# Patient Record
Sex: MALE | Race: WHITE | Employment: OTHER | URBAN - METROPOLITAN AREA
[De-identification: names, ages, dates, MRNs, and addresses within clinical notes are randomized per-mention and may not be internally consistent; named-entity substitution may affect disease eponyms.]

---

## 2017-01-18 RX ORDER — LATANOPROST 50 UG/ML
SOLUTION/ DROPS OPHTHALMIC
Qty: 2.5 ML | Refills: 8 | Status: SHIPPED | OUTPATIENT
Start: 2017-01-18 | End: 2018-05-17 | Stop reason: ALTCHOICE

## 2017-02-17 ENCOUNTER — APPOINTMENT (OUTPATIENT)
Dept: GENERAL RADIOLOGY | Age: 52
End: 2017-02-17
Attending: EMERGENCY MEDICINE
Payer: MEDICAID

## 2017-02-17 ENCOUNTER — TELEPHONE (OUTPATIENT)
Dept: INTERNAL MEDICINE CLINIC | Facility: CLINIC | Age: 52
End: 2017-02-17

## 2017-02-17 ENCOUNTER — HOSPITAL ENCOUNTER (OUTPATIENT)
Age: 52
Discharge: HOME | End: 2017-02-17
Payer: MEDICAID

## 2017-02-17 VITALS
RESPIRATION RATE: 18 BRPM | OXYGEN SATURATION: 96 % | DIASTOLIC BLOOD PRESSURE: 77 MMHG | HEART RATE: 107 BPM | TEMPERATURE: 99 F | SYSTOLIC BLOOD PRESSURE: 147 MMHG

## 2017-02-17 DIAGNOSIS — R68.89 INFLUENZA-LIKE SYMPTOMS: Primary | ICD-10-CM

## 2017-02-17 PROCEDURE — 71020 XR CHEST PA + LAT CHEST (CPT=71020): CPT

## 2017-02-17 PROCEDURE — 99213 OFFICE O/P EST LOW 20 MIN: CPT

## 2017-02-17 PROCEDURE — 99204 OFFICE O/P NEW MOD 45 MIN: CPT

## 2017-02-17 RX ORDER — OSELTAMIVIR PHOSPHATE 75 MG/1
75 CAPSULE ORAL 2 TIMES DAILY
Qty: 10 CAPSULE | Refills: 0 | Status: SHIPPED | OUTPATIENT
Start: 2017-02-17 | End: 2017-02-22

## 2017-02-17 NOTE — TELEPHONE ENCOUNTER
Pt calling having a really bad cough, fever, head ache, dizziness, and dirrhea. .. please advise for all doctors are booked. .. please advise

## 2017-02-17 NOTE — ED PROVIDER NOTES
Patient Seen in: [facility] Immediate Care In [address] [place] [address]    History   Patient presents with:  Cough/URI    Stated Complaint: cold symptom, fever    HPI    Patient is a 58-year-old male with no past medical history he complains of day #2 of fever cou in HPI. Constitutional and vital signs reviewed. All other systems reviewed and negative except as noted above. PSFH elements reviewed from today and agreed except as otherwise stated in HPI.     Physical Exam       ED Triage Vitals   BP 02/17/17 1 air      Disposition and Plan     Clinical Impression:  Influenza-like symptoms  (primary encounter diagnosis)    Disposition:  Discharge    Follow-up:  Christina Boast, MD  [address] [facility] [address]  [facility] [address] [phone]            Medications

## 2017-02-17 NOTE — ED INITIAL ASSESSMENT (HPI)
General aches and pains with cough and fever could not get out of bed not eating headaches. Took mucinex and otc meds w/o relief.

## 2017-02-17 NOTE — TELEPHONE ENCOUNTER
Reason for Call/Chief Complaint: cough, fever  Onset: 4 days  Nursing Assessment/Associated Symptoms:  dry cough. Fever and body aches yesterday and today. Patient has not taken his temperature. Sore throat, painful to swallow. Chest hurts when coughing.  Leonard Moreau

## 2017-02-18 NOTE — TELEPHONE ENCOUNTER
Unable to get a hold of patient -  states unable to leave message at this time.     Patient was seen [facility] 2/17/17 - was given a chest x-ray: normal examination, Tamiflu, and was told to follow up with [facility]    No response letter also sent to patient

## 2017-04-25 ENCOUNTER — OFFICE VISIT (OUTPATIENT)
Dept: INTERNAL MEDICINE CLINIC | Facility: CLINIC | Age: 52
End: 2017-04-25

## 2017-04-25 ENCOUNTER — LAB ENCOUNTER (OUTPATIENT)
Dept: LAB | Age: 52
End: 2017-04-25
Attending: INTERNAL MEDICINE
Payer: MEDICAID

## 2017-04-25 VITALS
HEART RATE: 71 BPM | HEIGHT: 70 IN | BODY MASS INDEX: 26.77 KG/M2 | TEMPERATURE: 98 F | SYSTOLIC BLOOD PRESSURE: 134 MMHG | DIASTOLIC BLOOD PRESSURE: 90 MMHG | WEIGHT: 187 LBS

## 2017-04-25 DIAGNOSIS — R10.9 ABDOMINAL PAIN, UNSPECIFIED LOCATION: ICD-10-CM

## 2017-04-25 DIAGNOSIS — M94.0 COSTOCHONDRITIS: ICD-10-CM

## 2017-04-25 DIAGNOSIS — M18.12 OSTEOARTHRITIS OF CARPOMETACARPAL (CMC) JOINT OF LEFT THUMB, UNSPECIFIED OSTEOARTHRITIS TYPE: Primary | ICD-10-CM

## 2017-04-25 PROCEDURE — 85025 COMPLETE CBC W/AUTO DIFF WBC: CPT

## 2017-04-25 PROCEDURE — 83690 ASSAY OF LIPASE: CPT

## 2017-04-25 PROCEDURE — 80053 COMPREHEN METABOLIC PANEL: CPT

## 2017-04-25 PROCEDURE — 36415 COLL VENOUS BLD VENIPUNCTURE: CPT

## 2017-04-25 PROCEDURE — 99212 OFFICE O/P EST SF 10 MIN: CPT | Performed by: INTERNAL MEDICINE

## 2017-04-25 PROCEDURE — 99213 OFFICE O/P EST LOW 20 MIN: CPT | Performed by: INTERNAL MEDICINE

## 2017-04-25 RX ORDER — NAPROXEN 500 MG/1
500 TABLET ORAL 2 TIMES DAILY WITH MEALS
Qty: 60 TABLET | Refills: 3 | Status: SHIPPED | OUTPATIENT
Start: 2017-04-25 | End: 2018-04-20

## 2017-04-25 NOTE — PROGRESS NOTES
Has 3 issues  1) trigger finger l thumb  2) a vauge pain left sided when he breathes very deeply  3) occ loose bms  Has never had a colonoscopy    Blood pressure 134/90, pulse 71, temperature 98.1 °F (36.7 °C), temperature source Oral, height 5' 10\" (1.77

## 2017-05-17 ENCOUNTER — TELEPHONE (OUTPATIENT)
Dept: INTERNAL MEDICINE CLINIC | Facility: CLINIC | Age: 52
End: 2017-05-17

## 2017-05-17 NOTE — TELEPHONE ENCOUNTER
Pt would like a refill on his lovastatin medication. Per pt he is out of medication. Pharmacy: [facility] (listed)     Current Outpatient Prescriptions:  Lovastatin 20 MG Oral Tab Take 1 tablet (20 mg total) by mouth nightly.  Disp: 90 tablet Rfl: 3

## 2017-05-17 NOTE — TELEPHONE ENCOUNTER
Cholesterol Medications; PROTOCOL FAILED. PLEASE ADVISE ON REFILL. THANKS.   Protocol Criteria:  · Appointment scheduled in the past 12 months or in the next 3 months  · ALT & LDL on file in the past 12 months  · ALT result < 80  · LDL result <130   Recent

## 2017-05-18 RX ORDER — LOVASTATIN 20 MG/1
TABLET ORAL
Qty: 90 TABLET | Refills: 3 | Status: SHIPPED | OUTPATIENT
Start: 2017-05-18 | End: 2018-05-17 | Stop reason: ALTCHOICE

## 2017-07-31 ENCOUNTER — HOSPITAL ENCOUNTER (OUTPATIENT)
Age: 52
Discharge: HOME | End: 2017-07-31
Attending: PEDIATRICS
Payer: MEDICAID

## 2017-07-31 VITALS
BODY MASS INDEX: 25.77 KG/M2 | HEART RATE: 180 BPM | WEIGHT: 180 LBS | OXYGEN SATURATION: 97 % | RESPIRATION RATE: 16 BRPM | DIASTOLIC BLOOD PRESSURE: 90 MMHG | HEIGHT: 70 IN | TEMPERATURE: 99 F | SYSTOLIC BLOOD PRESSURE: 134 MMHG

## 2017-07-31 DIAGNOSIS — S05.01XA CORNEAL ABRASION, RIGHT, INITIAL ENCOUNTER: ICD-10-CM

## 2017-07-31 DIAGNOSIS — H11.31 SUBCONJUNCTIVAL HEMORRHAGE OF RIGHT EYE: Primary | ICD-10-CM

## 2017-07-31 PROCEDURE — 99213 OFFICE O/P EST LOW 20 MIN: CPT

## 2017-07-31 PROCEDURE — 99214 OFFICE O/P EST MOD 30 MIN: CPT

## 2017-07-31 RX ORDER — PURIFIED WATER 986 MG/ML
2 SOLUTION OPHTHALMIC ONCE
Status: COMPLETED | OUTPATIENT
Start: 2017-07-31 | End: 2017-07-31

## 2017-07-31 RX ORDER — TETRACAINE HYDROCHLORIDE 5 MG/ML
1 SOLUTION OPHTHALMIC ONCE
Status: COMPLETED | OUTPATIENT
Start: 2017-07-31 | End: 2017-07-31
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
## 2018-05-04 ENCOUNTER — HOSPITAL ENCOUNTER (EMERGENCY)
Facility: HOSPITAL | Age: 53
Discharge: HOME | End: 2018-05-04
Payer: MEDICAID

## 2018-05-04 ENCOUNTER — APPOINTMENT (OUTPATIENT)
Dept: CT IMAGING | Facility: HOSPITAL | Age: 53
End: 2018-05-04
Attending: NURSE PRACTITIONER
Payer: MEDICAID

## 2018-05-04 ENCOUNTER — NURSE TRIAGE (OUTPATIENT)
Dept: OTHER | Age: 53
End: 2018-05-04

## 2018-05-04 VITALS
HEIGHT: 70 IN | TEMPERATURE: 98 F | OXYGEN SATURATION: 97 % | SYSTOLIC BLOOD PRESSURE: 154 MMHG | DIASTOLIC BLOOD PRESSURE: 104 MMHG | WEIGHT: 190 LBS | HEART RATE: 88 BPM | BODY MASS INDEX: 27.2 KG/M2 | RESPIRATION RATE: 16 BRPM

## 2018-05-04 DIAGNOSIS — R10.32 ABDOMINAL PAIN, LLQ: Primary | ICD-10-CM

## 2018-05-04 DIAGNOSIS — M54.16 LUMBAR RADICULOPATHY: ICD-10-CM

## 2018-05-04 PROCEDURE — 85025 COMPLETE CBC W/AUTO DIFF WBC: CPT | Performed by: NURSE PRACTITIONER

## 2018-05-04 PROCEDURE — 99284 EMERGENCY DEPT VISIT MOD MDM: CPT

## 2018-05-04 PROCEDURE — 80048 BASIC METABOLIC PNL TOTAL CA: CPT | Performed by: NURSE PRACTITIONER

## 2018-05-04 PROCEDURE — 74177 CT ABD & PELVIS W/CONTRAST: CPT | Performed by: NURSE PRACTITIONER

## 2018-05-04 PROCEDURE — 81003 URINALYSIS AUTO W/O SCOPE: CPT

## 2018-05-04 PROCEDURE — 80076 HEPATIC FUNCTION PANEL: CPT | Performed by: NURSE PRACTITIONER

## 2018-05-04 PROCEDURE — 36415 COLL VENOUS BLD VENIPUNCTURE: CPT

## 2018-05-04 RX ORDER — METHYLPREDNISOLONE 4 MG/1
TABLET ORAL
Qty: 1 PACKAGE | Refills: 0 | Status: SHIPPED | OUTPATIENT
Start: 2018-05-04 | End: 2018-05-09

## 2018-05-04 NOTE — ED PROVIDER NOTES
Patient Seen in: [facility] Emergency Department    History   Patient presents with:  Urinary Symptoms (urologic)    Stated Complaint: pelvic pain with difficulty urinating    HPI    Patient presents into the emergency room for evaluation of abdom Smoking status: Never Smoker                                                              Smokeless tobacco: Never Used                      Alcohol use:  No                Review of Systems   Gastrointestinal: Positive for abdominal pain, constipat Skin is warm and dry. He is not diaphoretic. Nursing note and vitals reviewed.           ED Course     Labs Reviewed   BASIC METABOLIC PANEL (8) - Abnormal; Notable for the following:        Result Value    Glucose 113 (*)     All other components within mg/dL   Microscopic Microscopic not indicated    -BASIC METABOLIC PANEL (8)   Collection Time: 05/04/18  4:12 PM   Result Value Ref Range   Glucose 113 (H) 70 - 99 mg/dL   Sodium 136 136 - 144 mmol/L   Potassium 3.9 3.3 - 5.1 mmol/L   Chloride 102 95 - 110 61 %   Lymphocyte % 24 %   Monocyte % 9 %   Eosinophil % 6 %   Basophil % 1 %   Neutrophil Absolute 5.3 1.8 - 7.7 K/UL   Lymphocyte Absolute 2.0 1.0 - 4.0 K/UL   Monocyte Absolute 0.8 0.0 - 1.0 K/UL   Eosinophil Absolute 0.5 0.0 - 0.7 K/UL   Basophil Absol

## 2018-05-04 NOTE — TELEPHONE ENCOUNTER
Action Requested: Summary for Provider     []  Critical Lab, Recommendations Needed  [] Need Additional Advice  [x]   FYI    []   Need Orders  [] Need Medications Sent to Pharmacy  []  Other     SUMMARY: Pt to go to ER, last seen by Dr. Marta Glynn but Dr. Quijano Mediate

## 2018-05-04 NOTE — ED INITIAL ASSESSMENT (HPI)
Patient presents to ER with c/o difficulty urinating over the last 4-5 days; reports pelvic pain. Denies fever.

## 2018-05-05 NOTE — TELEPHONE ENCOUNTER
Pt states that he was given rx for steroids, and that they felt the pain was referred from his back. They also told him he has a renal cyst that would require follow up, pt states he is feeling better and scheduled follow up for 5/17 with Dr. Jeanette Lujan.   Sent

## 2018-05-17 ENCOUNTER — OFFICE VISIT (OUTPATIENT)
Dept: INTERNAL MEDICINE CLINIC | Facility: CLINIC | Age: 53
End: 2018-05-17

## 2018-05-17 VITALS
WEIGHT: 196 LBS | BODY MASS INDEX: 28.06 KG/M2 | DIASTOLIC BLOOD PRESSURE: 88 MMHG | HEIGHT: 70 IN | HEART RATE: 79 BPM | SYSTOLIC BLOOD PRESSURE: 127 MMHG

## 2018-05-17 DIAGNOSIS — H21.233 PIGMENT DISPERSION SYNDROME OF BOTH EYES: ICD-10-CM

## 2018-05-17 DIAGNOSIS — H40.003 GLAUCOMA SUSPECT OF BOTH EYES: ICD-10-CM

## 2018-05-17 DIAGNOSIS — H52.03 HYPEROPIA WITH PRESBYOPIA OF BOTH EYES: ICD-10-CM

## 2018-05-17 DIAGNOSIS — Z00.00 ANNUAL PHYSICAL EXAM: Primary | ICD-10-CM

## 2018-05-17 DIAGNOSIS — H40.1321 PIGMENTARY GLAUCOMA OF LEFT EYE, MILD STAGE: ICD-10-CM

## 2018-05-17 DIAGNOSIS — E78.00 HYPERCHOLESTEROLEMIA: ICD-10-CM

## 2018-05-17 DIAGNOSIS — H25.13 AGE-RELATED NUCLEAR CATARACT OF BOTH EYES: ICD-10-CM

## 2018-05-17 DIAGNOSIS — Z12.11 COLON CANCER SCREENING: ICD-10-CM

## 2018-05-17 DIAGNOSIS — R10.32 LLQ PAIN: ICD-10-CM

## 2018-05-17 DIAGNOSIS — H52.4 HYPEROPIA WITH PRESBYOPIA OF BOTH EYES: ICD-10-CM

## 2018-05-17 DIAGNOSIS — R73.01 IMPAIRED FASTING GLUCOSE: ICD-10-CM

## 2018-05-17 PROBLEM — M94.0 COSTOCHONDRITIS: Status: RESOLVED | Noted: 2017-04-25 | Resolved: 2018-05-17

## 2018-05-17 PROBLEM — M18.12 OSTEOARTHRITIS OF CARPOMETACARPAL (CMC) JOINT OF LEFT THUMB: Status: RESOLVED | Noted: 2017-04-25 | Resolved: 2018-05-17

## 2018-05-17 PROCEDURE — 99212 OFFICE O/P EST SF 10 MIN: CPT | Performed by: INTERNAL MEDICINE

## 2018-05-17 PROCEDURE — 99396 PREV VISIT EST AGE 40-64: CPT | Performed by: INTERNAL MEDICINE

## 2018-05-17 PROCEDURE — 99213 OFFICE O/P EST LOW 20 MIN: CPT | Performed by: INTERNAL MEDICINE

## 2018-05-17 NOTE — PROGRESS NOTES
Patient ID: Rick Tobar is a 46year old male. Patient presents with:  Physical  Abdominal Pain       HISTORY OF PRESENT ILLNESS:   HPI  Patient presents for above. Here for a complete physical exam as well as ER follow-up.     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  · Diet and exercise discussed. 4. LLQ pain  · GASTRO - INTERNAL  · Has diverticulosis on CT. 5. Glaucoma suspect of both eyes  · OPHTHALMOLOGY - INTERNAL    6. Age-related nuclear cataract of both eyes  · OPHTHALMOLOGY - INTERNAL    7.  Pigmen

## 2018-05-17 NOTE — PATIENT INSTRUCTIONS
Prevention Guidelines, Men Ages 48 to 59  Screening tests and vaccines are an important part of managing your health. Health counseling is essential, too. Below are guidelines for these, for men ages 48 to 59.  Talk with your healthcare provider to make s Prostate cancer Starting at age 39, talk to healthcare provider about risks and benefits of digital rectal exam (GARRICK) and prostate-specific antigen (PSA) screening1 At routine exams   Syphilis Men at increased risk for infection – talk with your healthcare pertussis (Td/Tdap) booster All men in this age group Td every 10 years, or a one-time dose of Tdap instead of a Td booster after age 25, then Td every 8 years   Zoster All men ages 61 and older 1 dose   Counseling Who needs it How often   Diet and exerci

## 2018-05-18 ENCOUNTER — APPOINTMENT (OUTPATIENT)
Dept: LAB | Age: 53
End: 2018-05-18
Attending: INTERNAL MEDICINE
Payer: MEDICAID

## 2018-05-18 DIAGNOSIS — R73.01 IMPAIRED FASTING GLUCOSE: ICD-10-CM

## 2018-05-18 DIAGNOSIS — Z00.00 ANNUAL PHYSICAL EXAM: ICD-10-CM

## 2018-05-18 DIAGNOSIS — E78.00 HYPERCHOLESTEROLEMIA: Primary | ICD-10-CM

## 2018-05-18 PROCEDURE — 84153 ASSAY OF PSA TOTAL: CPT

## 2018-05-18 PROCEDURE — 80061 LIPID PANEL: CPT

## 2018-05-18 PROCEDURE — 83036 HEMOGLOBIN GLYCOSYLATED A1C: CPT

## 2018-05-18 PROCEDURE — 84443 ASSAY THYROID STIM HORMONE: CPT

## 2018-05-18 PROCEDURE — 36415 COLL VENOUS BLD VENIPUNCTURE: CPT

## 2018-05-18 RX ORDER — ATORVASTATIN CALCIUM 20 MG/1
20 TABLET, FILM COATED ORAL NIGHTLY
Qty: 90 TABLET | Refills: 0 | Status: SHIPPED | OUTPATIENT
Start: 2018-05-18 | End: 2018-08-16

## 2018-05-25 ENCOUNTER — TELEPHONE (OUTPATIENT)
Dept: OTHER | Age: 53
End: 2018-05-25

## 2018-05-25 NOTE — TELEPHONE ENCOUNTER
Unable to leave a message.   Both lines received a busy signal.  Please see 5/18/18 lab results  (Dr. Sudhir Eddy sent me this personally)      MD Yuval Vincent RN Cc: P Em Rn Triage             Please let him know that we will try a different sta

## 2018-05-26 NOTE — TELEPHONE ENCOUNTER
Per pt taking medication and spoke with a nurse in regards to test results. No further questions or concerns.

## 2018-08-16 DIAGNOSIS — E78.00 HYPERCHOLESTEROLEMIA: ICD-10-CM

## 2018-08-17 RX ORDER — ATORVASTATIN CALCIUM 20 MG/1
TABLET, FILM COATED ORAL
Qty: 90 TABLET | Refills: 0 | Status: SHIPPED | OUTPATIENT
Start: 2018-08-17 | End: 2019-03-28

## 2018-08-17 NOTE — TELEPHONE ENCOUNTER
Please call patient and schedule appointment to check cholesterol.  Thanks    Bob Acosta MD   Em [facility] 37 minutes ago (2:05 PM)      3 months refill. Jose Martinez was to have made an appointment for August 2018 for checkup on his cholesterol.  Please

## 2019-03-17 ENCOUNTER — HOSPITAL ENCOUNTER (EMERGENCY)
Facility: HOSPITAL | Age: 54
Discharge: HOME | End: 2019-03-17
Attending: EMERGENCY MEDICINE
Payer: MEDICAID

## 2019-03-17 VITALS
SYSTOLIC BLOOD PRESSURE: 150 MMHG | OXYGEN SATURATION: 96 % | HEART RATE: 89 BPM | WEIGHT: 185 LBS | TEMPERATURE: 98 F | HEIGHT: 69 IN | RESPIRATION RATE: 20 BRPM | DIASTOLIC BLOOD PRESSURE: 90 MMHG | BODY MASS INDEX: 27.4 KG/M2

## 2019-03-17 DIAGNOSIS — T78.40XA ALLERGIC REACTION, INITIAL ENCOUNTER: Primary | ICD-10-CM

## 2019-03-17 PROCEDURE — 99283 EMERGENCY DEPT VISIT LOW MDM: CPT

## 2019-03-17 RX ORDER — EPINEPHRINE 0.3 MG/.3ML
0.3 INJECTION SUBCUTANEOUS
Qty: 1 EACH | Refills: 0 | Status: SHIPPED | OUTPATIENT
Start: 2019-03-17 | End: 2019-04-16

## 2019-03-17 RX ORDER — PREDNISONE 20 MG/1
40 TABLET ORAL DAILY
Qty: 8 TABLET | Refills: 0 | Status: SHIPPED | OUTPATIENT
Start: 2019-03-17 | End: 2019-03-21

## 2019-03-17 RX ORDER — PREDNISONE 20 MG/1
40 TABLET ORAL ONCE
Status: COMPLETED | OUTPATIENT
Start: 2019-03-17 | End: 2019-03-17

## 2019-03-17 RX ORDER — CETIRIZINE HYDROCHLORIDE 10 MG/1
10 TABLET ORAL EVERY 12 HOURS PRN
Qty: 14 TABLET | Refills: 0 | Status: SHIPPED | OUTPATIENT
Start: 2019-03-17 | End: 2019-03-24

## 2019-03-17 NOTE — ED NOTES
46year old male here with allergic reaction to calamari, pt reports has had before never had reaction, pt reports generalized redness, itchiness to face and hands , denies Lip, tongue or throat swelling, took benadryl x2 at home and received predisone at

## 2019-03-17 NOTE — ED INITIAL ASSESSMENT (HPI)
Patient complains of pruritis, lip swelling and bilateral hand swelling s/p ingestion of calamari, airway patent, speaking in full sentences, denies nehal, states he took two benadryl pta

## 2019-03-18 NOTE — ED PROVIDER NOTES
Patient Seen in: [facility] Emergency Department    History   No chief complaint on file. HPI    Patient presents to the ED with allergic reaction symptoms.   He states that he ate calamari this afternoon and afterwards developed itching to hi reviewed and are negative. Constitutional and vital signs reviewed. Social History and Family History elements reviewed from today, pertinent positives to the presenting problem noted.     Physical Exam     ED Triage Vitals [03/17/19 1753]   BP (!) Considerations: Allergic reaction to calamari    Medical Record Review: I personally reviewed available prior medical records for any recent pertinent discharge summaries, testing, and procedures and reviewed those reports. Complicating Factors:  The pat Script, Disp-1 each, R-0

## 2019-03-28 ENCOUNTER — OFFICE VISIT (OUTPATIENT)
Dept: INTERNAL MEDICINE CLINIC | Facility: CLINIC | Age: 54
End: 2019-03-28
Payer: MEDICAID

## 2019-03-28 VITALS
DIASTOLIC BLOOD PRESSURE: 83 MMHG | HEIGHT: 70 IN | SYSTOLIC BLOOD PRESSURE: 129 MMHG | HEART RATE: 87 BPM | TEMPERATURE: 99 F | BODY MASS INDEX: 28.77 KG/M2 | WEIGHT: 201 LBS

## 2019-03-28 DIAGNOSIS — T78.40XS ALLERGIC REACTION, SEQUELA: ICD-10-CM

## 2019-03-28 DIAGNOSIS — M79.671 RIGHT FOOT PAIN: Primary | ICD-10-CM

## 2019-03-28 PROBLEM — T78.40XA ALLERGIC REACTION: Status: ACTIVE | Noted: 2019-03-28

## 2019-03-28 PROCEDURE — 99214 OFFICE O/P EST MOD 30 MIN: CPT | Performed by: INTERNAL MEDICINE

## 2019-03-28 PROCEDURE — 99212 OFFICE O/P EST SF 10 MIN: CPT | Performed by: INTERNAL MEDICINE

## 2019-03-28 NOTE — PROGRESS NOTES
Patient ID: Ina Severe is a 46year old male. Patient presents with:  ER F/U: was seen Sunday due to accident on right foot, was seen at [facility]. Per patient numbness on toe.         HISTORY OF PRESENT ILLNESS:   HPI  Patient presents for •  EPINEPHrine 0.3 MG/0.3ML Injection Solution Auto-injector, Inject 0.3 mL (1 each total) into the muscle daily as needed. , Disp: 1 each, Rfl: 0    Allergies:  Grass                   Runny nose  Ragweed                 ITCHING    Social History    Socioe Special Diet: Not Asked        Back Care: Not Asked        Exercise: Not Asked        Bike Helmet: Not Asked        Seat Belt: Not Asked        Self-Exams: Not Asked        Grew up on a farm: Not Asked        History of tanning: Not Asked        Outd

## 2019-07-08 ENCOUNTER — OFFICE VISIT (OUTPATIENT)
Dept: INTERNAL MEDICINE CLINIC | Facility: CLINIC | Age: 54
End: 2019-07-08
Payer: MEDICAID

## 2019-07-08 ENCOUNTER — NURSE TRIAGE (OUTPATIENT)
Dept: OTHER | Age: 54
End: 2019-07-08

## 2019-07-08 VITALS
BODY MASS INDEX: 29.06 KG/M2 | HEART RATE: 85 BPM | SYSTOLIC BLOOD PRESSURE: 141 MMHG | DIASTOLIC BLOOD PRESSURE: 73 MMHG | HEIGHT: 70 IN | WEIGHT: 203 LBS
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
## 2019-07-08 DIAGNOSIS — S10.86XA INSECT BITE OF OTHER PART OF NECK, INITIAL ENCOUNTER: Primary | ICD-10-CM

## 2019-07-08 PROCEDURE — 99213 OFFICE O/P EST LOW 20 MIN: CPT | Performed by: INTERNAL MEDICINE

## 2019-07-08 RX ORDER — DIAPER,BRIEF,INFANT-TODD,DISP
1 EACH MISCELLANEOUS 2 TIMES DAILY
Qty: 28 G | Refills: 0 | Status: SHIPPED | OUTPATIENT
Start: 2019-07-08 | End: 2019-10-29 | Stop reason: ALTCHOICE

## 2019-07-08 NOTE — PATIENT INSTRUCTIONS
Insect, Spider, and Scorpion Bites and Stings     The black  (top) and brown recluse (bottom) are two poisonous spiders found in the [place]. Most insect bites are harmless and cause only minor swelling or itching.  But if you’re allergic t Easing symptoms of an insect bite or sting  · Try to remove a stinger you can see. Use your fingernail, a knife edge, or credit card to scrape against the skin. Do not squeeze or pull.   · Apply ice or a cold compress to reduce pain and swelling (keep a The Misha

## 2019-07-08 NOTE — TELEPHONE ENCOUNTER
Action Requested: Summary for Provider     []  Critical Lab, Recommendations Needed  [] Need Additional Advice  []   FYI    []   Need Orders  [] Need Medications Sent to Pharmacy  []  Other     SUMMARY: appt made today with Dr. Arin Barahona.    Reason for marc

## 2019-07-08 NOTE — PROGRESS NOTES
Carli Cr is a 53year old male.   Patient presents with:  Bite Sting,Insect (integumentary): itchy happened on 4th       HPI:   Comes as an urgent visit  C/C bug bites on neck x 4 days   C/o bug bites on the 4th of July --itching   Noticed rash   Took frontal or maxillary sinus tenderness  NECK: supple,no adenopathy,non  tender  LUNGS: clear to auscultation, no wheeze  CARDIO: RRR without murmur  EXTREMITIES: noedema    ASSESSMENT AND PLAN:   Diagnoses and all orders for this visit:    Insect bite of ot

## 2019-10-29 ENCOUNTER — OFFICE VISIT (OUTPATIENT)
Dept: INTERNAL MEDICINE CLINIC | Facility: CLINIC | Age: 54
End: 2019-10-29
Payer: MEDICAID

## 2019-10-29 VITALS
TEMPERATURE: 98 F | BODY MASS INDEX: 28.92 KG/M2 | HEART RATE: 81 BPM | SYSTOLIC BLOOD PRESSURE: 148 MMHG | WEIGHT: 202 LBS | DIASTOLIC BLOOD PRESSURE: 93 MMHG | HEIGHT: 70 IN

## 2019-10-29 DIAGNOSIS — Z28.21 IMMUNIZATION NOT CARRIED OUT BECAUSE OF PATIENT REFUSAL: ICD-10-CM

## 2019-10-29 DIAGNOSIS — Z12.11 COLON CANCER SCREENING: ICD-10-CM

## 2019-10-29 DIAGNOSIS — Z00.00 ANNUAL PHYSICAL EXAM: Primary | ICD-10-CM

## 2019-10-29 DIAGNOSIS — I10 PRIMARY HYPERTENSION: ICD-10-CM

## 2019-10-29 DIAGNOSIS — R73.01 IMPAIRED FASTING GLUCOSE: ICD-10-CM

## 2019-10-29 DIAGNOSIS — E78.00 HYPERCHOLESTEROLEMIA: ICD-10-CM

## 2019-10-29 PROCEDURE — 99396 PREV VISIT EST AGE 40-64: CPT | Performed by: INTERNAL MEDICINE

## 2019-10-29 PROCEDURE — 99213 OFFICE O/P EST LOW 20 MIN: CPT | Performed by: INTERNAL MEDICINE

## 2019-10-29 RX ORDER — LISINOPRIL 10 MG/1
10 TABLET ORAL DAILY
Qty: 90 TABLET | Refills: 0 | Status: SHIPPED | OUTPATIENT
Start: 2019-10-29 | End: 2019-11-26

## 2019-10-29 NOTE — PATIENT INSTRUCTIONS
Prevention Guidelines, Men Ages 48 to 59  Screening tests and vaccines are an important part of managing your health. A screening test is done to find possible disorders or diseases in people who don't have any symptoms.  The goal is to find a disease early High cholesterol or triglycerides All men in this age group At least every 5 years   HIV Men at increased risk for infection – talk with your healthcare provider At routine exams   Lung cancer Adults age 54 to [de-identified] who have smoked Yearly screening in smokers Pneumococcal conjugate vaccine (PCV13) and pneumococcal polysaccharide vaccine (PPSV23) Men at increased risk for infection – talk with your healthcare provider PCV13: 1 dose ages 23 to 72 (protects against 13 types of pneumococcal bacteria)     PPSV23: 1 Table salt contains the mineral sodium. Your body needs sodium to work normally. But too much sodium can make your health problems worse. Your healthcare provider is recommending a low-salt (also called low-sodium) diet for you.  Your total daily allowance Avoid: Smoked, pickled, brine-cured, or salted meats and fish. This includes song, chipped beef, corned beef, hot dogs, deli meats, ham, kosher meats, salt pork, sausage, canned tuna, salted codfish, smoked salmon, herring, sardines, or anchovies.   Season The cholesterol in your blood comes from 2 sources: cholesterol in food that you eat and cholesterol that your liver makes. You should limit the amount of cholesterol in your diet. But the cholesterol that your body makes has the greatest disease risk.  And · Avoid saturated fats found in animal products such as meat, dairy (whole-milk, cheese and ice cream), poultry skin, and egg yolks. Plants high in saturated oils include coconut oil, palm oil, and palm kernel oil.   · If you eat meat, choose smaller portio Take this medicine by mouth with a glass of water. Follow the directions on your prescription label. You may take this medicine with or without food. If it upsets your stomach, take it with food. Take your medicine at regular intervals.  Do not take it more · angiotensin receptor blockers, like losartan or valsartan  · certain medicines for diabetes  · diuretics  · everolimus  · gold compounds  · lithium  · NSAIDs, medicines for pain and inflammation, like ibuprofen or naproxen  · potassium salts or supplemen Women should inform their doctor if they wish to become pregnant or think they might be pregnant. There is a potential for serious side effects to an unborn child. Talk to your health care professional or pharmacist for more information.   Check with your d

## 2019-10-29 NOTE — PROGRESS NOTES
Patient ID: Cass Cain is a 53year old male. Patient presents with:  Physical  Blood Pressure: elevated BP        HISTORY OF PRESENT ILLNESS:   HPI  Patient presents for above. Here for annual physical and to discuss multiple issues.     Having elev Disp: 90 tablet, Rfl: 0    Allergies:  Calamari Oil [Squid*    ITCHING, SWELLING  Grass                   Runny nose  Ragweed                 ITCHING    Social History    Socioeconomic History      Marital status:       Spouse name: Not on file Bike Helmet: Not Asked        Seat Belt: Not Asked        Self-Exams: Not Asked        Grew up on a farm: Not Asked        History of tanning: Not Asked        Outdoor occupation: Not Asked        Pt has a pacemaker: Not Asked        Pt has a defibrillator glucose  · HEMOGLOBIN A1C; Future  · Has had normal A1cs in the past.    5. Colon cancer screening  · GASTRO - INTERNAL    6.  Immunization not carried out because of patient refusal  · INFLUENZA REFUSED DMG    Return in about 4 weeks (around 11/26/2019) fo

## 2019-11-06 ENCOUNTER — TELEPHONE (OUTPATIENT)
Dept: INTERNAL MEDICINE CLINIC | Facility: CLINIC | Age: 54
End: 2019-11-06

## 2019-11-06 ENCOUNTER — NURSE ONLY (OUTPATIENT)
Dept: GASTROENTEROLOGY | Facility: CLINIC | Age: 54
End: 2019-11-06

## 2019-11-06 NOTE — TELEPHONE ENCOUNTER
Please have him continue for another 1-2 weeks and see how he feels. This symptom typically goes away after a few weeks.

## 2019-11-06 NOTE — TELEPHONE ENCOUNTER
Dr. Ruchi Wall, Patient c/o  feeling very tired while on lisinopril. No energy. Has been taking medication for One week. Denied palpitations, no other symptoms. The following are BP readings while on lisinopril.      126/83, 124/84  118/80  138/95, most

## 2019-11-06 NOTE — TELEPHONE ENCOUNTER
Per patient he is having symptoms to the following medication :    -lethargic etc.     Current Outpatient Medications   Medication Sig Dispense Refill   • lisinopril 10 MG Oral Tab Take 1 tablet (10 mg total) by mouth daily.  90 tablet 0

## 2019-11-06 NOTE — TELEPHONE ENCOUNTER
The patient called us and he was informed of below. The patient stated he took the Lisinopril today and if continues to feel tired will call us back by Friday. He is also concerned about not completing the GI form before his appointment today.   The marc

## 2019-11-06 NOTE — PROGRESS NOTES
Last Procedure:  Patient never had a colonoscopy done before. Symptoms (Y/N): Patient has occasional low abdominal pain,acid reflux and hemorrhoids for several months . Patient takes Prilosec with relief. No pain currently or rectal bleeding.     Vadim

## 2019-11-06 NOTE — TELEPHONE ENCOUNTER
Dr. Jeferson Rowe, Please note,  Patient was given your advice. He again said he feels very tired; unable to function. He mentioned it to the pharmacist, and felt he shouldn't be taking medication.      Patient will continue lisinopril for a few days, but he state

## 2019-11-07 ENCOUNTER — LAB ENCOUNTER (OUTPATIENT)
Dept: LAB | Age: 54
End: 2019-11-07
Attending: INTERNAL MEDICINE
Payer: MEDICAID

## 2019-11-07 DIAGNOSIS — E78.00 HYPERCHOLESTEROLEMIA: ICD-10-CM

## 2019-11-07 DIAGNOSIS — Z00.00 ANNUAL PHYSICAL EXAM: ICD-10-CM

## 2019-11-07 DIAGNOSIS — R73.01 IMPAIRED FASTING GLUCOSE: ICD-10-CM

## 2019-11-07 PROCEDURE — 36415 COLL VENOUS BLD VENIPUNCTURE: CPT

## 2019-11-07 PROCEDURE — 80053 COMPREHEN METABOLIC PANEL: CPT

## 2019-11-07 PROCEDURE — 84153 ASSAY OF PSA TOTAL: CPT

## 2019-11-07 PROCEDURE — 85025 COMPLETE CBC W/AUTO DIFF WBC: CPT

## 2019-11-07 PROCEDURE — 80061 LIPID PANEL: CPT

## 2019-11-07 PROCEDURE — 84443 ASSAY THYROID STIM HORMONE: CPT

## 2019-11-07 PROCEDURE — 83036 HEMOGLOBIN GLYCOSYLATED A1C: CPT

## 2019-11-26 ENCOUNTER — OFFICE VISIT (OUTPATIENT)
Dept: INTERNAL MEDICINE CLINIC | Facility: CLINIC | Age: 54
End: 2019-11-26
Payer: MEDICAID

## 2019-11-26 ENCOUNTER — TELEPHONE (OUTPATIENT)
Dept: DERMATOLOGY CLINIC | Facility: CLINIC | Age: 54
End: 2019-11-26

## 2019-11-26 VITALS
HEART RATE: 98 BPM | WEIGHT: 203 LBS | HEIGHT: 70 IN | SYSTOLIC BLOOD PRESSURE: 134 MMHG | DIASTOLIC BLOOD PRESSURE: 89 MMHG | TEMPERATURE: 98 F | BODY MASS INDEX: 29.06 KG/M2

## 2019-11-26 DIAGNOSIS — I10 PRIMARY HYPERTENSION: Primary | ICD-10-CM

## 2019-11-26 DIAGNOSIS — D22.9 NEVUS: ICD-10-CM

## 2019-11-26 DIAGNOSIS — H53.8 BLURRY VISION, BILATERAL: ICD-10-CM

## 2019-11-26 PROCEDURE — 99214 OFFICE O/P EST MOD 30 MIN: CPT | Performed by: INTERNAL MEDICINE

## 2019-11-26 NOTE — TELEPHONE ENCOUNTER
Pt was referred to see  but he is a new patient per new protocol offer appointment in March pt was outrage he needed to wait that long and wanted to get a call back pt has a spot on his nipple.

## 2019-11-26 NOTE — PATIENT INSTRUCTIONS
Low-Salt Diet  This diet removes foods that are high in salt. It also limits the amount of salt you use when cooking. It is most often used for people with high blood pressure, edema (fluid retention), and kidney, liver, or heart disease.   Table salt conta Avoid: Flavored coffees, electrolyte replacement drinks, sports drinks  Meats  Ok: All fresh meat, fish, poultry, low-salt tuna, eggs, egg substitute  Avoid: Smoked, pickled, brine-cured, or salted meats and fish.  This includes song, chipped beef, corned

## 2019-11-26 NOTE — PROGRESS NOTES
Patient ID: Jake Vasquez is a 53year old male. Patient presents with: Follow - Up: Not taking Lisinopril, due to side effects.    Vision Problem: Eye vision getting worse  Skin: Per patient describes \"pimple\" like on left area of the chest       HIS Ragweed                 ITCHING    Social History    Socioeconomic History      Marital status:       Spouse name: Not on file      Number of children: Not on file      Years of education: Not on file      Highest education level: Not on file    Occ History of tanning: Not Asked        Outdoor occupation: Not Asked        Pt has a pacemaker: Not Asked        Pt has a defibrillator: Not Asked        Reaction to local anesthetic: No    Social History Narrative      Not on file          PHYSICAL EX

## 2019-11-27 NOTE — TELEPHONE ENCOUNTER
Pt never seen by Ban Lemus - last seen 4 and 1/2 years ago by SD, Dr. Marah Rosa note reviewed - note does not mention  a referral to derm, s/w pt, per derm protocol, I advised pt to please reach out to his PCP and if he needs to be seen sooner, we will need a

## 2020-01-30 ENCOUNTER — OFFICE VISIT (OUTPATIENT)
Dept: OPHTHALMOLOGY | Facility: CLINIC | Age: 55
End: 2020-01-30
Payer: MEDICAID

## 2020-01-30 DIAGNOSIS — H43.393 VITREOUS FLOATERS OF BOTH EYES: ICD-10-CM

## 2020-01-30 DIAGNOSIS — H25.13 AGE-RELATED NUCLEAR CATARACT OF BOTH EYES: ICD-10-CM

## 2020-01-30 DIAGNOSIS — H40.1332 PIGMENTARY GLAUCOMA OF BOTH EYES, MODERATE STAGE: Primary | ICD-10-CM

## 2020-01-30 PROCEDURE — 92015 DETERMINE REFRACTIVE STATE: CPT | Performed by: OPHTHALMOLOGY

## 2020-01-30 PROCEDURE — 99243 OFF/OP CNSLTJ NEW/EST LOW 30: CPT | Performed by: OPHTHALMOLOGY

## 2020-01-30 PROCEDURE — 92250 FUNDUS PHOTOGRAPHY W/I&R: CPT | Performed by: OPHTHALMOLOGY

## 2020-01-30 RX ORDER — LATANOPROST 50 UG/ML
SOLUTION/ DROPS OPHTHALMIC
Qty: 3 BOTTLE | Refills: 3 | Status: SHIPPED | OUTPATIENT
Start: 2020-01-30 | End: 2020-02-25

## 2020-01-30 NOTE — PROGRESS NOTES
Janey Millan is a 53year old male.     HPI:     HPI     Consult      Additional comments: Consult per Dr. Gaston Erazo              Comments     Patient is here for a complete exam.  He complains of foggy vision in both eyes at distance and near for about 2 mon ITCHING    ROS:     ROS     Positive for: Eyes    Negative for: Constitutional, Gastrointestinal, Neurological, Skin, Genitourinary, Musculoskeletal, HENT, Endocrine, Cardiovascular, Respiratory, Psychiatric, Allergic/Imm, Heme/Lymph    Last edited by [facility] [employer]here 20/20 +2.50 20/20    Type:  Progressive bifocal                 ASSESSMENT/PLAN:     Diagnoses and Plan:     Pigmentary glaucoma of both eyes, moderate stage  Patient has been lost to follow up since 2016.    Stressed to patient that glaucoma is a pr

## 2020-01-30 NOTE — PATIENT INSTRUCTIONS
Pigmentary glaucoma of both eyes, moderate stage  Patient has been lost to follow up since 2016. Stressed to patient that glaucoma is a progressive eye disease that can lead to vision loss and eventually blindness if it is not treated.     [employer] Too much pressure in the eye can damage the optic nerve. If damaged, this nerve cannot send the messages to the brain that let you see. Open-angle glaucoma  Open-angle is the most common kind of glaucoma. It happens slowly as people age.  The drainage area In some cases of glaucoma, other procedures are used to improve eye drainage:  · Lasers can be used to increase drainage. · If other treatments don't work, surgery may be suggested.   Date Last Reviewed: 10/1/2017  © 9315-8448 [employer] [address]

## 2020-01-30 NOTE — ASSESSMENT & PLAN NOTE
Patient has been lost to follow up since 2016. Stressed to patient that glaucoma is a progressive eye disease that can lead to vision loss and eventually blindness if it is not treated. Start Latanoprost in both eyes at bedtime.    Discussed with felton

## 2020-02-07 ENCOUNTER — OFFICE VISIT (OUTPATIENT)
Dept: DERMATOLOGY CLINIC | Facility: CLINIC | Age: 55
End: 2020-02-07
Payer: MEDICAID

## 2020-02-07 DIAGNOSIS — L91.8 INFLAMED ACROCHORDON: Primary | ICD-10-CM

## 2020-02-07 DIAGNOSIS — D23.5 BENIGN NEOPLASM OF SKIN OF TRUNK, EXCEPT SCROTUM: ICD-10-CM

## 2020-02-07 DIAGNOSIS — L28.0 LSC (LICHEN SIMPLEX CHRONICUS): ICD-10-CM

## 2020-02-07 DIAGNOSIS — D23.30 BENIGN NEOPLASM OF SKIN OF FACE: ICD-10-CM

## 2020-02-07 PROCEDURE — 99202 OFFICE O/P NEW SF 15 MIN: CPT | Performed by: DERMATOLOGY

## 2020-02-07 PROCEDURE — 11200 RMVL SKIN TAGS UP TO&INC 15: CPT | Performed by: DERMATOLOGY

## 2020-02-12 ENCOUNTER — NURSE ONLY (OUTPATIENT)
Dept: OPHTHALMOLOGY | Facility: CLINIC | Age: 55
End: 2020-02-12
Payer: MEDICAID

## 2020-02-12 DIAGNOSIS — H40.1332 PIGMENTARY GLAUCOMA OF BOTH EYES, MODERATE STAGE: ICD-10-CM

## 2020-02-12 PROCEDURE — 92083 EXTENDED VISUAL FIELD XM: CPT | Performed by: OPHTHALMOLOGY

## 2020-02-12 PROCEDURE — 92133 CPTRZD OPH DX IMG PST SGM ON: CPT | Performed by: OPHTHALMOLOGY

## 2020-02-12 NOTE — ASSESSMENT & PLAN NOTE
Normal visual field and OCT of right eye. Abnormal visual field in the left eye. Abnormal OCT in the left eye. Diagnosis of glaucoma in both eyes. Continue Latanoprost at bedtime; use 1 drop in both eyes at bedtime.    Discussed potential side effects

## 2020-02-12 NOTE — PROGRESS NOTES
Latoya Tyson is a 53year old male.     HPI:     HPI     Here for a VF and OCT with no MD.     Last edited by Marixa Romero O.T. on 2/12/2020  3:25 PM. (History)        Patient History:  Past Medical History:   Diagnosis Date   • Age-related nuclear ca hyperpigmentation of the skin around the eyes and eyelash lengthening.   Patient should always gently wipe away any excess drops that get on the outer eyelid to prevent hyperpigmentation (darkening of the skin around the eye)         No orders of the define

## 2020-02-13 ENCOUNTER — TELEPHONE (OUTPATIENT)
Dept: OPHTHALMOLOGY | Facility: CLINIC | Age: 55
End: 2020-02-13

## 2020-02-17 NOTE — PROGRESS NOTES
Ernesto Howard is a 53year old male. HPI:     CC:  Patient presents with:  Lesion: LOV 4/24/2015 with Dr. Kenneth Washington. Pt presenting with lesions to bilateral ankles. \"roughness\" decreased when in [place]. Pt denies personal and family hx of skin cancer. 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      Multiple light to medium brown, well marginated, uniformly pigmented, macules and papules 6 mm and less scattered on exam. pigmente additional diagnoses. Pros cons of various therapies, risks benefits discussed. Pathophysiology discussed with patient. Therapeutic options reviewed. See  Medications in grid. Instructions reviewed at length.     Benign nevi, seborrheic  keratoses, cherr

## 2020-02-19 ENCOUNTER — TELEPHONE (OUTPATIENT)
Dept: INTERNAL MEDICINE CLINIC | Facility: CLINIC | Age: 55
End: 2020-02-19

## 2020-02-19 DIAGNOSIS — D22.9 NEVUS: Primary | ICD-10-CM

## 2020-02-25 ENCOUNTER — OFFICE VISIT (OUTPATIENT)
Dept: OPHTHALMOLOGY | Facility: CLINIC | Age: 55
End: 2020-02-25
Payer: MEDICAID

## 2020-02-25 DIAGNOSIS — H40.1332 PIGMENTARY GLAUCOMA OF BOTH EYES, MODERATE STAGE: Primary | ICD-10-CM

## 2020-02-25 PROCEDURE — 99213 OFFICE O/P EST LOW 20 MIN: CPT | Performed by: OPHTHALMOLOGY

## 2020-02-25 RX ORDER — LATANOPROST 50 UG/ML
SOLUTION/ DROPS OPHTHALMIC
Qty: 3 BOTTLE | Refills: 3 | Status: SHIPPED | OUTPATIENT
Start: 2020-02-25 | End: 2021-01-06

## 2020-02-25 NOTE — PATIENT INSTRUCTIONS
Pigmentary glaucoma of both eyes, moderate stage  IOP is improved!!  It has gone from 27 in each eye down to 16 in each eye. Continue Latanoprost at bedtime in both eyes. Stressed importance of taking the drops every night.    Will have patient back in 4

## 2020-02-25 NOTE — PROGRESS NOTES
Cass Cain is a 53year old male. HPI:     HPI     Here for an IOP check. Pt has been using Latanoprost OU qhs as directed. Drops were started on 1/30/2020. Pt states that his vision is stable and denies any ocular complaints.      [facility] edited by [facility] (Applanation, 11:09 AM)       Right Left    Pressure 16 16          Pachymetry (09/10/2013)       Right Left    Thickness 530/+1 539/+1/2          Pupils       Pupils    Right PERRL    Left PERRL            Slit Lamp and Fundus Exam     Slit Lamp

## 2020-02-25 NOTE — ASSESSMENT & PLAN NOTE
IOP is improved!!  It has gone from 27 in each eye down to 16 in each eye. Continue Latanoprost at bedtime in both eyes. Stressed importance of taking the drops every night. Will have patient back in 4 months for a pressure check.

## 2020-03-14 ENCOUNTER — TELEPHONE (OUTPATIENT)
Dept: INTERNAL MEDICINE CLINIC | Facility: CLINIC | Age: 55
End: 2020-03-14

## 2020-03-14 NOTE — TELEPHONE ENCOUNTER
Pt called --stated he has been taking metformin but ran out 3 days ago–reviewed no and labs and noted that it was last given in 2016--patient states that he has been taking some metformin from your   and his A1c November was 5.6  Patient states that since

## 2020-03-16 ENCOUNTER — OFFICE VISIT (OUTPATIENT)
Dept: DERMATOLOGY CLINIC | Facility: CLINIC | Age: 55
End: 2020-03-16
Payer: MEDICAID

## 2020-03-16 DIAGNOSIS — L57.0 AK (ACTINIC KERATOSIS): Primary | ICD-10-CM

## 2020-03-16 DIAGNOSIS — L82.1 SEBORRHEIC KERATOSES: ICD-10-CM

## 2020-03-16 DIAGNOSIS — D23.9 BENIGN NEOPLASM OF SKIN, UNSPECIFIED LOCATION: ICD-10-CM

## 2020-03-16 DIAGNOSIS — L72.0 EPIDERMAL CYST: ICD-10-CM

## 2020-03-16 PROCEDURE — 17000 DESTRUCT PREMALG LESION: CPT | Performed by: DERMATOLOGY

## 2020-03-16 PROCEDURE — 99213 OFFICE O/P EST LOW 20 MIN: CPT | Performed by: DERMATOLOGY

## 2020-03-28 ENCOUNTER — NURSE TRIAGE (OUTPATIENT)
Dept: OTHER | Age: 55
End: 2020-03-28

## 2020-03-28 NOTE — TELEPHONE ENCOUNTER
Action Requested: Summary for Provider     []  Critical Lab, Recommendations Needed  [x] Need Additional Advice  []   FYI    []   Need Orders  [] Need Medications Sent to Pharmacy  []  Other     SUMMARY:  Patient states for past 1 week he as lower abdomi

## 2020-03-28 NOTE — TELEPHONE ENCOUNTER
Having lower abdominal pain in the middle  When pressing does not hurt  Cramp  No nausea vomiting diarrhea  Moving bowels and urinate more often  No blood in stool  frormed stool  Have not eating something differen  No fever  No chills   occas cough   Juan Springer

## 2020-03-29 NOTE — PROGRESS NOTES
Kapil Barragan is a 53year old male. HPI:     CC:  Patient presents with:  Lesion: LOV 2/7/20. New issue.  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 ROS:  Denies any other systemic complaints. No new or changeing lesions other than noted above. No fevers, chills, night sweats, unusual sun sensitivity. No other skin complaints.         History, medications, allergies r this does grow consider excision in future  As noted previously  Cherry red vascular papule at left chest reassurance regarding benign angioma. Multiple waxy tan papules nevi no suspicious lesions.     Previously noted lesions:   Skin tags lateral canthi

## 2020-03-31 NOTE — TELEPHONE ENCOUNTER
Spoke with patient--reports persistent symptoms below--asking Dr. Julien Garrett to call him directly to discuss symptoms and recommendations. \"Why hasn't he called me back? \"      Per the system protocol please contact patient to determine if they should be tr

## 2020-04-03 ENCOUNTER — TELEPHONE (OUTPATIENT)
Dept: INTERNAL MEDICINE CLINIC | Facility: CLINIC | Age: 55
End: 2020-04-03

## 2020-04-03 NOTE — TELEPHONE ENCOUNTER
Patient states he missed a call, it was around 4:20PM today. CCS is not seeing any notations on who called. Please call patient back.

## 2020-04-03 NOTE — TELEPHONE ENCOUNTER
Spoke with patient--continues to request to speak with Barry Vivas he is my doctor. I keep having this weird pain. It's like a stab in my pelvis. I feel a pressure in my bladder and it feels like I want to move a bowel and I don't go.  I did h

## 2020-04-07 ENCOUNTER — VIRTUAL PHONE E/M (OUTPATIENT)
Dept: INTERNAL MEDICINE CLINIC | Facility: CLINIC | Age: 55
End: 2020-04-07
Payer: MEDICAID

## 2020-04-07 DIAGNOSIS — R10.30 LOWER ABDOMINAL PAIN: Primary | ICD-10-CM

## 2020-04-07 PROCEDURE — 99212 OFFICE O/P EST SF 10 MIN: CPT | Performed by: INTERNAL MEDICINE

## 2020-04-07 NOTE — PROGRESS NOTES
Patient ID: Isabel Morejon is a 53year old male. Patient presents with:  Sick Call       Virtual/Telephone Check-In    Isabel Morejon verbally consents to a Virtual/Telephone Check-In service on 04/07/20.  Patient understands and accepts financial respons nose  Ragweed                 ITCHING    Social History    Socioeconomic History      Marital status:       Spouse name: Not on file      Number of children: Not on file      Years of education: Not on file      Highest education level: Not on file Asked        History of tanning: Not Asked        Outdoor occupation: Not Asked        Pt has a pacemaker: Not Asked        Pt has a defibrillator: Not Asked        Reaction to local anesthetic: No    Social History Narrative      Not on file      PHYSICAL

## 2020-05-10 ENCOUNTER — TELEPHONE (OUTPATIENT)
Dept: INTERNAL MEDICINE CLINIC | Facility: CLINIC | Age: 55
End: 2020-05-10

## 2020-05-10 NOTE — TELEPHONE ENCOUNTER
Paged on call to discuss one episode of blood mixed with his stool. Pt states he had a tinge of blood with his bowel movement today. No pain, some blood when he wiped. Denies any abdominal pain at this time but has had intermittent pain for a month.  No fev

## 2020-05-14 ENCOUNTER — LAB ENCOUNTER (OUTPATIENT)
Dept: LAB | Age: 55
End: 2020-05-14
Attending: INTERNAL MEDICINE
Payer: MEDICAID

## 2020-05-14 ENCOUNTER — OFFICE VISIT (OUTPATIENT)
Dept: INTERNAL MEDICINE CLINIC | Facility: CLINIC | Age: 55
End: 2020-05-14
Payer: MEDICAID

## 2020-05-14 VITALS
HEIGHT: 70 IN | HEART RATE: 93 BPM | DIASTOLIC BLOOD PRESSURE: 95 MMHG | WEIGHT: 202 LBS | SYSTOLIC BLOOD PRESSURE: 148 MMHG | TEMPERATURE: 99 F | BODY MASS INDEX: 28.92 KG/M2

## 2020-05-14 DIAGNOSIS — R10.30 LOWER ABDOMINAL PAIN: Primary | ICD-10-CM

## 2020-05-14 DIAGNOSIS — K92.1 BLOOD IN STOOL: ICD-10-CM

## 2020-05-14 DIAGNOSIS — R10.30 LOWER ABDOMINAL PAIN: ICD-10-CM

## 2020-05-14 PROCEDURE — 99214 OFFICE O/P EST MOD 30 MIN: CPT | Performed by: INTERNAL MEDICINE

## 2020-05-14 PROCEDURE — 85025 COMPLETE CBC W/AUTO DIFF WBC: CPT

## 2020-05-14 PROCEDURE — 36415 COLL VENOUS BLD VENIPUNCTURE: CPT

## 2020-05-14 PROCEDURE — 80053 COMPREHEN METABOLIC PANEL: CPT

## 2020-05-14 NOTE — H&P
[address] [facility] [address] Gastroenterology                                                                                                  Clinic History and Physical     Pa prior.    CBC revealed no anemia or leukocytosis. CMP demonstrated elevated glucose and mildly elevated ALT (66).     Last abdominal imaging (May 2018) includes a CT scan A/P with IV contrast only which shows no acute processes in the abdomen, age advanced Comment: wine    Drug use: No       Medications (Active prior to today's visit):  Current Outpatient Medications   Medication Sig Dispense Refill   • PEG 3350-KCl-NaBcb-NaCl-NaSulf (COLYTE WITH FLAVOR PACKS) 240 g Oral Recon Soln Take prep as directed by x4, and patient is having movements of all 4 extremities   Psych: Pt has a normal mood and affect, behavior is normal    Nursing note and vitals reviewed    Labs/Imaging:     Patient's labs and imaging were reviewed and discussed with patient today.   See H week.  I would recommend waiting another week then testing for H. pylori outpatient. If this is negative, start pantoprazole 40 mg daily x2-3 months with office follow-up at that time.   Consider EGD if patient's symptoms remain refractory despite the abov Visit:  Orders Placed This Encounter      H. Pylori Stool Ag, EIA      Meds This Visit:  Requested Prescriptions     Signed Prescriptions Disp Refills   • PEG 3350-KCl-NaBcb-NaCl-NaSulf (COLYTE WITH FLAVOR PACKS) 240 g Oral Recon Soln 1 Bottle 0     Sig: T

## 2020-05-14 NOTE — PROGRESS NOTES
Patient ID: Belle Cash is a 63year old male. Patient presents with:  Abdominal Pain: x 5 weeks  Blood In Stool       HISTORY OF PRESENT ILLNESS:   HPI  Patient presents for above.   In the middle 5-week history of on and off again abdominal pain main Solution, Instill 1 drop by ophthalmic route every night into both eyes, Disp: 3 Bottle, Rfl: 3    Allergies:  Calamari Oil [Squid*    ITCHING, SWELLING  Grass                   Runny nose  Ragweed                 ITCHING    Social History    Socioeconomic Not Asked        Back Care: Not Asked        Exercise: Not Asked        Bike Helmet: Not Asked        Seat Belt: Not Asked        Self-Exams: Not Asked        Grew up on a farm: Not Asked        History of tanning: Not Asked        Outdoor occupation: Not

## 2020-05-18 ENCOUNTER — TELEPHONE (OUTPATIENT)
Dept: INTERNAL MEDICINE CLINIC | Facility: CLINIC | Age: 55
End: 2020-05-18

## 2020-05-18 ENCOUNTER — OFFICE VISIT (OUTPATIENT)
Dept: GASTROENTEROLOGY | Facility: CLINIC | Age: 55
End: 2020-05-18
Payer: MEDICAID

## 2020-05-18 ENCOUNTER — TELEPHONE (OUTPATIENT)
Dept: GASTROENTEROLOGY | Facility: CLINIC | Age: 55
End: 2020-05-18

## 2020-05-18 VITALS
WEIGHT: 205 LBS | BODY MASS INDEX: 29.35 KG/M2 | DIASTOLIC BLOOD PRESSURE: 90 MMHG | HEART RATE: 81 BPM | HEIGHT: 70 IN | SYSTOLIC BLOOD PRESSURE: 150 MMHG

## 2020-05-18 DIAGNOSIS — R11.2 NAUSEA AND VOMITING, INTRACTABILITY OF VOMITING NOT SPECIFIED, UNSPECIFIED VOMITING TYPE: ICD-10-CM

## 2020-05-18 DIAGNOSIS — Z12.11 SCREENING FOR COLON CANCER: ICD-10-CM

## 2020-05-18 DIAGNOSIS — R10.30 LOWER ABDOMINAL PAIN: Primary | ICD-10-CM

## 2020-05-18 DIAGNOSIS — K21.9 GASTROESOPHAGEAL REFLUX DISEASE, ESOPHAGITIS PRESENCE NOT SPECIFIED: ICD-10-CM

## 2020-05-18 DIAGNOSIS — K62.5 RECTAL BLEEDING: ICD-10-CM

## 2020-05-18 DIAGNOSIS — Z12.11 COLON CANCER SCREENING: ICD-10-CM

## 2020-05-18 PROCEDURE — 99244 OFF/OP CNSLTJ NEW/EST MOD 40: CPT | Performed by: NURSE PRACTITIONER

## 2020-05-18 NOTE — TELEPHONE ENCOUNTER
Scheduling: Please schedule patient per orders below for a semiurgent procedure June 2020      -Schedule colonoscopy w/ Dr. Shantelle Zambrano or Dr. Alvina Crowell with IV [facility] or MAC  Diagnosis: Lower abdominal pain, screening for colon cancer, rectal bleeding  -El

## 2020-05-18 NOTE — TELEPHONE ENCOUNTER
I did not call him. Have him check his BP at his local pharmacy or purchase a machine to check and then send those reading to the office. Advise low salt diet as well.

## 2020-05-18 NOTE — TELEPHONE ENCOUNTER
Patient calling. Came from GI and saw NP. States he had elevated BP in office, 160's/100's. Chart review shows his BP was 150/90 in office.      Per patient he is due to have colonoscopy in June and he was told by the GI NP that his blood pressure needs to

## 2020-05-18 NOTE — PATIENT INSTRUCTIONS
1.  Complete CT scan  2. Complete H. pylori test  3.   Increase water, fiber intake, start probiotics, start MiraLAX 1-2 scoops daily which may be titrated up/down as needed

## 2020-05-18 NOTE — TELEPHONE ENCOUNTER
Per CSS, patient received a missed call from our office, informed CSS that there is no note about the call, she will tell patient. Dr Fiordaliza Gleason below, patient is following up.     Please reply to madhav: BG Reyes

## 2020-05-25 PROCEDURE — 87338 HPYLORI STOOL AG IA: CPT | Performed by: NURSE PRACTITIONER

## 2020-05-26 ENCOUNTER — APPOINTMENT (OUTPATIENT)
Dept: LAB | Age: 55
End: 2020-05-26
Attending: NURSE PRACTITIONER
Payer: MEDICAID

## 2020-06-01 ENCOUNTER — HOSPITAL ENCOUNTER (OUTPATIENT)
Dept: CT IMAGING | Facility: HOSPITAL | Age: 55
Discharge: HOME | End: 2020-06-01
Attending: NURSE PRACTITIONER
Payer: MEDICAID

## 2020-06-01 DIAGNOSIS — R10.30 LOWER ABDOMINAL PAIN: ICD-10-CM

## 2020-06-01 PROCEDURE — 74177 CT ABD & PELVIS W/CONTRAST: CPT | Performed by: NURSE PRACTITIONER

## 2020-06-01 RX ORDER — PANTOPRAZOLE SODIUM 40 MG/1
40 TABLET, DELAYED RELEASE ORAL
Qty: 90 TABLET | Refills: 0 | Status: SHIPPED | OUTPATIENT
Start: 2020-06-01 | End: 2020-10-22

## 2020-06-15 NOTE — TELEPHONE ENCOUNTER
Scheduled for:  Colonoscopy - [address]855  Provider Name:  Dr. Cherie Jj  Date:  9/15/20  Location:  [facility]  Sedation:  MAC  Time:  11:15 am (pt is aware to arrive at 10:15 am)  Prep:  Colyte, Prep instructions were given to pt over the phone, pt verbalized unde

## 2020-06-25 ENCOUNTER — OFFICE VISIT (OUTPATIENT)
Dept: OPHTHALMOLOGY | Facility: CLINIC | Age: 55
End: 2020-06-25
Payer: MEDICAID

## 2020-06-25 DIAGNOSIS — H40.1332 PIGMENTARY GLAUCOMA OF BOTH EYES, MODERATE STAGE: Primary | ICD-10-CM

## 2020-06-25 PROCEDURE — 99213 OFFICE O/P EST LOW 20 MIN: CPT | Performed by: OPHTHALMOLOGY

## 2020-06-25 NOTE — PROGRESS NOTES
Belle Cash is a 63year old male. HPI:     HPI     Pt has been taking Latanoprost OU qhs as directed. Pt states that his vision is stable and has no ocular complaints.      Last edited by Corbin Noble O.T. on 6/25/2020  3:50 PM. (History) Allergies:    Calamari Oil [Squid*    ITCHING, SWELLING  Grass                   Runny nose  Ragweed                 ITCHING    ROS:       PHYSICAL EXAM:     Base Eye Exam     Visual Acuity (Snellen - Linear)       Right Left    Dist sc 20/30 20/30

## 2020-06-25 NOTE — PATIENT INSTRUCTIONS
Pigmentary glaucoma of both eyes, moderate stage  IOP is stable. Continue Latanoprost at bedtime in both eyes. Will have patient back in 4 months for a pressure check.

## 2020-09-10 ENCOUNTER — TELEPHONE (OUTPATIENT)
Dept: GASTROENTEROLOGY | Facility: CLINIC | Age: 55
End: 2020-09-10

## 2020-09-10 NOTE — TELEPHONE ENCOUNTER
Patient requesting PReps rx to be sent to pharm and requesting for instructions. PLease call. Thank you.

## 2020-09-10 NOTE — TELEPHONE ENCOUNTER
We reviewed in length his bowel prep and diet instructions, he does not have access to his mychart. His procedure is too soon to mail his instructions.      Future Appointments   Date Time Provider Debbie Jarvis   9/12/2020 10:30 AM ALEX KATE [facility]

## 2020-09-11 NOTE — TELEPHONE ENCOUNTER
I spoke with Shanel Trejo we discussed options of waiting for PA (and possible denial) and rescheduling his procedure.     We found a good Rx coupon for Gavilyte C at [facility] for $14.90     He will try to use this coupon, while I wait for the fax from [facility]

## 2020-09-11 NOTE — TELEPHONE ENCOUNTER
I spoke with Claire Meyers, he was very upset that his Gavilyte-C cost $23.99. Insurance does not cover this. \"Drug not formulary\"    I offered to switch to Gavilyte-G which would be zero cost to the pt. He refused saying he would not tolerate this.  I explained t

## 2020-09-12 ENCOUNTER — APPOINTMENT (OUTPATIENT)
Dept: LAB | Age: 55
End: 2020-09-12
Attending: INTERNAL MEDICINE
Payer: MEDICAID

## 2020-09-12 DIAGNOSIS — Z01.818 PREOP TESTING: ICD-10-CM

## 2020-09-13 LAB — SARS-COV-2 RNA RESP QL NAA+PROBE: NOT DETECTED

## 2020-09-14 ENCOUNTER — TELEPHONE (OUTPATIENT)
Dept: GASTROENTEROLOGY | Facility: CLINIC | Age: 55
End: 2020-09-14

## 2020-09-14 NOTE — TELEPHONE ENCOUNTER
Pt called again requesting to speak with RN regarding prep instructions. Pt inquiring if he can  Have coffee. Attempt to transfer no answer.  Please call

## 2020-09-14 NOTE — TELEPHONE ENCOUNTER
Patient called in stating he has questions about the procedure tomorrow. He did not go into details.  Please advise thank you

## 2020-09-15 ENCOUNTER — HOSPITAL ENCOUNTER (OUTPATIENT)
Facility: HOSPITAL | Age: 55
Setting detail: HOSPITAL OUTPATIENT SURGERY
Discharge: HOME | End: 2020-09-15
Attending: INTERNAL MEDICINE | Admitting: INTERNAL MEDICINE
Payer: MEDICAID

## 2020-09-15 ENCOUNTER — ANESTHESIA (OUTPATIENT)
Dept: ENDOSCOPY | Facility: HOSPITAL | Age: 55
End: 2020-09-15
Payer: MEDICAID

## 2020-09-15 ENCOUNTER — ANESTHESIA EVENT (OUTPATIENT)
Dept: ENDOSCOPY | Facility: HOSPITAL | Age: 55
End: 2020-09-15
Payer: MEDICAID

## 2020-09-15 VITALS
HEIGHT: 70 IN | OXYGEN SATURATION: 95 % | SYSTOLIC BLOOD PRESSURE: 130 MMHG | RESPIRATION RATE: 18 BRPM | TEMPERATURE: 99 F | WEIGHT: 200 LBS | BODY MASS INDEX: 28.63 KG/M2 | DIASTOLIC BLOOD PRESSURE: 63 MMHG | HEART RATE: 72 BPM

## 2020-09-15 DIAGNOSIS — Z01.818 PREOP TESTING: Primary | ICD-10-CM

## 2020-09-15 DIAGNOSIS — K62.5 RECTAL BLEEDING: ICD-10-CM

## 2020-09-15 DIAGNOSIS — Z12.11 COLON CANCER SCREENING: ICD-10-CM

## 2020-09-15 DIAGNOSIS — R10.30 LOWER ABDOMINAL PAIN: ICD-10-CM

## 2020-09-15 PROCEDURE — 0DBN8ZX EXCISION OF SIGMOID COLON, VIA NATURAL OR ARTIFICIAL OPENING ENDOSCOPIC, DIAGNOSTIC: ICD-10-PCS | Performed by: INTERNAL MEDICINE

## 2020-09-15 PROCEDURE — 0DBK8ZX EXCISION OF ASCENDING COLON, VIA NATURAL OR ARTIFICIAL OPENING ENDOSCOPIC, DIAGNOSTIC: ICD-10-PCS | Performed by: INTERNAL MEDICINE

## 2020-09-15 PROCEDURE — 45385 COLONOSCOPY W/LESION REMOVAL: CPT | Performed by: INTERNAL MEDICINE

## 2020-09-15 PROCEDURE — 0DBL8ZX EXCISION OF TRANSVERSE COLON, VIA NATURAL OR ARTIFICIAL OPENING ENDOSCOPIC, DIAGNOSTIC: ICD-10-PCS | Performed by: INTERNAL MEDICINE

## 2020-09-15 RX ORDER — NALOXONE HYDROCHLORIDE 0.4 MG/ML
80 INJECTION, SOLUTION INTRAMUSCULAR; INTRAVENOUS; SUBCUTANEOUS AS NEEDED
Status: DISCONTINUED | OUTPATIENT
Start: 2020-09-15 | End: 2020-09-15

## 2020-09-15 RX ORDER — LIDOCAINE HYDROCHLORIDE 10 MG/ML
INJECTION, SOLUTION EPIDURAL; INFILTRATION; INTRACAUDAL; PERINEURAL AS NEEDED
Status: DISCONTINUED | OUTPATIENT
Start: 2020-09-15 | End: 2020-09-15 | Stop reason: SURG

## 2020-09-15 RX ORDER — SODIUM CHLORIDE, SODIUM LACTATE, POTASSIUM CHLORIDE, CALCIUM CHLORIDE 600; 310; 30; 20 MG/100ML; MG/100ML; MG/100ML; MG/100ML
INJECTION, SOLUTION INTRAVENOUS CONTINUOUS
Status: DISCONTINUED | OUTPATIENT
Start: 2020-09-15 | End: 2020-09-15

## 2020-09-15 RX ORDER — DEXTROSE MONOHYDRATE 25 G/50ML
50 INJECTION, SOLUTION INTRAVENOUS
Status: DISCONTINUED | OUTPATIENT
Start: 2020-09-15 | End: 2020-09-15

## 2020-09-15 RX ADMIN — SODIUM CHLORIDE, SODIUM LACTATE, POTASSIUM CHLORIDE, CALCIUM CHLORIDE: 600; 310; 30; 20 INJECTION, SOLUTION INTRAVENOUS at 12:39:00

## 2020-09-15 RX ADMIN — LIDOCAINE HYDROCHLORIDE 20 MG: 10 INJECTION, SOLUTION EPIDURAL; INFILTRATION; INTRACAUDAL; PERINEURAL at 12:18:00

## 2020-09-15 NOTE — ANESTHESIA POSTPROCEDURE EVALUATION
Patient: Yinka Rea    Procedure Summary     Date:  09/15/20 Room / Location:  [facility] ENDOSCOPY 04 / [facility] ENDOSCOPY    Anesthesia Start:  5497 Anesthesia Stop:  3120    Procedure:  COLONOSCOPY (N/A ) Diagnosis:       Lower abdominal pain      Colon cancer

## 2020-09-15 NOTE — ANESTHESIA PREPROCEDURE EVALUATION
Anesthesia PreOp Note    HPI:     J Luis Griffin is a 63year old male who presents for preoperative consultation requested by: Manuel Castle MD    Date of Surgery: 9/15/2020    Procedure(s):  COLONOSCOPY  Indication: Lower abdominal pain, Colon ca 1/30/2020- pt had been lost to follow up since 2016 and came in with IOP of 27 OU and abnormal VF and OCT OS- and was restarted on Latanoprost OS and started Latanoprost OD due to [address]       Past Surgical History:   Procedure Laterality Date   • [facility] Alcohol use:  Yes        Alcohol/week: 0.0 standard drinks        Frequency: 4 or more times a week        Drinks per session: 1 or 2        Binge frequency: Never        Comment:  1-2 3-4 xx  a wk      Drug use: No      Sexual activity: Not on file height is 1.778 m (5' 10\") and weight is 90.7 kg (200 lb). His oral temperature is 98.5 °F (36.9 °C). His blood pressure is 155/91 (abnormal) and his pulse is 75. His respiration is 20 and oxygen saturation is 96%.     09/15/20  1045 09/15/20  1056   BP:

## 2020-09-15 NOTE — OPERATIVE REPORT
[facility] Endoscopy Report      Date of Procedure:  09/15/20      Preoperative Diagnosis:  Colorectal cancer screening      Postoperative Diagnosis:  1. Colon polyps  2.   Colonic diverticulosis      Procedure:    Colonoscopy with polypect in the right colon and several diverticula in the sigmoid colon without current signs of complication. There were no other colonic polyps, mass lesions, vascular anomalies or signs of inflammation seen.   Retroflexion in the rectum revealed no abnormalitie

## 2020-09-15 NOTE — H&P
History & Physical Examination    Patient Name: Jerrell Vela  MRN: [id number]  CSN: [id number]  YOB: 1965    Diagnosis: Colorectal cancer screening      latanoprost 0.005 % Ophthalmic Solution, Instill 1 drop by ophthalmic route every night Never Smoker      Smokeless tobacco: Never Used    Alcohol use:  Yes      Alcohol/week: 0.0 standard drinks      Frequency: 4 or more times a week      Drinks per session: 1 or 2      Binge frequency: Never      Comment:  1-2 3-4 xx  a wk      SYSTEM Check

## 2020-09-17 ENCOUNTER — OFFICE VISIT (OUTPATIENT)
Dept: INTERNAL MEDICINE CLINIC | Facility: CLINIC | Age: 55
End: 2020-09-17
Payer: MEDICAID

## 2020-09-17 ENCOUNTER — TELEPHONE (OUTPATIENT)
Dept: INTERNAL MEDICINE CLINIC | Facility: CLINIC | Age: 55
End: 2020-09-17

## 2020-09-17 ENCOUNTER — TELEPHONE (OUTPATIENT)
Dept: GASTROENTEROLOGY | Facility: CLINIC | Age: 55
End: 2020-09-17

## 2020-09-17 VITALS
HEART RATE: 72 BPM | BODY MASS INDEX: 29 KG/M2 | WEIGHT: 200 LBS | OXYGEN SATURATION: 98 % | TEMPERATURE: 98 F | DIASTOLIC BLOOD PRESSURE: 85 MMHG | SYSTOLIC BLOOD PRESSURE: 136 MMHG

## 2020-09-17 DIAGNOSIS — R51.9 HEADACHE DISORDER: ICD-10-CM

## 2020-09-17 DIAGNOSIS — R06.83 SNORING: Primary | ICD-10-CM

## 2020-09-17 DIAGNOSIS — Z12.11 COLON CANCER SCREENING: ICD-10-CM

## 2020-09-17 DIAGNOSIS — R06.81 APNEA: ICD-10-CM

## 2020-09-17 PROCEDURE — 3075F SYST BP GE 130 - 139MM HG: CPT | Performed by: INTERNAL MEDICINE

## 2020-09-17 PROCEDURE — 3079F DIAST BP 80-89 MM HG: CPT | Performed by: INTERNAL MEDICINE

## 2020-09-17 PROCEDURE — 99214 OFFICE O/P EST MOD 30 MIN: CPT | Performed by: INTERNAL MEDICINE

## 2020-09-17 NOTE — PROGRESS NOTES
Patient ID: Femi David is a 63year old male. Patient presents with:  Sleep Problem       HISTORY OF PRESENT ILLNESS:   HPI  Patient presents for above. Here for multiple issues.     States his wife is noticed that he has been snoring and having apne • ELECTROCARDIOGRAM, COMPLETE  02-    Scanned to media tab          Current Outpatient Medications:   •  latanoprost 0.005 % Ophthalmic Solution, Instill 1 drop by ophthalmic route every night into both eyes, Disp: 3 Bottle, Rfl: 3    Allergies: Blood Transfusions: Not Asked        Caffeine Concern: Yes          Daily; coffee, 1 cup         Occupational Exposure: Not Asked        Hobby Hazards: Not Asked        Sleep Concern: Not Asked        Stress Concern: Not Asked        Weight Concern: Not A REFERRAL HOME SLEEP APNEA TEST  · GENERAL SLEEP STUDY TRANSCRIPTION; Future    4. Colon cancer screening  · Repeat colonoscopy in 2023. · Explained the reasoning behind such quick turnaround after having first colonoscopy.     Return if symptoms worsen or

## 2020-09-17 NOTE — PATIENT INSTRUCTIONS
Obstructive Sleep Apnea  Obstructive sleep apnea is a condition that causes your air passages to become narrowed or blocked during sleep. As a result, breathing stops for short periods.  Your body wakes up enough for breathing to start again, though you d · Use positive airway pressure (PAP): Discuss with your healthcare provider the benefits of using PAP at home and the type of PAP that is best for you.   Follow-up care  Follow up with your healthcare provider, or as advised. A diagnosis of sleep apnea is m

## 2020-09-17 NOTE — TELEPHONE ENCOUNTER
Entered into Epic:     Recall for _CLN___per __Stathopoulos___in __3 years____  Last WUWR:7-  Next VQZ:7-  [facility]

## 2020-09-17 NOTE — TELEPHONE ENCOUNTER
Patient calling reports had  colonoscopy and was told to call PCP; post procedure was told has symptoms of  sleep apnea during the procedure    Pt states \" my wife tells me a snore a lot and when I wakes up most fays I feel like I never slept \"     Was t

## 2020-09-17 NOTE — TELEPHONE ENCOUNTER
----- Message from Scott Judge MD sent at 9/16/2020  6:12 PM CDT -----  I spoke to the patient. He is feeling well. He had #3 adenomatous polyps removed including a tubulovillous adenoma. I have discussed the significance.   I have recommended a

## 2020-10-19 ENCOUNTER — OFFICE VISIT (OUTPATIENT)
Dept: SLEEP CENTER | Age: 55
End: 2020-10-19
Attending: INTERNAL MEDICINE
Payer: MEDICAID

## 2020-10-19 DIAGNOSIS — R06.83 SNORING: ICD-10-CM

## 2020-10-19 DIAGNOSIS — R06.81 APNEA: ICD-10-CM

## 2020-10-19 DIAGNOSIS — R51.9 HEADACHE DISORDER: ICD-10-CM

## 2020-10-19 DIAGNOSIS — G47.33 OSA (OBSTRUCTIVE SLEEP APNEA): Primary | ICD-10-CM

## 2020-10-19 PROCEDURE — 95806 SLEEP STUDY UNATT&RESP EFFT: CPT

## 2020-10-22 ENCOUNTER — ORDER TRANSCRIPTION (OUTPATIENT)
Dept: SLEEP CENTER | Age: 55
End: 2020-10-22

## 2020-10-22 ENCOUNTER — TELEPHONE (OUTPATIENT)
Dept: INTERNAL MEDICINE CLINIC | Facility: CLINIC | Age: 55
End: 2020-10-22

## 2020-10-22 DIAGNOSIS — G47.30 INSOMNIA WITH SLEEP APNEA: Primary | ICD-10-CM

## 2020-10-22 DIAGNOSIS — G47.00 INSOMNIA WITH SLEEP APNEA: Primary | ICD-10-CM

## 2020-10-22 RX ORDER — ZOLPIDEM TARTRATE 10 MG/1
10 TABLET ORAL NIGHTLY PRN
Qty: 1 TABLET | Refills: 0 | Status: SHIPPED | OUTPATIENT
Start: 2020-10-22 | End: 2021-01-25 | Stop reason: ALTCHOICE

## 2020-10-22 RX ORDER — PANTOPRAZOLE SODIUM 40 MG/1
40 TABLET, DELAYED RELEASE ORAL
Qty: 90 TABLET | Refills: 0 | Status: SHIPPED | OUTPATIENT
Start: 2020-10-22 | End: 2020-11-21

## 2020-10-22 NOTE — PROCEDURES
[facility]  Accredited by the [place] [employer] (AASM)    PATIENT'S NAME: Giorgi Sharma   ATTENDING PHYSICIAN: Сергей Herrera MD   REFERRING PHYSICIAN: Сергей Herrera MD   PATIENT ACCOUNT #: [de-identified] LOCATION: [facility] Center G47. 33). RECOMMENDATIONS:    1. CPAP titration. 2.   Weight loss. 3.   Avoid alcohol. 4.   Avoid sedating drug. 5.   Patient should not drive if at all sleepy.     Please do not hesitate to contact me if there is any question whatsoever regarding

## 2020-10-22 NOTE — TELEPHONE ENCOUNTER
Dr Sarah Correa,    Pt has scheduled his sleep study for November 30th, 2020. Pt stated will have a hard time sleeping in strange environment with all the equipment surrounding him. Pt requesting one sleeping pill to take prior to his appt. Please advise.       P

## 2020-10-22 NOTE — TELEPHONE ENCOUNTER
Patient calling asking for refill for Protonix, explained to pt was rx per GI      Requesting for Dr. Tracie Fowler to fill the RX as he is having heartburn again     Med pended for your review / approval

## 2020-11-09 ENCOUNTER — TELEPHONE (OUTPATIENT)
Dept: INTERNAL MEDICINE CLINIC | Facility: CLINIC | Age: 55
End: 2020-11-09

## 2020-11-09 DIAGNOSIS — R06.81 APNEA: Primary | ICD-10-CM

## 2020-11-09 NOTE — TELEPHONE ENCOUNTER
Hello,    Patient's health plan has denied the PA request for in lab sleep study titration. Please see detail denial letter below. Thank you, Jose Charles Specialist    Managed Care.

## 2020-11-10 NOTE — TELEPHONE ENCOUNTER
Patient called to follow-up on message below. Spoke to Dr. Sudhir Eddy. Per Dr. Sudhir Eddy he cannot do peer review today. Per Dr. Sudhir Eddy, he will do review soon. Patient states he needs peer to peer review to be done as soon as possible.    Per patient, he h

## 2020-11-10 NOTE — TELEPHONE ENCOUNTER
Home sleep study result were sent to health plan. Please see detail letter below, stating patient will need APAP. Thank you, Kezia Bridges Specialist    Managed Care.

## 2020-11-10 NOTE — TELEPHONE ENCOUNTER
Patient was told by insurance that they were denied and to call us. Patient would like to be called, is agitated about denial and requesting to speak with someone clinical for more information.

## 2020-11-10 NOTE — TELEPHONE ENCOUNTER
Managed Care: Can you please contact patient regarding this message. Patient is very agitated.  He states titration was denied by insurance because insurance company was never provided the results of his original sleep study and insurance is requesting denise

## 2020-11-11 ENCOUNTER — OFFICE VISIT (OUTPATIENT)
Dept: OPHTHALMOLOGY | Facility: CLINIC | Age: 55
End: 2020-11-11
Payer: MEDICAID

## 2020-11-11 DIAGNOSIS — H40.1332 PIGMENTARY GLAUCOMA OF BOTH EYES, MODERATE STAGE: Primary | ICD-10-CM

## 2020-11-11 PROCEDURE — 99213 OFFICE O/P EST LOW 20 MIN: CPT | Performed by: OPHTHALMOLOGY

## 2020-11-11 NOTE — PROGRESS NOTES
Luciana Cunningham is a 63year old male. HPI:     HPI     Consult      Additional comments: Per Dr. Bethel Kendrick               Comments     Here for an IOP check. Pt is taking Latanoprost OU qhs as directed.  Pt states that his vision is stable and has no ocular c (10 mg total) by mouth nightly as needed for Sleep.  1 tablet 0   • Pantoprazole Sodium 40 MG Oral Tab EC Take 1 tablet (40 mg total) by mouth every morning before breakfast. 90 tablet 0   • latanoprost 0.005 % Ophthalmic Solution Instill 1 drop by ophthalm months (on 3/11/2021) for Visual field, OCT, Complete eye exam.    11/11/2020  Scribed by: Jairon Hendrix MD

## 2020-11-11 NOTE — TELEPHONE ENCOUNTER
Advised patient of Dr. Ross Goldsmith note. Patient verbalized understanding  Patient upset stating he is not sure why insurance company refusing  CPAP titration. Advised patient  to call [facility] for more details.      Patient does not want to use Auto Pap at th

## 2020-11-11 NOTE — ASSESSMENT & PLAN NOTE
IOP is stable. Continue Latanoprost at bedtime in both eyes.    Will have patient back in 4 months for a visual field, OCT and dilated eye exam.

## 2020-11-11 NOTE — PATIENT INSTRUCTIONS
Pigmentary glaucoma of both eyes, moderate stage  IOP is stable. Continue Latanoprost at bedtime in both eyes.    Will have patient back in 4 months for a visual field, OCT and dilated eye exam.

## 2020-11-11 NOTE — TELEPHONE ENCOUNTER
Spoke with Dr. Sivan Kuo of [facility]. CPAP titration denied despite [id number] review. Patient needs to do a 6-week trial of AutoPap. If after 6-week he still has symptoms then CPAP titration will be possibly approved.   Please let patient know and pend DME

## 2020-11-17 NOTE — TELEPHONE ENCOUNTER
The patient stated he will take the Auto pap machine at this time. He stated he tried to appeal and lost.  He would like this done ASAP.

## 2020-11-20 NOTE — TELEPHONE ENCOUNTER
Hello,    Please note that patient's health plan does not require referrals. Please fax order to [facility] as they will obtain PA for DME. Thank you, Benjy Wren Specialist    Managed Care.

## 2020-12-17 ENCOUNTER — OFFICE VISIT (OUTPATIENT)
Dept: INTERNAL MEDICINE CLINIC | Facility: CLINIC | Age: 55
End: 2020-12-17
Payer: MEDICAID

## 2020-12-17 VITALS
SYSTOLIC BLOOD PRESSURE: 133 MMHG | DIASTOLIC BLOOD PRESSURE: 87 MMHG | BODY MASS INDEX: 28.2 KG/M2 | HEART RATE: 93 BPM | WEIGHT: 197 LBS | RESPIRATION RATE: 16 BRPM | HEIGHT: 70 IN

## 2020-12-17 DIAGNOSIS — R73.01 IMPAIRED FASTING GLUCOSE: ICD-10-CM

## 2020-12-17 DIAGNOSIS — E78.00 HYPERCHOLESTEROLEMIA: ICD-10-CM

## 2020-12-17 DIAGNOSIS — I10 PRIMARY HYPERTENSION: ICD-10-CM

## 2020-12-17 DIAGNOSIS — Z99.89 OSA ON CPAP: ICD-10-CM

## 2020-12-17 DIAGNOSIS — Z00.00 ANNUAL PHYSICAL EXAM: Primary | ICD-10-CM

## 2020-12-17 DIAGNOSIS — Z12.11 COLON CANCER SCREENING: ICD-10-CM

## 2020-12-17 DIAGNOSIS — G47.33 OSA ON CPAP: ICD-10-CM

## 2020-12-17 PROCEDURE — 99396 PREV VISIT EST AGE 40-64: CPT | Performed by: INTERNAL MEDICINE

## 2020-12-17 PROCEDURE — 3008F BODY MASS INDEX DOCD: CPT | Performed by: INTERNAL MEDICINE

## 2020-12-17 PROCEDURE — 3075F SYST BP GE 130 - 139MM HG: CPT | Performed by: INTERNAL MEDICINE

## 2020-12-17 PROCEDURE — 3079F DIAST BP 80-89 MM HG: CPT | Performed by: INTERNAL MEDICINE

## 2020-12-17 PROCEDURE — 99213 OFFICE O/P EST LOW 20 MIN: CPT | Performed by: INTERNAL MEDICINE

## 2020-12-17 NOTE — PATIENT INSTRUCTIONS
Prevention Guidelines, Men Ages 48 to 59  Screening tests and vaccines are an important part of managing your health. A screening test is done to find possible disorders or diseases in people who don't have any symptoms.  The goal is to find a disease early triglycerides All men in this age group At least every 5 years   HIV Men at increased risk for infection – talk with your healthcare provider At routine exams   Lung cancer Adults age 54 to [de-identified] who have smoked Yearly screening in smokers with 30 pack-year h polysaccharide vaccine (PPSV23) Men at increased risk for infection – talk with your healthcare provider PCV13: 1 dose ages 23 to 72 (protects against 13 types of pneumococcal bacteria)     PPSV23: 1 to 2 doses through age 59, or 1 dose at 72 or older (pro tired after a full night's sleep, waking up with headaches, feeling very sleepy or falling asleep during the day, and having problems with memory or concentration.    Risk factors for sleep apnea include:  · Being overweight  · Being a man, or a woman in me sleep. Seeing your provider is important because sleep apnea can make you more likely to have certain health problems. These include high blood pressure, heart attack, stroke, and sexual dysfunction.  If you have sleep apnea, talk with your healthcare pro

## 2020-12-17 NOTE — PROGRESS NOTES
Patient ID: Rick Tobar is a 63year old male. Patient presents with:  Physical       HISTORY OF PRESENT ILLNESS:   HPI  Patient presents for above. Here for annual physical.    In 2018 had labs done which showed elevated cholesterol.   Was started on 2/9/2016   • Pigmentary glaucoma of both eyes, moderate stage 2/9/2016 1/30/2020- pt had been lost to follow up since 2016 and came in with IOP of 27 OU and abnormal VF and OCT OS- and was restarted on Latanoprost OS and started Latanoprost OD due to [facility] Not on file        Gets together: Not on file        Attends Tenriism service: Not on file        Active member of club or organization: Not on file        Attends meetings of clubs or organizations: Not on file        Relationship status: Not on file Soft. Bowel sounds are normal. He exhibits no distension. There is no abdominal tenderness. There is no rebound. Musculoskeletal: Normal range of motion. Neurological: He is alert. Psychiatric: He has a normal mood and affect.  His behavior is normal.

## 2020-12-21 ENCOUNTER — TELEPHONE (OUTPATIENT)
Dept: INTERNAL MEDICINE CLINIC | Facility: CLINIC | Age: 55
End: 2020-12-21

## 2020-12-21 DIAGNOSIS — G47.33 OSA (OBSTRUCTIVE SLEEP APNEA): Primary | ICD-10-CM

## 2020-12-21 NOTE — TELEPHONE ENCOUNTER
Dr. Liseth Jeffrey, spoke to patient who states in [place] he was given a DPAP machine. Informed patient that Im no aware of what a DPAP machine is, based on what he is requesting I believe is requesting an order for a Bi-PAP machine.  Patient requested I speak to [facility]

## 2020-12-21 NOTE — TELEPHONE ENCOUNTER
Dr Joe Ibarra, please advise. Patient said given his sleep study results, apnea every hour, a DPAP (he used this in [facility]), would help with inhaling/exhaling. He can only use a machine for short times at a time, 2 hours a night.  He was told the DPAP is bett

## 2020-12-21 NOTE — TELEPHONE ENCOUNTER
I am not sure what a DPAP machine is. I can refer him to sleep specialist to help with this if he likes.

## 2020-12-22 ENCOUNTER — MED REC SCAN ONLY (OUTPATIENT)
Dept: INTERNAL MEDICINE CLINIC | Facility: CLINIC | Age: 55
End: 2020-12-22

## 2020-12-22 ENCOUNTER — OFFICE VISIT (OUTPATIENT)
Dept: PULMONOLOGY | Facility: CLINIC | Age: 55
End: 2020-12-22
Payer: MEDICAID

## 2020-12-22 VITALS
RESPIRATION RATE: 18 BRPM | HEART RATE: 83 BPM | SYSTOLIC BLOOD PRESSURE: 140 MMHG | DIASTOLIC BLOOD PRESSURE: 84 MMHG | OXYGEN SATURATION: 96 % | TEMPERATURE: 97 F | BODY MASS INDEX: 29.33 KG/M2 | HEIGHT: 69 IN | WEIGHT: 198 LBS

## 2020-12-22 DIAGNOSIS — Z99.89 OSA ON CPAP: Primary | ICD-10-CM

## 2020-12-22 DIAGNOSIS — G47.33 OSA ON CPAP: Primary | ICD-10-CM

## 2020-12-22 DIAGNOSIS — G47.33 OSA (OBSTRUCTIVE SLEEP APNEA): ICD-10-CM

## 2020-12-22 PROCEDURE — 3079F DIAST BP 80-89 MM HG: CPT | Performed by: INTERNAL MEDICINE

## 2020-12-22 PROCEDURE — 99243 OFF/OP CNSLTJ NEW/EST LOW 30: CPT | Performed by: INTERNAL MEDICINE

## 2020-12-22 PROCEDURE — 3077F SYST BP >= 140 MM HG: CPT | Performed by: INTERNAL MEDICINE

## 2020-12-22 PROCEDURE — 3008F BODY MASS INDEX DOCD: CPT | Performed by: INTERNAL MEDICINE

## 2020-12-22 NOTE — PROGRESS NOTES
Dear  Lauro Salmon  :           As you know, Quincy Carreon is a 69-year-old male who I am now evaluating for severe sleep disordered breathing. HISTORY OF PRESENT ILLNESS: The patient went to visit his mother in [place] recently.   She is a physician and she recognized father alive and well    ALLERGIES TO MEDICATIONS: None    MEDICATIONS: Zolpidem eyedrops    REVIEW OF SYSTEMS: Review of Systems:  Vision normal except for the glaucoma. Ear nose and throat normal. Bowel normal. Bladder function normal. No depression.  No

## 2020-12-30 ENCOUNTER — TELEPHONE (OUTPATIENT)
Dept: PULMONOLOGY | Facility: CLINIC | Age: 55
End: 2020-12-30

## 2020-12-30 NOTE — TELEPHONE ENCOUNTER
Good Morning    Called patient no answer-     Please contact Frank for peer to peer see below notes regarding referral [id number]  cpap sleep study      Gelacio Dear [phone] spoke with Marco Masters.  Tracking [id number]    Case [id number]     Stated glitch in system when uploaded clinicals received possibility prior to denial and need to peer to peer with clinical or provider    Peer to peer [phone] choose option 1  Takes 10 minutes or less    Informed him have sleep study that was uploaded, Chucho stated must of been a glitch    Thanks    [facility]

## 2020-12-30 NOTE — TELEPHONE ENCOUNTER
Proceed with CPAP titration. I have scheduled the [id number] for today 12/30 at 3:30 pm, which I will complete.

## 2020-12-30 NOTE — TELEPHONE ENCOUNTER
I completed [id number] and received approval for titration study. Approval #[id number], valid today through 3/30/2021. Nursing: Please call the patient to provide update that titration has been approved and he should now call sleep center to schedule this. Thank you!

## 2020-12-30 NOTE — TELEPHONE ENCOUNTER
Called patient and was able to get a  hold of him    Patient stated cannot sleep at night - patient stated has severe case of apnea. Dr. Jessica Rivas stated would benefit Bipap. Patient cannot sleep with Cpap machine. Originally his pcp ordered the bipap. I informed the bipap was not ordered only Sleep study. Informed him Physician assistant and clinical staff is working on his denial.    I will sent information to the staff.  He would appreciate a call back as soon as possible    Thanks    [facility]

## 2020-12-30 NOTE — TELEPHONE ENCOUNTER
I spoke with the patient and informed him of CPAP titration study approval. He will call to schedule. Dr. Laura Carpio. [id number] completed and CPAP titration approved in patient intolerant of auto CPAP. Spoke with sleep center and and they will trial CPAP then if intolerant switch to BiPAP.

## 2021-01-04 ENCOUNTER — TELEPHONE (OUTPATIENT)
Dept: INTERNAL MEDICINE CLINIC | Facility: CLINIC | Age: 56
End: 2021-01-04

## 2021-01-04 ENCOUNTER — ORDER TRANSCRIPTION (OUTPATIENT)
Dept: SLEEP CENTER | Age: 56
End: 2021-01-04

## 2021-01-04 ENCOUNTER — TELEPHONE (OUTPATIENT)
Dept: PULMONOLOGY | Facility: CLINIC | Age: 56
End: 2021-01-04

## 2021-01-04 DIAGNOSIS — G47.33 OSA (OBSTRUCTIVE SLEEP APNEA): Primary | ICD-10-CM

## 2021-01-04 NOTE — TELEPHONE ENCOUNTER
Karlene Felipe requesting new orders for BiPap titration. For additional questions please call. Thank you.

## 2021-01-04 NOTE — TELEPHONE ENCOUNTER
Pt states Dr. Erum Nuñez ordered another sleep study for him and he hasn't been able to schedule test. Pt states he has left messages for sleep center to call him back and hasn't received a return call. Advised pt to contact Dr. Neyda Carranza office.

## 2021-01-04 NOTE — TELEPHONE ENCOUNTER
Spoke with Dorian Olmedo at the [address] and states she will reach out to patient to schedule sleep study.

## 2021-01-05 ENCOUNTER — LAB ENCOUNTER (OUTPATIENT)
Dept: LAB | Age: 56
End: 2021-01-05
Attending: INTERNAL MEDICINE
Payer: MEDICAID

## 2021-01-05 ENCOUNTER — TELEPHONE (OUTPATIENT)
Dept: INTERNAL MEDICINE CLINIC | Facility: CLINIC | Age: 56
End: 2021-01-05

## 2021-01-05 DIAGNOSIS — E78.00 HYPERCHOLESTEROLEMIA: ICD-10-CM

## 2021-01-05 DIAGNOSIS — G47.33 OSA (OBSTRUCTIVE SLEEP APNEA): ICD-10-CM

## 2021-01-05 DIAGNOSIS — Z00.00 ANNUAL PHYSICAL EXAM: ICD-10-CM

## 2021-01-05 DIAGNOSIS — R73.01 IMPAIRED FASTING GLUCOSE: ICD-10-CM

## 2021-01-05 LAB
ALBUMIN SERPL-MCNC: 4 G/DL (ref 3.4–5)
ALBUMIN/GLOB SERPL: 1.1 {RATIO} (ref 1–2)
ALP LIVER SERPL-CCNC: 84 U/L
ALT SERPL-CCNC: 52 U/L
ANION GAP SERPL CALC-SCNC: 6 MMOL/L (ref 0–18)
AST SERPL-CCNC: 25 U/L (ref 15–37)
BASOPHILS # BLD AUTO: 0.05 X10(3) UL (ref 0–0.2)
BASOPHILS NFR BLD AUTO: 0.6 %
BILIRUB SERPL-MCNC: 0.4 MG/DL (ref 0.1–2)
BUN BLD-MCNC: 14 MG/DL (ref 7–18)
BUN/CREAT SERPL: 12.8 (ref 10–20)
CALCIUM BLD-MCNC: 9.3 MG/DL (ref 8.5–10.1)
CHLORIDE SERPL-SCNC: 104 MMOL/L (ref 98–112)
CHOLEST SMN-MCNC: 268 MG/DL (ref ?–200)
CO2 SERPL-SCNC: 28 MMOL/L (ref 21–32)
CREAT BLD-MCNC: 1.09 MG/DL
DEPRECATED RDW RBC AUTO: 40.2 FL (ref 35.1–46.3)
EOSINOPHIL # BLD AUTO: 0.79 X10(3) UL (ref 0–0.7)
EOSINOPHIL NFR BLD AUTO: 9.2 %
ERYTHROCYTE [DISTWIDTH] IN BLOOD BY AUTOMATED COUNT: 12.4 % (ref 11–15)
EST. AVERAGE GLUCOSE BLD GHB EST-MCNC: 117 MG/DL (ref 68–126)
GLOBULIN PLAS-MCNC: 3.6 G/DL (ref 2.8–4.4)
GLUCOSE BLD-MCNC: 146 MG/DL (ref 70–99)
HBA1C MFR BLD HPLC: 5.7 % (ref ?–5.7)
HCT VFR BLD AUTO: 47 %
HDLC SERPL-MCNC: 28 MG/DL (ref 40–59)
HGB BLD-MCNC: 15.8 G/DL
IMM GRANULOCYTES # BLD AUTO: 0.02 X10(3) UL (ref 0–1)
IMM GRANULOCYTES NFR BLD: 0.2 %
LDLC SERPL DIRECT ASSAY-MCNC: 171 MG/DL (ref ?–100)
LYMPHOCYTES # BLD AUTO: 2.72 X10(3) UL (ref 1–4)
LYMPHOCYTES NFR BLD AUTO: 31.7 %
M PROTEIN MFR SERPL ELPH: 7.6 G/DL (ref 6.4–8.2)
MCH RBC QN AUTO: 29.9 PG (ref 26–34)
MCHC RBC AUTO-ENTMCNC: 33.6 G/DL (ref 31–37)
MCV RBC AUTO: 89 FL
MONOCYTES # BLD AUTO: 0.8 X10(3) UL (ref 0.1–1)
MONOCYTES NFR BLD AUTO: 9.3 %
NEUTROPHILS # BLD AUTO: 4.21 X10 (3) UL (ref 1.5–7.7)
NEUTROPHILS # BLD AUTO: 4.21 X10(3) UL (ref 1.5–7.7)
NEUTROPHILS NFR BLD AUTO: 49 %
NONHDLC SERPL-MCNC: 240 MG/DL (ref ?–130)
OSMOLALITY SERPL CALC.SUM OF ELEC: 289 MOSM/KG (ref 275–295)
PATIENT FASTING Y/N/NP: NO
PATIENT FASTING Y/N/NP: NO
PLATELET # BLD AUTO: 262 10(3)UL (ref 150–450)
POTASSIUM SERPL-SCNC: 4.1 MMOL/L (ref 3.5–5.1)
PSA SERPL-MCNC: 1.9 NG/ML (ref ?–4)
RBC # BLD AUTO: 5.28 X10(6)UL
SODIUM SERPL-SCNC: 138 MMOL/L (ref 136–145)
TRIGL SERPL-MCNC: 417 MG/DL (ref 30–149)
TSI SER-ACNC: 1.48 MIU/ML (ref 0.36–3.74)
WBC # BLD AUTO: 8.6 X10(3) UL (ref 4–11)

## 2021-01-05 PROCEDURE — 36415 COLL VENOUS BLD VENIPUNCTURE: CPT

## 2021-01-05 PROCEDURE — 85025 COMPLETE CBC W/AUTO DIFF WBC: CPT

## 2021-01-05 PROCEDURE — 83036 HEMOGLOBIN GLYCOSYLATED A1C: CPT

## 2021-01-05 PROCEDURE — 80053 COMPREHEN METABOLIC PANEL: CPT

## 2021-01-05 PROCEDURE — 84443 ASSAY THYROID STIM HORMONE: CPT

## 2021-01-05 PROCEDURE — 84153 ASSAY OF PSA TOTAL: CPT

## 2021-01-05 PROCEDURE — 80061 LIPID PANEL: CPT

## 2021-01-05 PROCEDURE — 83721 ASSAY OF BLOOD LIPOPROTEIN: CPT

## 2021-01-05 NOTE — TELEPHONE ENCOUNTER
I will send a message to triage once all the labs have resulted. These let him know this.   At first glance there is nothing to be concerned about with this single abnormality as there is no other corroborating test to suggest something worrisome is going

## 2021-01-05 NOTE — TELEPHONE ENCOUNTER
Spoke with Nery Smith at [facility] requesting order for BiPAP titration study instead of CPAP titration study. Dr. Mary Ann - If agreeable, please review/sign BiPAP titration study order.

## 2021-01-05 NOTE — TELEPHONE ENCOUNTER
Patient states he saw his lab results for CBC done yesterday and has questions. Patient asking for call from Wallace Read. Advised patient that all of his labs that were drawn yesterday have not resulted yet and Wallace Read has yet to review them.     [facility]         Specimen Collected: 01/05/21 11:29 AM

## 2021-01-05 NOTE — TELEPHONE ENCOUNTER
Pt was called and informed of Dr. Stoddard Late message below and he verbalized understanding.  Thanks

## 2021-01-06 ENCOUNTER — TELEPHONE (OUTPATIENT)
Dept: INTERNAL MEDICINE CLINIC | Facility: CLINIC | Age: 56
End: 2021-01-06

## 2021-01-06 LAB — SARS-COV-2 RNA RESP QL NAA+PROBE: NOT DETECTED

## 2021-01-06 RX ORDER — ZOLPIDEM TARTRATE 10 MG/1
10 TABLET ORAL NIGHTLY PRN
Qty: 1 TABLET | Refills: 0 | Status: SHIPPED | OUTPATIENT
Start: 2021-01-06 | End: 2021-01-25 | Stop reason: ALTCHOICE

## 2021-01-06 RX ORDER — LATANOPROST 50 UG/ML
SOLUTION/ DROPS OPHTHALMIC
Qty: 3 BOTTLE | Refills: 3 | Status: SHIPPED | OUTPATIENT
Start: 2021-01-06 | End: 2021-10-28

## 2021-01-06 NOTE — TELEPHONE ENCOUNTER
The patient calling to state he is having a sleep study titration done tomorrow and is asking for a medication to help him sleep. Yamila Bahena     He has to be at the center at 8:00 pm and needs to be asleep by 9;00 pm.    He stated he usually goes to sleep at 2 to 3:

## 2021-01-06 NOTE — TELEPHONE ENCOUNTER
The patient is calling back to state the [facility] in [address] has the Zolpidem brand [facility].     I called the medication into the [facility] in [address] on [address]  ,

## 2021-01-06 NOTE — TELEPHONE ENCOUNTER
The patient is calling and stating the [facility] in [place] on [address] does not have the Brand of Zolpidem he had before. I stated he needs to find out what brand and call different pharmacies to check where he can locate it.   We can than call the Zolp

## 2021-01-06 NOTE — TELEPHONE ENCOUNTER
Pt states that he just picked up his zolpidem from [facility]. The pharm stated that this zolpidem is manufactured by a different . Pharm wanted pt to check with PCP to let him know this in case he wants to make any changes.

## 2021-01-06 NOTE — TELEPHONE ENCOUNTER
Left message that the medication was sent to the pharmacy and to call back if needed. Mychart message sent to the patient.

## 2021-01-06 NOTE — TELEPHONE ENCOUNTER
Per patient he insist to talk to nurses and patient is so rude about the rx med that was sent to his pharmacy, no more infos given.

## 2021-01-06 NOTE — TELEPHONE ENCOUNTER
Continue Latanoprost at bedtime in both eyes. Pt was seen on 11/1/20 and his next apt is scheduled on 3/2/21. Rx pended to [facility] to sign off on drops.

## 2021-01-06 NOTE — TELEPHONE ENCOUNTER
Spoke with Adrian Plunkett at [facility] informed her that BiPAP study orders were signed.

## 2021-01-06 NOTE — TELEPHONE ENCOUNTER
I spoke with the pharm. She states that the pt requested to have the zolpidem from the  [facility], but [place] gets their zolpidem from [facility]. If the pt wants to get this from another pharm, he will need a new prescription.   It cannot be tra

## 2021-01-08 ENCOUNTER — OFFICE VISIT (OUTPATIENT)
Dept: SLEEP CENTER | Age: 56
End: 2021-01-08
Attending: INTERNAL MEDICINE
Payer: MEDICAID

## 2021-01-08 DIAGNOSIS — G47.33 OSA (OBSTRUCTIVE SLEEP APNEA): Primary | ICD-10-CM

## 2021-01-08 PROCEDURE — 95811 POLYSOM 6/>YRS CPAP 4/> PARM: CPT

## 2021-01-11 ENCOUNTER — TELEPHONE (OUTPATIENT)
Dept: INTERNAL MEDICINE CLINIC | Facility: CLINIC | Age: 56
End: 2021-01-11

## 2021-01-11 ENCOUNTER — TELEPHONE (OUTPATIENT)
Dept: PULMONOLOGY | Facility: CLINIC | Age: 56
End: 2021-01-11

## 2021-01-11 DIAGNOSIS — R79.89 LOW VITAMIN D LEVEL: Primary | ICD-10-CM

## 2021-01-11 DIAGNOSIS — G47.33 OSA (OBSTRUCTIVE SLEEP APNEA): Primary | ICD-10-CM

## 2021-01-11 NOTE — TELEPHONE ENCOUNTER
Pt calling for test results and to talk to Rn about changing CPAP to Bipap - he states his O2 is high in the night

## 2021-01-11 NOTE — TELEPHONE ENCOUNTER
RN, please call the patient to let them know that the results still are not yet available. Within the next 2-3 days, he should be called.

## 2021-01-11 NOTE — TELEPHONE ENCOUNTER
The patient  is asking for a  vitamin D test to be ordered. I stated not all insurance will pay for  Vitamin D tests. He stated his insurance will pay for it.   He also stated is very concerned about his Vitamin D    He  also would like his sleep study

## 2021-01-11 NOTE — TELEPHONE ENCOUNTER
Dr Marina Rosario:  Patient called back again. He is adamant in getting Vit D level ordered. He hasn't had it done since 2014. I explained Dr. Marina Rosario is out of office today. We will contact patient as soon as provider responds. Odilon Hinton

## 2021-01-11 NOTE — TELEPHONE ENCOUNTER
Spoke with patient states he had BiPAP titration test on Friday 1/8/21 inquiring about results. States that his CPAP does not help him and he has not had a good night's sleep in 4 weeks, he is requesting a BiPAP machine. Dr. Lisette Helm - Please advise.

## 2021-01-12 NOTE — TELEPHONE ENCOUNTER
Patient calling and wants to follow up his request for Vitamin D, informed that there is no response from his provider yet, contact number verified. States that he also did his sleep study test on 1/8/21 and requesting test result. Per our record-no resu

## 2021-01-13 ENCOUNTER — TELEPHONE (OUTPATIENT)
Dept: INTERNAL MEDICINE CLINIC | Facility: CLINIC | Age: 56
End: 2021-01-13

## 2021-01-13 ENCOUNTER — LAB ENCOUNTER (OUTPATIENT)
Dept: LAB | Age: 56
End: 2021-01-13
Attending: INTERNAL MEDICINE
Payer: MEDICAID

## 2021-01-13 DIAGNOSIS — R79.89 LOW VITAMIN D LEVEL: ICD-10-CM

## 2021-01-13 DIAGNOSIS — E78.00 HYPERCHOLESTEROLEMIA: Primary | ICD-10-CM

## 2021-01-13 PROCEDURE — 36415 COLL VENOUS BLD VENIPUNCTURE: CPT

## 2021-01-13 PROCEDURE — 82306 VITAMIN D 25 HYDROXY: CPT

## 2021-01-13 RX ORDER — ROSUVASTATIN CALCIUM 10 MG/1
10 TABLET, COATED ORAL NIGHTLY
Qty: 90 TABLET | Refills: 0 | Status: SHIPPED | OUTPATIENT
Start: 2021-01-13 | End: 2021-04-12

## 2021-01-13 NOTE — TELEPHONE ENCOUNTER
Verified name and . Patient was advised that Vitamin D test has been ordered. Patient was transferred to central scheduling to schedule lab appointment.

## 2021-01-13 NOTE — TELEPHONE ENCOUNTER
Verified name and .  Patient calling to respond to the following message sent by Dr. Jayme Ferreira:  Patient Result Comments for LIPID PANEL    Viewed by Jace Lanier on 2021  8:37 AM  Written by Antony Navas MD on 2021  8:32 AM  Dwight Cotton,     Your labs

## 2021-01-13 NOTE — TELEPHONE ENCOUNTER
Verified name and . Patient was informed of Dr. Korey Dominique message as seen in previous charting note. Patient verbalizes understanding and agrees with plan.  Follow up appointment scheduled:    Future Appointments   Date Time Provider Debbie Jarvis 2

## 2021-01-13 NOTE — TELEPHONE ENCOUNTER
Crestor for 90 days prescribed to his pharmacy on file. Have him follow-up in the office 1 to 2 days before prescription runs out so we can recheck his cholesterol and see how he is doing.

## 2021-01-13 NOTE — PROCEDURES
[address]  Accredited by the [facility] ([facility])    PATIENT'S NAME: Clementina Records   ATTENDING PHYSICIAN: Yovanny Moreno MD   REFERRING PHYSICIAN: Yovanny Moreno MD   PATIENT ACCOUNT #: [de-identified] LOCATION: [facility] minutes in the supine position, 116 minutes in the left side position, and 42 minutes in the right side position.   The respiratory event related arousal index is 8.1 events per hour, and the spontaneous arousal index is 2.8 events per hour for a combined a

## 2021-01-13 NOTE — TELEPHONE ENCOUNTER
Vitamin D level ordered. He will need to get another blood test.  Pulmonology will need to discuss his sleep study results with him. Other labs were reviewed and my chart message sent to the patient.

## 2021-01-14 NOTE — TELEPHONE ENCOUNTER
Davey De Jesus MD   1/13/2021  3:35 PM      RN, I spoke with the patient. Rekha Bhatti set up with BiPAP 14/7 CWP with appropriate follow-up.

## 2021-01-15 ENCOUNTER — TELEPHONE (OUTPATIENT)
Dept: INTERNAL MEDICINE CLINIC | Facility: CLINIC | Age: 56
End: 2021-01-15

## 2021-01-15 LAB — 25(OH)D3 SERPL-MCNC: 42.6 NG/ML (ref 30–100)

## 2021-01-15 NOTE — TELEPHONE ENCOUNTER
Pt inquiring about his Vit D level. Lab states that Vitamin D levels are done Mon, wed, Friday. Results will be in later today.

## 2021-01-18 ENCOUNTER — TELEPHONE (OUTPATIENT)
Dept: PULMONOLOGY | Facility: CLINIC | Age: 56
End: 2021-01-18

## 2021-01-18 NOTE — TELEPHONE ENCOUNTER
Patient called in to follow up on this. He states that [facility] is requesting for patient's recent sleep titration study.  Please follow up

## 2021-01-18 NOTE — TELEPHONE ENCOUNTER
Spoke with patient states [facility] did not receive sleep titration study. Patient would like pulmo office to follow up. Spoke with Steffen Schwartz at [facility] states the only thing HME needs is new order. Patient is requesting ResMed machine.  HME needs new

## 2021-01-18 NOTE — TELEPHONE ENCOUNTER
Evan Dumont       1/18/21 2:15 PM  Note     Juliette/[facility] calling for mutual patient regarding patient wanting new order for Resmed Bipap. Please call to update at:[phone],thanks.   *REYNOLD [phone] / Sylvie Los: Sonu Marquez

## 2021-01-18 NOTE — TELEPHONE ENCOUNTER
Juliette/[facility] calling for mutual patient regarding patient wanting new order for Resmed Bipap. Please call to update at:[phone],thanks.   *CARISSA [phone] / Tangela Harrington: Nelly Horvath

## 2021-01-18 NOTE — TELEPHONE ENCOUNTER
Updated DME order faxed to [address] [place] [address] at [facility] [phone]. Fax confirmation received.

## 2021-01-21 ENCOUNTER — TELEPHONE (OUTPATIENT)
Dept: PULMONOLOGY | Facility: CLINIC | Age: 56
End: 2021-01-21

## 2021-01-21 DIAGNOSIS — G47.33 OSA TREATED WITH BIPAP: Primary | ICD-10-CM

## 2021-01-21 NOTE — TELEPHONE ENCOUNTER
Patient started using the Bipap machine and he is wondering if the pressure can be higher.  Please follow up

## 2021-01-21 NOTE — TELEPHONE ENCOUNTER
Pt is currently using BIPAP 14/7 CWP, states he does not feel he is get enough air and that pressure is small enough, states he wakes up sometimes short of breath, states last night his O2 went down to 80.

## 2021-01-22 NOTE — TELEPHONE ENCOUNTER
Spoke to New rick from [facility] states requesting new order to be faxed. New order with updated pressure setting faxed to [facility].

## 2021-01-25 ENCOUNTER — OFFICE VISIT (OUTPATIENT)
Dept: INTERNAL MEDICINE CLINIC | Facility: CLINIC | Age: 56
End: 2021-01-25
Payer: MEDICAID

## 2021-01-25 VITALS
HEART RATE: 77 BPM | OXYGEN SATURATION: 98 % | WEIGHT: 202 LBS | DIASTOLIC BLOOD PRESSURE: 80 MMHG | TEMPERATURE: 99 F | BODY MASS INDEX: 29.92 KG/M2 | SYSTOLIC BLOOD PRESSURE: 138 MMHG | HEIGHT: 69 IN

## 2021-01-25 DIAGNOSIS — G47.33 OSA (OBSTRUCTIVE SLEEP APNEA): Primary | ICD-10-CM

## 2021-01-25 DIAGNOSIS — E78.00 HYPERCHOLESTEROLEMIA: ICD-10-CM

## 2021-01-25 PROCEDURE — 3079F DIAST BP 80-89 MM HG: CPT | Performed by: INTERNAL MEDICINE

## 2021-01-25 PROCEDURE — 3008F BODY MASS INDEX DOCD: CPT | Performed by: INTERNAL MEDICINE

## 2021-01-25 PROCEDURE — 99214 OFFICE O/P EST MOD 30 MIN: CPT | Performed by: INTERNAL MEDICINE

## 2021-01-25 PROCEDURE — 3075F SYST BP GE 130 - 139MM HG: CPT | Performed by: INTERNAL MEDICINE

## 2021-01-25 NOTE — PROGRESS NOTES
Patient ID: Isabel Morejon is a 63year old male. Patient presents with: Follow - Up: pt reports here for follow up on cpap       HISTORY OF PRESENT ILLNESS:   HPI  Patient presents for above. Here for follow-up of multiple issues.     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 There is no abdominal tenderness. There is no rebound. Musculoskeletal: Normal range of motion. Neurological: He is alert. Psychiatric: He has a normal mood and affect.  His behavior is normal.         ASSESSMENT/PLAN:   1. MANAN (obstructiv

## 2021-01-25 NOTE — TELEPHONE ENCOUNTER
Spoke with Yasmine Yarbrough at [facility] states pressure was changed to 17/9 CWP remotely on 1/22/21 and patient was notified by E.

## 2021-01-29 ENCOUNTER — TELEPHONE (OUTPATIENT)
Dept: PULMONOLOGY | Facility: CLINIC | Age: 56
End: 2021-01-29

## 2021-01-29 DIAGNOSIS — G47.33 OSA (OBSTRUCTIVE SLEEP APNEA): Primary | ICD-10-CM

## 2021-01-29 NOTE — TELEPHONE ENCOUNTER
Patient states his PT states his occurrence is up to 26 now. Patient is wondering if his Bi-pap machine can be adjusted. He states with some adjustments his occurrences will go down.  Please follow up

## 2021-02-02 NOTE — TELEPHONE ENCOUNTER
Spoke with patient states he does not feel rested, he is still waking up in the middle of the night. Dr. Jb Bro - Data compliance report is in your folder for review. Please advise.

## 2021-02-16 ENCOUNTER — TELEPHONE (OUTPATIENT)
Dept: PULMONOLOGY | Facility: CLINIC | Age: 56
End: 2021-02-16

## 2021-02-16 DIAGNOSIS — G47.33 OSA (OBSTRUCTIVE SLEEP APNEA): Primary | ICD-10-CM

## 2021-02-18 NOTE — TELEPHONE ENCOUNTER
Spoke with patient states respiratory therapist is advising he get an Auto BiPAP machine, AHI is continually increasing. States that during deep sleep, he is having more apneic events.     Spoke with Ziggy Gould at [facility] patient called [facility]

## 2021-02-18 NOTE — TELEPHONE ENCOUNTER
RN, please have the durable medical equipment company increase his BiPAP setting to 17/12 and please make sure that they have his ramp function on with the longest ramp possible.

## 2021-02-26 ENCOUNTER — HOSPITAL ENCOUNTER (OUTPATIENT)
Age: 56
Discharge: HOME | End: 2021-02-26
Payer: MEDICAID

## 2021-02-26 VITALS
BODY MASS INDEX: 28.06 KG/M2 | DIASTOLIC BLOOD PRESSURE: 85 MMHG | HEIGHT: 70 IN | RESPIRATION RATE: 18 BRPM | WEIGHT: 196 LBS | SYSTOLIC BLOOD PRESSURE: 136 MMHG | TEMPERATURE: 97 F | OXYGEN SATURATION: 97 % | HEART RATE: 81 BPM

## 2021-02-26 DIAGNOSIS — Z20.822 EXPOSURE TO COVID-19 VIRUS: Primary | ICD-10-CM

## 2021-02-26 LAB — SARS-COV-2 RNA RESP QL NAA+PROBE: NOT DETECTED

## 2021-02-26 PROCEDURE — U0002 COVID-19 LAB TEST NON-CDC: HCPCS | Performed by: NURSE PRACTITIONER

## 2021-02-26 PROCEDURE — 99212 OFFICE O/P EST SF 10 MIN: CPT | Performed by: NURSE PRACTITIONER

## 2021-02-27 NOTE — ED PROVIDER NOTES
GERD,  Patient Seen in: [facility]      History   Patient presents with:  Cough/URI    Stated Complaint: stomach pain/ha/cough/covid test    HPI/Subjective:   HPI    This is a well-appearing 58-year-old with a history of hyperlipidemia, hyperte use: No             Review of Systems   Constitutional: Positive for chills. HENT: Positive for rhinorrhea. Respiratory: Positive for cough. All other systems reviewed and are negative.       Positive for stated complaint: stomach pain/ha/cough/covi Breath sounds: Normal breath sounds. Skin:     General: Skin is warm and dry. Capillary Refill: Capillary refill takes less than 2 seconds. Neurological:      General: No focal deficit present.       Mental Status: He is alert and oriented to [facility]

## 2021-03-02 ENCOUNTER — OFFICE VISIT (OUTPATIENT)
Dept: OPHTHALMOLOGY | Facility: CLINIC | Age: 56
End: 2021-03-02
Payer: MEDICAID

## 2021-03-02 DIAGNOSIS — H43.393 VITREOUS FLOATERS OF BOTH EYES: ICD-10-CM

## 2021-03-02 DIAGNOSIS — H40.1332 PIGMENTARY GLAUCOMA OF BOTH EYES, MODERATE STAGE: Primary | ICD-10-CM

## 2021-03-02 DIAGNOSIS — H25.13 AGE-RELATED NUCLEAR CATARACT OF BOTH EYES: ICD-10-CM

## 2021-03-02 PROCEDURE — 92015 DETERMINE REFRACTIVE STATE: CPT | Performed by: OPHTHALMOLOGY

## 2021-03-02 PROCEDURE — 92083 EXTENDED VISUAL FIELD XM: CPT | Performed by: OPHTHALMOLOGY

## 2021-03-02 PROCEDURE — 92133 CPTRZD OPH DX IMG PST SGM ON: CPT | Performed by: OPHTHALMOLOGY

## 2021-03-02 PROCEDURE — 92014 COMPRE OPH EXAM EST PT 1/>: CPT | Performed by: OPHTHALMOLOGY

## 2021-03-02 RX ORDER — LATANOPROST 50 UG/ML
SOLUTION/ DROPS OPHTHALMIC
Qty: 3 BOTTLE | Refills: 3 | Status: CANCELLED | OUTPATIENT
Start: 2021-03-02

## 2021-03-02 NOTE — ASSESSMENT & PLAN NOTE
Discussed moderate cataracts in both eyes that are not affecting vision and are not surgical at this time. Recommend +1.25 over the counter glasses for distance and +3.25 or +3.50 glasses for reading.

## 2021-03-02 NOTE — PROGRESS NOTES
Jerrell Vela is a 63year old male. HPI:     HPI     Here for a VF, OCT and dilated exam. Pt never got the glasses updated from last year. Pt is taking Latanoprost; one drop in both eyes at bedtime as directed.  Pt denies any blurred vision but would l (CRESTOR) 10 MG Oral Tab Take 1 tablet (10 mg total) by mouth nightly.  90 tablet 0   • latanoprost 0.005 % Ophthalmic Solution INSTILL 1 DROP IN BOTH EYES EVERY NIGHT 3 Bottle 3       Allergies:    Calamari Oil [Squid*    ITCHING, SWELLING  Grass Sphere +1.75    Type: does not wear Progressive bifocal          Manifest Refraction       Sphere Cylinder Dist VA Add Near [place]    Right +1.00 Sphere 20/20 +2.25 20/20    Left +1.50 Sphere 20/25 +2.25 20/20          Final Rx       Sphere Cylinder Dist VA Ad

## 2021-03-02 NOTE — ASSESSMENT & PLAN NOTE
Visual field and OCT completed in office today with stable results that were discussed with patient in office today. IOP is stable. Continue Latanoprost at bedtime in both eyes. Will have patient back in 4 months a pressure check.

## 2021-03-02 NOTE — PATIENT INSTRUCTIONS
Pigmentary glaucoma of both eyes, moderate stage  Visual field and OCT completed in office today with stable results that were discussed with patient in office today. IOP is stable. Continue Latanoprost at bedtime in both eyes.    Will have patient medardo

## 2021-03-08 ENCOUNTER — NURSE TRIAGE (OUTPATIENT)
Dept: INTERNAL MEDICINE CLINIC | Facility: CLINIC | Age: 56
End: 2021-03-08

## 2021-03-08 NOTE — TELEPHONE ENCOUNTER
Action Requested: Summary for Provider     []  Critical Lab, Recommendations Needed  [] Need Additional Advice  []   FYI    []   Need Orders  [] Need Medications Sent to Pharmacy  []  Other     SUMMARY:   Patient states he feels like he has food poisoning.

## 2021-03-09 ENCOUNTER — HOSPITAL ENCOUNTER (EMERGENCY)
Facility: HOSPITAL | Age: 56
Discharge: HOME | End: 2021-03-09
Attending: EMERGENCY MEDICINE
Payer: MEDICAID

## 2021-03-09 VITALS
SYSTOLIC BLOOD PRESSURE: 169 MMHG | RESPIRATION RATE: 18 BRPM | HEART RATE: 68 BPM | OXYGEN SATURATION: 96 % | WEIGHT: 196 LBS | DIASTOLIC BLOOD PRESSURE: 96 MMHG | BODY MASS INDEX: 28 KG/M2 | TEMPERATURE: 98 F

## 2021-03-09 DIAGNOSIS — K52.9 GASTROENTERITIS: Primary | ICD-10-CM

## 2021-03-09 LAB
ANION GAP SERPL CALC-SCNC: 4 MMOL/L (ref 0–18)
BUN BLD-MCNC: 9 MG/DL (ref 7–18)
BUN/CREAT SERPL: 10.1 (ref 10–20)
CALCIUM BLD-MCNC: 9.1 MG/DL (ref 8.5–10.1)
CHLORIDE SERPL-SCNC: 106 MMOL/L (ref 98–112)
CO2 SERPL-SCNC: 31 MMOL/L (ref 21–32)
CREAT BLD-MCNC: 0.89 MG/DL
GLUCOSE BLD-MCNC: 95 MG/DL (ref 70–99)
OSMOLALITY SERPL CALC.SUM OF ELEC: 290 MOSM/KG (ref 275–295)
POTASSIUM SERPL-SCNC: 3.6 MMOL/L (ref 3.5–5.1)
SODIUM SERPL-SCNC: 141 MMOL/L (ref 136–145)

## 2021-03-09 PROCEDURE — 85025 COMPLETE CBC W/AUTO DIFF WBC: CPT | Performed by: EMERGENCY MEDICINE

## 2021-03-09 PROCEDURE — 80048 BASIC METABOLIC PNL TOTAL CA: CPT | Performed by: EMERGENCY MEDICINE

## 2021-03-09 PROCEDURE — 96374 THER/PROPH/DIAG INJ IV PUSH: CPT

## 2021-03-09 PROCEDURE — 96361 HYDRATE IV INFUSION ADD-ON: CPT

## 2021-03-09 PROCEDURE — 99284 EMERGENCY DEPT VISIT MOD MDM: CPT

## 2021-03-09 PROCEDURE — 85060 BLOOD SMEAR INTERPRETATION: CPT | Performed by: EMERGENCY MEDICINE

## 2021-03-09 RX ORDER — ONDANSETRON 2 MG/ML
4 INJECTION INTRAMUSCULAR; INTRAVENOUS ONCE
Status: COMPLETED | OUTPATIENT
Start: 2021-03-09 | End: 2021-03-09

## 2021-03-09 RX ORDER — ONDANSETRON 2 MG/ML
INJECTION INTRAMUSCULAR; INTRAVENOUS
Status: COMPLETED
Start: 2021-03-09 | End: 2021-03-09

## 2021-03-09 NOTE — ED PROVIDER NOTES
Patient Seen in: [facility] Emergency Department      History   Patient presents with:  Nausea/Vomiting/Diarrhea  Dehydration    Stated Complaint: Food poisoning    HPI/Subjective:   HPI    The patient is a 28-year-old male who presents with 3 day Systems    Positive for stated complaint: Food poisoning  Other systems are as noted in HPI. Constitutional and vital signs reviewed. All other systems reviewed and negative except as noted above.     Physical Exam     ED Triage Vitals [03/09/21 1609] diverticulitis        ED Course     Labs Reviewed   BASIC METABOLIC PANEL (8) - Normal   CBC W/ DIFFERENTIAL - Normal   SCAN SLIDE   CBC WITH DIFFERENTIAL WITH PLATELET    Narrative:      The following orders were created for panel order CBC WITH DIFFERENTI

## 2021-03-09 NOTE — ED NOTES
Pt to ED referred by PCP for c/o lwoer abd pain, nausea and diarrhea s/p eating [employer] Saturday and again on Sunday. Pt denies vomiting. States he feels better today, dizziness has improved, but was told to be seen in ER by PCP.

## 2021-03-09 NOTE — ED NOTES
Lab diff results over the phone received:    Neutrophil: 56.4%  Lymph: 24.2%  Mono: 7.5%  Eosinophils: 11.1%  Basal: 0.6%  Immature grams: 0.2%

## 2021-03-09 NOTE — TELEPHONE ENCOUNTER
F/u call to pt. Spoke with pt,  verified. Pt stated he didn't go to ER yesterday due to he can't drive. Pt stated he still has the same sx, he is not trying to walk, just laying down and relaxing. Pt been drinking water and gatorade.    Pt stated

## 2021-03-09 NOTE — ED INITIAL ASSESSMENT (HPI)
Notes food poisoning since Saturday from [employer]. Notes diarrhea since Saturday. Last yesterday. Notes feeling dehydrated and headache.   Notes lower abdominal pain

## 2021-03-10 LAB
BASOPHILS # BLD AUTO: 0.06 X10(3) UL (ref 0–0.2)
BASOPHILS NFR BLD AUTO: 0.6 %
DEPRECATED RDW RBC AUTO: 39.6 FL (ref 35.1–46.3)
EOSINOPHIL # BLD AUTO: 1.12 X10(3) UL (ref 0–0.7)
EOSINOPHIL NFR BLD AUTO: 11.1 %
ERYTHROCYTE [DISTWIDTH] IN BLOOD BY AUTOMATED COUNT: 12.3 % (ref 11–15)
HCT VFR BLD AUTO: 46.1 %
HGB BLD-MCNC: 16.1 G/DL
IMM GRANULOCYTES # BLD AUTO: 0.02 X10(3) UL (ref 0–1)
IMM GRANULOCYTES NFR BLD: 0.2 %
LYMPHOCYTES # BLD AUTO: 2.44 X10(3) UL (ref 1–4)
LYMPHOCYTES NFR BLD AUTO: 24.2 %
MCH RBC QN AUTO: 30.4 PG (ref 26–34)
MCHC RBC AUTO-ENTMCNC: 34.9 G/DL (ref 31–37)
MCV RBC AUTO: 87.1 FL
MONOCYTES # BLD AUTO: 0.76 X10(3) UL (ref 0.1–1)
MONOCYTES NFR BLD AUTO: 7.5 %
NEUTROPHILS # BLD AUTO: 5.69 X10 (3) UL (ref 1.5–7.7)
NEUTROPHILS # BLD AUTO: 5.69 X10(3) UL (ref 1.5–7.7)
NEUTROPHILS NFR BLD AUTO: 56.4 %
PLATELET # BLD AUTO: 261 10(3)UL (ref 150–450)
RBC # BLD AUTO: 5.29 X10(6)UL
WBC # BLD AUTO: 10.1 X10(3) UL (ref 4–11)

## 2021-03-11 ENCOUNTER — HOSPITAL ENCOUNTER (EMERGENCY)
Facility: HOSPITAL | Age: 56
Discharge: HOME | End: 2021-03-11
Attending: EMERGENCY MEDICINE
Payer: COMMERCIAL

## 2021-03-11 VITALS
SYSTOLIC BLOOD PRESSURE: 154 MMHG | HEART RATE: 88 BPM | TEMPERATURE: 98 F | WEIGHT: 197 LBS | RESPIRATION RATE: 18 BRPM | DIASTOLIC BLOOD PRESSURE: 93 MMHG | OXYGEN SATURATION: 96 % | HEIGHT: 70 IN | BODY MASS INDEX: 28.2 KG/M2

## 2021-03-11 DIAGNOSIS — S01.85XA DOG BITE OF FACE, INITIAL ENCOUNTER: Primary | ICD-10-CM
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
## 2021-03-11 PROCEDURE — 12011 RPR F/E/E/N/L/M 2.5 CM/<: CPT

## 2021-03-11 PROCEDURE — 90471 IMMUNIZATION ADMIN: CPT

## 2021-03-11 PROCEDURE — 99283 EMERGENCY DEPT VISIT LOW MDM: CPT

## 2021-03-11 RX ORDER — AMOXICILLIN AND CLAVULANATE POTASSIUM 875; 125 MG/1; MG/1
1 TABLET, FILM COATED ORAL 2 TIMES DAILY
Qty: 20 TABLET | Refills: 0 | Status: SHIPPED | OUTPATIENT
Start: 2021-03-11 | End: 2021-03-21

## 2021-03-11 NOTE — ED PROVIDER NOTES
Patient Seen in: [facility] Emergency Department    History   Patient presents with:  Bite      HPI    Patient presents to the ED after being attacked by his friend's dog an hour ago.   He states that the dog ran to the room and jumped up and bit h Smokeless tobacco: Never Used    Vaping Use      Vaping Use: Never used    Substance and Sexual Activity      Alcohol use:  Yes        Alcohol/week: 0.0 standard drinks        Comment:  1-2 3-4 xx  a wk      Drug use: No    Other Topics      Concerns: throughout the duration of the exam.  Handwashing was performed prior to and after the exam.  Stethoscope and any equipment used during my examination was cleaned with super sani-cloth germicidal wipes following the exam.     Physical Exam  Vitals and nurs pertinent problems on file. to contribute to the complexity of this ED evaluation. ED Course: Patient presents to the ED after being attacked by dog sustaining a laceration to his right cheek.   Small amount of tissue loss, and wound gaping significantly

## 2021-03-11 NOTE — ED INITIAL ASSESSMENT (HPI)
Patient here with dog bite to R side of face. Patient states that the dog is UTD on vaccinations. Unknown last tetanus.

## 2021-03-11 NOTE — ED NOTES
Aspesis performed. MD Allie Hyde at bedside. Sutures placed, edges approximated. Bleeding remains controlled. Gauze dressing applied for discharge.

## 2021-03-11 NOTE — ED NOTES
Patient safe to DC home per MD. Derrell Thompson to dress self. DC teaching done, instructions reviewed with patient including when and how to follow up with healthcare providers and when to seek emergency care. The patient verbalizes understanding. Patient ambulatory

## 2021-03-16 ENCOUNTER — OFFICE VISIT (OUTPATIENT)
Dept: INTERNAL MEDICINE CLINIC | Facility: CLINIC | Age: 56
End: 2021-03-16
Payer: MEDICAID

## 2021-03-16 ENCOUNTER — HOSPITAL ENCOUNTER (OUTPATIENT)
Age: 56
Discharge: HOME | End: 2021-03-16
Payer: MEDICAID

## 2021-03-16 VITALS
WEIGHT: 207 LBS | HEIGHT: 70 IN | HEART RATE: 73 BPM | SYSTOLIC BLOOD PRESSURE: 135 MMHG | DIASTOLIC BLOOD PRESSURE: 85 MMHG | BODY MASS INDEX: 29.63 KG/M2

## 2021-03-16 VITALS
DIASTOLIC BLOOD PRESSURE: 82 MMHG | BODY MASS INDEX: 28.35 KG/M2 | TEMPERATURE: 98 F | WEIGHT: 198 LBS | HEART RATE: 84 BPM | OXYGEN SATURATION: 97 % | HEIGHT: 70 IN | RESPIRATION RATE: 18 BRPM | SYSTOLIC BLOOD PRESSURE: 162 MMHG
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
## 2021-03-16 DIAGNOSIS — Z48.02 ENCOUNTER FOR REMOVAL OF SUTURES: Primary | ICD-10-CM

## 2021-03-16 DIAGNOSIS — S01.85XS DOG BITE OF FACE, SEQUELA: ICD-10-CM

## 2021-03-16 DIAGNOSIS — S01.451S: Primary | ICD-10-CM
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
## 2021-03-16 PROCEDURE — 3075F SYST BP GE 130 - 139MM HG: CPT | Performed by: INTERNAL MEDICINE

## 2021-03-16 PROCEDURE — 3079F DIAST BP 80-89 MM HG: CPT | Performed by: INTERNAL MEDICINE

## 2021-03-16 PROCEDURE — 3008F BODY MASS INDEX DOCD: CPT | Performed by: INTERNAL MEDICINE

## 2021-03-16 PROCEDURE — 99024 POSTOP FOLLOW-UP VISIT: CPT | Performed by: EMERGENCY MEDICINE

## 2021-03-16 PROCEDURE — 99213 OFFICE O/P EST LOW 20 MIN: CPT | Performed by: INTERNAL MEDICINE

## 2021-03-16 RX ORDER — TRAMADOL HYDROCHLORIDE 50 MG/1
50 TABLET ORAL EVERY 8 HOURS PRN
Qty: 30 TABLET | Refills: 1 | Status: SHIPPED | OUTPATIENT
Start: 2021-03-16 | End: 2021-04-09 | Stop reason: ALTCHOICE

## 2021-03-16 NOTE — ED PROVIDER NOTES
Patient Seen in: [facility]      History   Patient presents with:  Suture Removal    Stated Complaint: suture removal    HPI/Subjective:   Ernesto Howard is a 63year old  male here for suture removal. No HA, fever, chills, body aches, chest or go to the emergency department for further evaluation of facial pain. No signs of infection; no swelling, erythema, oozing/leaking/crusting from area. Nontender to palpation. He is in no acute distress time, and cleared for discharge home.     Medical

## 2021-03-16 NOTE — ED INITIAL ASSESSMENT (HPI)
Here for a suture removal to right cheek. Wound is clean and dry, some slight redness seen. Pt on antibiotics and has some pain to the area.

## 2021-03-16 NOTE — PROGRESS NOTES
Patient ID: Imani Negron is a 63year old male. Patient presents with:  Urgent Care F/u: Pr report UC follow-up from a dog bite, pt complains of sensitivity on area of dog bite. Pt complains of pain at night, Took Tylenol 600mg with no relief.         H Current Outpatient Medications:   •  bacitracin 500 UNIT/GM External Ointment, Apply 1 Application topically 2 (two) times daily for 10 days. , Disp: 15 g, Rfl: 0  •  traMADol HCl 50 MG Oral Tab, Take 1 tablet (50 mg total) by mouth every 8 (eight) fabiola Outdoor occupation: Not Asked        Pt has a pacemaker: Not Asked        Pt has a defibrillator: Not Asked        Reaction to local anesthetic: No    Social History Narrative      Not on file    Social Determinants of Health  Financial Resource Strain:

## 2021-04-09 ENCOUNTER — OFFICE VISIT (OUTPATIENT)
Dept: INTERNAL MEDICINE CLINIC | Facility: CLINIC | Age: 56
End: 2021-04-09
Payer: MEDICAID

## 2021-04-09 VITALS
DIASTOLIC BLOOD PRESSURE: 79 MMHG | WEIGHT: 200 LBS | HEIGHT: 70 IN | SYSTOLIC BLOOD PRESSURE: 129 MMHG | BODY MASS INDEX: 28.63 KG/M2 | HEART RATE: 98 BPM
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
## 2021-04-09 DIAGNOSIS — E78.00 HYPERCHOLESTEROLEMIA: Primary | ICD-10-CM

## 2021-04-09 DIAGNOSIS — R21 RASH: ICD-10-CM

## 2021-04-09 DIAGNOSIS — S01.451S: ICD-10-CM

## 2021-04-09 PROCEDURE — 3078F DIAST BP <80 MM HG: CPT | Performed by: INTERNAL MEDICINE

## 2021-04-09 PROCEDURE — 3008F BODY MASS INDEX DOCD: CPT | Performed by: INTERNAL MEDICINE

## 2021-04-09 PROCEDURE — 3074F SYST BP LT 130 MM HG: CPT | Performed by: INTERNAL MEDICINE

## 2021-04-09 PROCEDURE — 99214 OFFICE O/P EST MOD 30 MIN: CPT | Performed by: INTERNAL MEDICINE

## 2021-04-09 RX ORDER — ROSUVASTATIN CALCIUM 10 MG/1
10 TABLET, COATED ORAL NIGHTLY
Qty: 90 TABLET | Refills: 0 | Status: CANCELLED | OUTPATIENT
Start: 2021-04-09

## 2021-04-09 NOTE — PROGRESS NOTES
Patient ID: Hair Rivera is a 63year old male. Patient presents with: Follow - Up: Pt reports to office for cholesterol. Derm Problem: Pt concerned about a spot on skin.   Animal Bite: Pt reports dog bite on face, happened 3/11/2021       HISTORY OF Past Surgical History:   Procedure Laterality Date   • COLONOSCOPY N/A 9/15/2020    Performed by Manuel Castle MD at [address] [place] [address] ENDOSCOPY   • ELECTROCARDIOGRAM, COMPLETE  02-    Scanned to media tab          Current Outpatient Medications:   •  Ros occupation: Not Asked        Pt has a pacemaker: Not Asked        Pt has a defibrillator: Not Asked        Reaction to local anesthetic: No    Social History Narrative      Not on file    Social Determinants of Health  Financial Resource Strain:       Diff new prescription statin. This may need to be titrated upwards. 2. Dog bite of right cheek, sequela  · Cosmetically healed with a scar. Explained that this will take time to resolve.   · If no improvement then will need to start pain medications such as

## 2021-04-12 ENCOUNTER — TELEPHONE (OUTPATIENT)
Dept: INTERNAL MEDICINE CLINIC | Facility: CLINIC | Age: 56
End: 2021-04-12

## 2021-04-12 ENCOUNTER — LAB ENCOUNTER (OUTPATIENT)
Dept: LAB | Age: 56
End: 2021-04-12
Attending: INTERNAL MEDICINE
Payer: MEDICAID

## 2021-04-12 DIAGNOSIS — E78.00 HYPERCHOLESTEROLEMIA: ICD-10-CM

## 2021-04-12 PROCEDURE — 80061 LIPID PANEL: CPT

## 2021-04-12 PROCEDURE — 36415 COLL VENOUS BLD VENIPUNCTURE: CPT

## 2021-04-12 RX ORDER — ROSUVASTATIN CALCIUM 10 MG/1
10 TABLET, COATED ORAL NIGHTLY
Qty: 90 TABLET | Refills: 0 | Status: CANCELLED | OUTPATIENT
Start: 2021-04-12

## 2021-04-12 NOTE — TELEPHONE ENCOUNTER
Patient requesting a referral for dermatology. Advised there is a referral on file; telephone number provided; patient transferred to derm.

## 2021-04-12 NOTE — TELEPHONE ENCOUNTER
Pt states he received his lipid panel results. Pt is asking if he will need to continue Crestor, pt states if yes then he needs a refill.

## 2021-04-13 ENCOUNTER — TELEPHONE (OUTPATIENT)
Dept: INTERNAL MEDICINE CLINIC | Facility: CLINIC | Age: 56
End: 2021-04-13

## 2021-04-13 NOTE — TELEPHONE ENCOUNTER
Patient would like to speak with Dr. Bethel Kendrick regarding his test results. Please call [phone] to review test results.

## 2021-04-13 NOTE — TELEPHONE ENCOUNTER
LIPID PANEL: Comments to Patient     Jordan Olivares  Your cholesterol test is attached. Kristina  looks great.  You have responded very well to the medicine.  I will prescribe the same dose for you.     Regards,  Catalina Rao MD  [phone]   Written by Scooter Tucker,

## 2021-04-15 ENCOUNTER — TELEPHONE (OUTPATIENT)
Dept: INTERNAL MEDICINE CLINIC | Facility: CLINIC | Age: 56
End: 2021-04-15

## 2021-04-15 RX ORDER — PANTOPRAZOLE SODIUM 40 MG/1
40 TABLET, DELAYED RELEASE ORAL
Qty: 90 TABLET | Refills: 1 | Status: SHIPPED | OUTPATIENT
Start: 2021-04-15 | End: 2022-01-27

## 2021-04-16 ENCOUNTER — TELEPHONE (OUTPATIENT)
Dept: INTERNAL MEDICINE CLINIC | Facility: CLINIC | Age: 56
End: 2021-04-16

## 2021-04-16 NOTE — TELEPHONE ENCOUNTER
Called pt name date of birth verified informed pt of prescription refill per pt verbalized understanding  Per pt says medication is on hold per insurance not approving no further questions

## 2021-04-22 ENCOUNTER — OFFICE VISIT (OUTPATIENT)
Dept: PULMONOLOGY | Facility: CLINIC | Age: 56
End: 2021-04-22
Payer: MEDICAID

## 2021-04-22 VITALS
RESPIRATION RATE: 18 BRPM | HEIGHT: 70 IN | OXYGEN SATURATION: 97 % | HEART RATE: 72 BPM | WEIGHT: 200.38 LBS | BODY MASS INDEX: 28.69 KG/M2 | TEMPERATURE: 99 F | DIASTOLIC BLOOD PRESSURE: 97 MMHG | SYSTOLIC BLOOD PRESSURE: 140 MMHG

## 2021-04-22 DIAGNOSIS — Z99.89 OSA ON CPAP: Primary | ICD-10-CM

## 2021-04-22 DIAGNOSIS — G47.33 OSA ON CPAP: Primary | ICD-10-CM

## 2021-04-22 PROCEDURE — 3008F BODY MASS INDEX DOCD: CPT | Performed by: INTERNAL MEDICINE

## 2021-04-22 PROCEDURE — 3077F SYST BP >= 140 MM HG: CPT | Performed by: INTERNAL MEDICINE

## 2021-04-22 PROCEDURE — 99213 OFFICE O/P EST LOW 20 MIN: CPT | Performed by: INTERNAL MEDICINE

## 2021-04-22 PROCEDURE — 3080F DIAST BP >= 90 MM HG: CPT | Performed by: INTERNAL MEDICINE

## 2021-04-22 NOTE — PROGRESS NOTES
The patient is a 54-year-old male who Lida from prior evaluation comes in now for follow-up. In general, he is doing better.   Stays baseline respiratory events were 60/h and his auto titrating CPAP from 5-20 shows an average 69 respiratory events per

## 2021-04-28 ENCOUNTER — OFFICE VISIT (OUTPATIENT)
Dept: DERMATOLOGY CLINIC | Facility: CLINIC | Age: 56
End: 2021-04-28
Payer: MEDICAID

## 2021-04-28 DIAGNOSIS — L53.9 ERYTHEMA: ICD-10-CM

## 2021-04-28 DIAGNOSIS — D23.9 BENIGN NEOPLASM OF SKIN, UNSPECIFIED LOCATION: ICD-10-CM

## 2021-04-28 DIAGNOSIS — D22.9 MULTIPLE NEVI: ICD-10-CM

## 2021-04-28 DIAGNOSIS — L82.1 SEBORRHEIC KERATOSES: Primary | ICD-10-CM

## 2021-04-28 PROCEDURE — 99213 OFFICE O/P EST LOW 20 MIN: CPT | Performed by: DERMATOLOGY

## 2021-05-03 NOTE — PROGRESS NOTES
Janey Millan is a 63year old male. HPI:     CC:  Patient presents with:  Lesion: LOV 3/16/2020 Patient present with red flat lesion on L upper chest .Patient c/o having lesion for 2 months . Patient denies lesions changing color shape or size.  Patient h IN BOTH EYES EVERY NIGHT 3 Bottle 3   • Pantoprazole Sodium 40 MG Oral Tab EC Take 1 tablet (40 mg total) by mouth every morning before breakfast. (Patient not taking: Reported on 4/28/2021 ) 90 tablet 1     Allergies:     Calamari Oil [Squid*    ITCHING, Transfusions: Not Asked        Caffeine Concern: Yes          Daily; coffee, 1 cup         Occupational Exposure: Not Asked        Hobby Hazards: Not Asked        Sleep Concern: Not Asked        Stress Concern: Not Asked        Weight Concern: Not Asked denies lesions changing color shape or size. Patient has AK     Patient with erythematous lesion has been coming and going at upper chest.  Was present at the time when he had been seen by his PCP. New area previously treated with cryo AK by EVELYN.   Has rec Visit:  Requested Prescriptions      No prescriptions requested or ordered in this encounter         Seborrheic keratoses  (primary encounter diagnosis)  Benign neoplasm of skin, unspecified location  Multiple nevi  Erythema    See details on map.       Rem various therapies, risks benefits discussed. Pathophysiology discussed with patient. Therapeutic options reviewed. See  Medications in grid. Instructions reviewed at length.     Benign nevi, seborrheic  keratoses, cherry angiomas:  Reassurance regarding o

## 2021-07-07 ENCOUNTER — OFFICE VISIT (OUTPATIENT)
Dept: OPHTHALMOLOGY | Facility: CLINIC | Age: 56
End: 2021-07-07
Payer: MEDICAID

## 2021-07-07 DIAGNOSIS — H40.1332 PIGMENTARY GLAUCOMA OF BOTH EYES, MODERATE STAGE: Primary | ICD-10-CM

## 2021-07-07 PROCEDURE — 99213 OFFICE O/P EST LOW 20 MIN: CPT | Performed by: OPHTHALMOLOGY

## 2021-07-07 NOTE — ASSESSMENT & PLAN NOTE
IOP is stable. Continue Latanoprost at bedtime in both eyes. Will have patient back in 4 months a pressure check.

## 2021-07-07 NOTE — PROGRESS NOTES
Hair Rivera is a 61year old male. HPI:     HPI     Pt is here for an IOP check. Pt is taking Latanoprost both eyes at bedtime as directed. Pt states vision is stable, he did not have new glasses made with Rx from last visit.      Last edited by Dina Garcia Rosuvastatin Calcium (CRESTOR) 10 MG Oral Tab Take 1 tablet (10 mg total) by mouth nightly.  90 tablet 3   • latanoprost 0.005 % Ophthalmic Solution INSTILL 1 DROP IN BOTH EYES EVERY NIGHT 3 Bottle 3       Allergies:    Calamari Oil [Squid*    ITCHING, SWEL

## 2021-07-13 ENCOUNTER — OFFICE VISIT (OUTPATIENT)
Dept: INTERNAL MEDICINE CLINIC | Facility: CLINIC | Age: 56
End: 2021-07-13
Payer: MEDICAID

## 2021-07-13 VITALS
HEIGHT: 70 IN | WEIGHT: 189.38 LBS | DIASTOLIC BLOOD PRESSURE: 84 MMHG | SYSTOLIC BLOOD PRESSURE: 143 MMHG | BODY MASS INDEX: 27.11 KG/M2 | HEART RATE: 79 BPM

## 2021-07-13 DIAGNOSIS — Z99.89 OSA ON CPAP: ICD-10-CM

## 2021-07-13 DIAGNOSIS — G47.33 OSA ON CPAP: ICD-10-CM

## 2021-07-13 DIAGNOSIS — I10 PRIMARY HYPERTENSION: ICD-10-CM

## 2021-07-13 DIAGNOSIS — M19.041 PRIMARY OSTEOARTHRITIS OF BOTH HANDS: Primary | ICD-10-CM

## 2021-07-13 DIAGNOSIS — M19.042 PRIMARY OSTEOARTHRITIS OF BOTH HANDS: Primary | ICD-10-CM

## 2021-07-13 PROCEDURE — 99214 OFFICE O/P EST MOD 30 MIN: CPT | Performed by: INTERNAL MEDICINE

## 2021-07-13 PROCEDURE — 3077F SYST BP >= 140 MM HG: CPT | Performed by: INTERNAL MEDICINE

## 2021-07-13 PROCEDURE — 3008F BODY MASS INDEX DOCD: CPT | Performed by: INTERNAL MEDICINE

## 2021-07-13 PROCEDURE — 3079F DIAST BP 80-89 MM HG: CPT | Performed by: INTERNAL MEDICINE

## 2021-07-13 NOTE — PROGRESS NOTES
Patient ID: Fabi Wells is a 61year old male. Patient presents with: Follow - Up: 3 month f/u       HISTORY OF PRESENT ILLNESS:   HPI  Patient presents for above. Here for multiple issues. Having pain in his hands in the morning.   Needs to do st • COLONOSCOPY N/A 9/15/2020    Procedure: COLONOSCOPY;  Surgeon: Amelie Mason MD;  Location: [address] [place] [address] ENDOSCOPY   • ELECTROCARDIOGRAM, COMPLETE  02-    Scanned to media tab          Current Outpatient Medications:   •  Pantoprazole Sodium 40 M Pt has a defibrillator: Not Asked        Reaction to local anesthetic: No    Social History Narrative      Not on file    Social Determinants of Health  Financial Resource Strain:       Difficulty of Paying Living Expenses:   Food Insecurity:       Worried medications to help with pain. · Explained that this is not reversible but can be slowed down with decrease in repetitive motions and heavy lifting. · Take glucosamine/chondroitin over-the-counter.     2. MANAN on CPAP  · Continue CPAP with present settings

## 2021-07-18 ENCOUNTER — HOSPITAL ENCOUNTER (EMERGENCY)
Facility: HOSPITAL | Age: 56
Discharge: HOME | End: 2021-07-19
Payer: MEDICAID

## 2021-07-18 ENCOUNTER — APPOINTMENT (OUTPATIENT)
Dept: GENERAL RADIOLOGY | Facility: HOSPITAL | Age: 56
End: 2021-07-18
Payer: MEDICAID

## 2021-07-18 DIAGNOSIS — S69.92XA INJURY OF LEFT THUMB, INITIAL ENCOUNTER: Primary | ICD-10-CM

## 2021-07-18 PROCEDURE — 99283 EMERGENCY DEPT VISIT LOW MDM: CPT

## 2021-07-18 PROCEDURE — 73130 X-RAY EXAM OF HAND: CPT

## 2021-07-18 RX ORDER — TRAMADOL HYDROCHLORIDE 50 MG/1
50 TABLET ORAL ONCE
Status: COMPLETED | OUTPATIENT
Start: 2021-07-18 | End: 2021-07-18

## 2021-07-19 VITALS
DIASTOLIC BLOOD PRESSURE: 70 MMHG | BODY MASS INDEX: 27.51 KG/M2 | TEMPERATURE: 98 F | WEIGHT: 190 LBS | RESPIRATION RATE: 18 BRPM | HEART RATE: 82 BPM | SYSTOLIC BLOOD PRESSURE: 140 MMHG | HEIGHT: 69.5 IN | OXYGEN SATURATION: 96 %

## 2021-07-19 RX ORDER — TRAMADOL HYDROCHLORIDE 50 MG/1
TABLET ORAL EVERY 6 HOURS PRN
Qty: 10 TABLET | Refills: 0 | Status: SHIPPED | OUTPATIENT
Start: 2021-07-19 | End: 2021-07-26
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
## 2021-07-19 NOTE — ED PROVIDER NOTES
Patient Seen in: [facility] Emergency Department      History   Patient presents with:  Trauma    Stated Complaint: Fall/ hand injury    HPI/Subjective:   55yo/m with hx of GERD, DM, MANAN, Lumbar radiculopathy reports with left thumb pain.  dakota Spencer Fall/ hand injury  Other systems are as noted in HPI. Constitutional and vital signs reviewed. All other systems reviewed and negative except as noted above.     Physical Exam     ED Triage Vitals [07/18/21 2135]   /83   Pulse 80   Resp 18   Tem stated complaints of left thumb injury    Sharp pain  No snuff box tenderness  No deformity  Xray did not demonstrate acute pathology  Soft compartments  Brisk cap refil      Applied thumb spica immobilizer    Plan; close fu with hand  Pain control    Coun

## 2021-10-28 ENCOUNTER — OFFICE VISIT (OUTPATIENT)
Dept: PULMONOLOGY | Facility: CLINIC | Age: 56
End: 2021-10-28
Payer: MEDICAID

## 2021-10-28 ENCOUNTER — TELEPHONE (OUTPATIENT)
Dept: OPHTHALMOLOGY | Facility: CLINIC | Age: 56
End: 2021-10-28

## 2021-10-28 VITALS
BODY MASS INDEX: 28.92 KG/M2 | HEIGHT: 70 IN | DIASTOLIC BLOOD PRESSURE: 84 MMHG | HEART RATE: 69 BPM | WEIGHT: 202 LBS | SYSTOLIC BLOOD PRESSURE: 133 MMHG | OXYGEN SATURATION: 96 %

## 2021-10-28 DIAGNOSIS — G47.33 OSA ON CPAP: Primary | ICD-10-CM

## 2021-10-28 DIAGNOSIS — Z99.89 OSA ON CPAP: Primary | ICD-10-CM

## 2021-10-28 PROCEDURE — 3075F SYST BP GE 130 - 139MM HG: CPT | Performed by: INTERNAL MEDICINE

## 2021-10-28 PROCEDURE — 3008F BODY MASS INDEX DOCD: CPT | Performed by: INTERNAL MEDICINE

## 2021-10-28 PROCEDURE — 99213 OFFICE O/P EST LOW 20 MIN: CPT | Performed by: INTERNAL MEDICINE

## 2021-10-28 PROCEDURE — 3079F DIAST BP 80-89 MM HG: CPT | Performed by: INTERNAL MEDICINE

## 2021-10-28 RX ORDER — LATANOPROST 50 UG/ML
SOLUTION/ DROPS OPHTHALMIC
Qty: 7.5 ML | Refills: 3 | Status: SHIPPED | OUTPATIENT
Start: 2021-10-28

## 2021-10-28 NOTE — TELEPHONE ENCOUNTER
Pt is scheduled for his IOP check on 11/9/21. Rx pended to [facility] for Latanoprost- pt states that he is running out of his drops prior to his next appointment. He will be here on 11/9/21.

## 2021-10-28 NOTE — TELEPHONE ENCOUNTER
Pt called stating pt has an appointment on 11-9-21. Pt wants to speak to the nurse right now to discuss pt will be running out of medication. Advised pt to call the pharmacy to request a refill and pharmacy send refill request to the office.   Pt stated h

## 2021-10-28 NOTE — PROGRESS NOTES
The patient is a 48-year-old male who is a great success story for sleep apnea.   He has substantial sleep apnea and does much better on BiPAP auto titrating 7 to 20 cm water pressure and his download is excellent with 9 hours and 7 minutes average daily us

## 2021-11-09 ENCOUNTER — OFFICE VISIT (OUTPATIENT)
Dept: OPHTHALMOLOGY | Facility: CLINIC | Age: 56
End: 2021-11-09
Payer: MEDICAID

## 2021-11-09 DIAGNOSIS — H40.1332 PIGMENTARY GLAUCOMA OF BOTH EYES, MODERATE STAGE: Primary | ICD-10-CM

## 2021-11-09 PROCEDURE — 99213 OFFICE O/P EST LOW 20 MIN: CPT | Performed by: OPHTHALMOLOGY

## 2021-11-09 NOTE — PATIENT INSTRUCTIONS
Pigmentary glaucoma of both eyes, moderate stage  IOP is stable. Continue Latanoprost at bedtime in both eyes. Will have patient back in 4 months a visual field, OCT, dilated eye exam and photos.

## 2021-11-09 NOTE — PROGRESS NOTES
J Luis Griffin is a 61year old male. HPI:     HPI     Patient is here for an IOP check. Pt is taking Latanoprost both eyes at bedtime as directed. Patient is wearing OTC reading glasses for distance and near.   He is happy with his vision and rarely we DROP IN BOTH EYES EVERY NIGHT 7.5 mL 3   • Pantoprazole Sodium 40 MG Oral Tab EC Take 1 tablet (40 mg total) by mouth every morning before breakfast. 90 tablet 1   • Rosuvastatin Calcium (CRESTOR) 10 MG Oral Tab Take 1 tablet (10 mg total) by mouth nightly Eyes      Meds This Visit:  Requested Prescriptions      No prescriptions requested or ordered in this encounter        Follow up instructions:  Return in about 4 months (around 3/9/2022) for Visual field, OCT, Complete eye exam, Photos.     11/9/2021  Scri

## 2021-11-09 NOTE — ASSESSMENT & PLAN NOTE
IOP is stable. Continue Latanoprost at bedtime in both eyes. Will have patient back in 4 months a visual field, OCT, dilated eye exam and photos.

## 2022-01-27 RX ORDER — PANTOPRAZOLE SODIUM 40 MG/1
40 TABLET, DELAYED RELEASE ORAL
Qty: 90 TABLET | Refills: 1 | Status: SHIPPED | OUTPATIENT
Start: 2022-01-27 | End: 2022-07-20 | Stop reason: ALTCHOICE

## 2022-01-27 NOTE — TELEPHONE ENCOUNTER
Refill passed per [address] [facility] protocol.      Requested Prescriptions   Pending Prescriptions Disp Refills    PANTOPRAZOLE 40 MG Oral Tab EC [Pharmacy Med Name: PANTOPRAZOLE 40MG TABLETS] 90 tablet 1     Sig: TAKE 1 TABLET(40 MG) BY MOUTH EVERY MORNING BEFORE BREAKFAST        Gastrointestional Medication Protocol Passed - 1/27/2022  3:28 AM        Passed - Appointment in past 12 or next 3 months                Recent Outpatient Visits              2 months ago Pigmentary glaucoma of both eyes, moderate stage    [facility] Herb Rai MD    Office Visit    3 months ago MANAN on CPAP    [facility]athia Prajapati, [address] [place], [place] [address]joyanhjose g VALLES, Rosa Maria Correa MD    Office Visit    6 months ago Primary osteoarthritis of both hands    [facility], [facility] Earlene Chong, Kaylynn Gan MD    Office Visit    6 months ago Pigmentary glaucoma of both eyes, moderate stage    [facility] Herb Rai MD    Office Visit    9 months ago [address] Dermatology Nicole Cutler MD    Office Visit             Future Appointments         Provider Department Appt Notes    In 1 month Herb Rai MD [facility] EP/VF, OCT EE and photos    In 9 months Rosa Maria Low MD Araceli Schmid, [address] [place] [address]

## 2022-02-21 RX ORDER — LATANOPROST 50 UG/ML
SOLUTION/ DROPS OPHTHALMIC
Qty: 7.5 ML | Refills: 3 | Status: SHIPPED | OUTPATIENT
Start: 2022-02-21

## 2022-02-21 NOTE — TELEPHONE ENCOUNTER
Rx sent to [facility]- Latanoprost; one drop in each eye at bedtime    Pt is scheduled on 3/22/22 for VF, OCT , EE and Photos.

## 2022-03-22 ENCOUNTER — OFFICE VISIT (OUTPATIENT)
Dept: OPHTHALMOLOGY | Facility: CLINIC | Age: 57
End: 2022-03-22
Payer: MEDICAID

## 2022-03-22 DIAGNOSIS — H02.886 MEIBOMIAN GLAND DYSFUNCTION (MGD) OF BOTH EYES: ICD-10-CM

## 2022-03-22 DIAGNOSIS — H43.393 VITREOUS FLOATERS OF BOTH EYES: ICD-10-CM

## 2022-03-22 DIAGNOSIS — H40.1332 PIGMENTARY GLAUCOMA OF BOTH EYES, MODERATE STAGE: Primary | ICD-10-CM

## 2022-03-22 DIAGNOSIS — H25.13 AGE-RELATED NUCLEAR CATARACT OF BOTH EYES: ICD-10-CM

## 2022-03-22 DIAGNOSIS — H02.883 MEIBOMIAN GLAND DYSFUNCTION (MGD) OF BOTH EYES: ICD-10-CM

## 2022-03-22 PROCEDURE — 92250 FUNDUS PHOTOGRAPHY W/I&R: CPT | Performed by: OPHTHALMOLOGY

## 2022-03-22 PROCEDURE — 92083 EXTENDED VISUAL FIELD XM: CPT | Performed by: OPHTHALMOLOGY

## 2022-03-22 PROCEDURE — 92133 CPTRZD OPH DX IMG PST SGM ON: CPT | Performed by: OPHTHALMOLOGY

## 2022-03-22 PROCEDURE — 92014 COMPRE OPH EXAM EST PT 1/>: CPT | Performed by: OPHTHALMOLOGY

## 2022-03-22 PROCEDURE — 92015 DETERMINE REFRACTIVE STATE: CPT | Performed by: OPHTHALMOLOGY

## 2022-03-22 NOTE — ASSESSMENT & PLAN NOTE
Visual field and OCT completed in office today with stable results that were discussed with patient in office today. Photos were taken today to document optic nerves. IOP is stable. Continue Latanoprost at bedtime in both eyes. Will have patient back in 4 months a pressure check.

## 2022-03-22 NOTE — ASSESSMENT & PLAN NOTE
Patient was instructed to use warm compresses to the eyelids twice a day everyday. Instructions for warm compress use:   Patient should place wash compresses on both eyelids for 5 minutes every morning and every night. After 5 minutes of holding the warm compresses on the eyelids, patient should gently rub the eyelashes and then rinse thoroughly with warm water. Diagnosis discussed with patient. Use artificial tears (any over the counter brand is okay) up to 4 times per day as needed for dry eye symptoms.

## 2022-03-22 NOTE — ASSESSMENT & PLAN NOTE
Discussed moderate cataracts in both eyes that are not affecting vision and are not surgical at this time. Suggest +1.50 for distance and +2.75 for reading.

## 2022-03-22 NOTE — PATIENT INSTRUCTIONS
Pigmentary glaucoma of both eyes, moderate stage  Visual field and OCT completed in office today with stable results that were discussed with patient in office today. Photos were taken today to document optic nerves. IOP is stable. Continue Latanoprost at bedtime in both eyes. Will have patient back in 4 months a pressure check. Age-related nuclear cataract of both eyes  Discussed moderate cataracts in both eyes that are not affecting vision and are not surgical at this time. Suggest +1.50 for distance and +2.75 for reading. Vitreous floaters of both eyes  No treatment. Meibomian gland dysfunction (MGD) of both eyes  Patient was instructed to use warm compresses to the eyelids twice a day everyday. Instructions for warm compress use:   Patient should place wash compresses on both eyelids for 5 minutes every morning and every night. After 5 minutes of holding the warm compresses on the eyelids, patient should gently rub the eyelashes and then rinse thoroughly with warm water. Diagnosis discussed with patient. Use artificial tears (any over the counter brand is okay) up to 4 times per day as needed for dry eye symptoms.

## 2022-04-06 RX ORDER — ROSUVASTATIN CALCIUM 10 MG/1
10 TABLET, COATED ORAL NIGHTLY
Qty: 90 TABLET | Refills: 1 | Status: SHIPPED | OUTPATIENT
Start: 2022-04-06

## 2022-04-06 NOTE — TELEPHONE ENCOUNTER
Please review. Protocol failed/ No protocol      Requested Prescriptions   Pending Prescriptions Disp Refills    ROSUVASTATIN 10 MG Oral Tab [Pharmacy Med Name: ROSUVASTATIN 10MG TABLETS] 90 tablet 3     Sig: TAKE 1 TABLET(10 MG) BY MOUTH EVERY NIGHT        Cholesterol Medication Protocol Failed - 4/6/2022  3:28 AM        Failed - ALT in past 12 months        Failed - Last ALT < 80       Lab Results   Component Value Date    ALT 52 01/05/2021             Passed - LDL in past 12 months        Passed - Last LDL < 130     Lab Results   Component Value Date    LDL 92 04/12/2021               Passed - Appointment in past 12 or next 3 months              Future Appointments         Provider Department Appt Notes    In 3 months Rita Puga MD [facility] EP/ 4 mos IOP     In 7 months Marcella Mishra MD [place], [address] [place] [address] yearly             Recent Outpatient Visits              2 weeks ago Pigmentary glaucoma of both eyes, moderate stage    [facility] Rita Puga MD    Office Visit    4 months ago Pigmentary glaucoma of both eyes, moderate stage    [facility] Rita Puga MD    Office Visit    5 months ago MANAN on CPAP    Terrell Daniels MD    Office Visit    8 months ago Primary osteoarthritis of both hands    [facility] Bella, Gopal Gama MD    Office Visit    9 months ago Pigmentary glaucoma of both eyes, moderate stage    [facility] Rita Puga MD    Office Visit

## 2022-04-25 ENCOUNTER — HOSPITAL ENCOUNTER (EMERGENCY)
Facility: HOSPITAL | Age: 57
Discharge: STILL ADMITTED OR ADMINISTRATIVE | End: 2022-04-25
Payer: MEDICAID

## 2022-05-04 ENCOUNTER — TELEPHONE (OUTPATIENT)
Dept: PULMONOLOGY | Facility: CLINIC | Age: 57
End: 2022-05-04

## 2022-05-04 NOTE — TELEPHONE ENCOUNTER
Patient is calling to request portable CPAP machine. [place] please call to confirm which machine he needs to be able to travel.

## 2022-05-05 ENCOUNTER — MED REC SCAN ONLY (OUTPATIENT)
Dept: INTERNAL MEDICINE CLINIC | Facility: CLINIC | Age: 57
End: 2022-05-05

## 2022-05-05 NOTE — TELEPHONE ENCOUNTER
Spoke with patient let him know that most portable CPAP machines are not covered by insurance. Pt says he checked with his insurance and they said he qualified. Ok to order?

## 2022-05-06 NOTE — TELEPHONE ENCOUNTER
Spoke with patient confirmed that his current BiPAP pressure is 22/8 and he is ordering the travel/portable BIPAP from [facility]. Spoke with [facility] at [facility] states their travel machines are on backorder. Spoke with patient, informed him that [facility] does not have any travel machines in stock and that he will need to find a DME that has them in stock and works with his insurance, RN suggested . Patient was very irritated and suggested RN shop for device for him, stating he does not have time to shop for his travel BiPAP machine. Requesting to speak with Dr. Pantera Capps and disconnected the call. Dr. Pantera Capps - Patient requesting to speak directly with you.

## 2022-05-11 ENCOUNTER — TELEPHONE (OUTPATIENT)
Dept: INTERNAL MEDICINE CLINIC | Facility: CLINIC | Age: 57
End: 2022-05-11

## 2022-05-11 ENCOUNTER — OFFICE VISIT (OUTPATIENT)
Dept: INTERNAL MEDICINE CLINIC | Facility: CLINIC | Age: 57
End: 2022-05-11
Payer: MEDICAID

## 2022-05-11 VITALS
HEIGHT: 70 IN | TEMPERATURE: 97 F | WEIGHT: 206 LBS | HEART RATE: 84 BPM | SYSTOLIC BLOOD PRESSURE: 117 MMHG | BODY MASS INDEX: 29.49 KG/M2 | DIASTOLIC BLOOD PRESSURE: 75 MMHG

## 2022-05-11 DIAGNOSIS — M25.562 LEFT KNEE PAIN, UNSPECIFIED CHRONICITY: ICD-10-CM

## 2022-05-11 DIAGNOSIS — S71.151A DOG BITE OF THIGH WITHOUT COMPLICATION, RIGHT, INITIAL ENCOUNTER: Primary | ICD-10-CM
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
## 2022-05-11 PROCEDURE — 99213 OFFICE O/P EST LOW 20 MIN: CPT | Performed by: INTERNAL MEDICINE

## 2022-05-11 PROCEDURE — 3074F SYST BP LT 130 MM HG: CPT | Performed by: INTERNAL MEDICINE

## 2022-05-11 PROCEDURE — 3078F DIAST BP <80 MM HG: CPT | Performed by: INTERNAL MEDICINE

## 2022-05-11 PROCEDURE — 3008F BODY MASS INDEX DOCD: CPT | Performed by: INTERNAL MEDICINE

## 2022-05-11 RX ORDER — AMOXICILLIN AND CLAVULANATE POTASSIUM 875; 125 MG/1; MG/1
1 TABLET, FILM COATED ORAL 2 TIMES DAILY
Qty: 20 TABLET | Refills: 0 | Status: SHIPPED | OUTPATIENT
Start: 2022-05-11

## 2022-05-11 NOTE — TELEPHONE ENCOUNTER
Patient states he was bitten by a dog on Saturday in [place]. States a woman was walking 5 dogs, and when the dogs were passing him, that's is when a medium sized dog bit him behind the right knee.   States it is hurting, but not giving much more information as he desired to be seen by an MD.    Scheduled with Dr. Brandt Erickson today at 2:00 pm.

## 2022-05-20 ENCOUNTER — TELEMEDICINE (OUTPATIENT)
Dept: INTERNAL MEDICINE CLINIC | Facility: CLINIC | Age: 57
End: 2022-05-20

## 2022-05-20 DIAGNOSIS — R11.0 NAUSEA: ICD-10-CM

## 2022-05-20 DIAGNOSIS — J02.9 SORE THROAT: Primary | ICD-10-CM

## 2022-05-20 PROCEDURE — 99213 OFFICE O/P EST LOW 20 MIN: CPT | Performed by: NURSE PRACTITIONER

## 2022-05-20 RX ORDER — ONDANSETRON 4 MG/1
4 TABLET, ORALLY DISINTEGRATING ORAL EVERY 8 HOURS PRN
Qty: 10 TABLET | Refills: 0 | Status: SHIPPED | OUTPATIENT
Start: 2022-05-20

## 2022-05-20 RX ORDER — AZITHROMYCIN 250 MG/1
TABLET, FILM COATED ORAL
Qty: 6 TABLET | Refills: 0 | Status: SHIPPED | OUTPATIENT
Start: 2022-05-20 | End: 2022-05-25

## 2022-06-14 ENCOUNTER — HOSPITAL ENCOUNTER (OUTPATIENT)
Dept: GENERAL RADIOLOGY | Facility: HOSPITAL | Age: 57
Discharge: HOME | End: 2022-06-14
Attending: ORTHOPAEDIC SURGERY
Payer: MEDICAID

## 2022-06-14 ENCOUNTER — OFFICE VISIT (OUTPATIENT)
Dept: ORTHOPEDICS CLINIC | Facility: CLINIC | Age: 57
End: 2022-06-14
Payer: MEDICAID

## 2022-06-14 VITALS — HEART RATE: 74 BPM | SYSTOLIC BLOOD PRESSURE: 152 MMHG | DIASTOLIC BLOOD PRESSURE: 90 MMHG

## 2022-06-14 DIAGNOSIS — R52 PAIN: ICD-10-CM

## 2022-06-14 DIAGNOSIS — M65.842 STENOSING TENOSYNOVITIS OF FINGER OF LEFT HAND: ICD-10-CM

## 2022-06-14 DIAGNOSIS — M23.92 INTERNAL DERANGEMENT OF LEFT KNEE: Primary | ICD-10-CM

## 2022-06-14 PROCEDURE — 20550 NJX 1 TENDON SHEATH/LIGAMENT: CPT | Performed by: ORTHOPAEDIC SURGERY

## 2022-06-14 PROCEDURE — 3080F DIAST BP >= 90 MM HG: CPT | Performed by: ORTHOPAEDIC SURGERY

## 2022-06-14 PROCEDURE — 3077F SYST BP >= 140 MM HG: CPT | Performed by: ORTHOPAEDIC SURGERY

## 2022-06-14 PROCEDURE — 73564 X-RAY EXAM KNEE 4 OR MORE: CPT | Performed by: ORTHOPAEDIC SURGERY

## 2022-06-14 PROCEDURE — 99244 OFF/OP CNSLTJ NEW/EST MOD 40: CPT | Performed by: ORTHOPAEDIC SURGERY

## 2022-06-14 RX ORDER — TRIAMCINOLONE ACETONIDE 40 MG/ML
20 INJECTION, SUSPENSION INTRA-ARTICULAR; INTRAMUSCULAR ONCE
Status: COMPLETED | OUTPATIENT
Start: 2022-06-14 | End: 2022-06-14

## 2022-06-14 RX ADMIN — TRIAMCINOLONE ACETONIDE 20 MG: 40 INJECTION, SUSPENSION INTRA-ARTICULAR; INTRAMUSCULAR at 15:49:00

## 2022-06-14 NOTE — PROGRESS NOTES
Per verbal order from Dr. Rere Gan, draw up 0.5ml of 0.5% Marcaine and 0.5ml of Kenalog 40 for cortisone injection to Left middle finger [place]

## 2022-06-23 ENCOUNTER — TELEPHONE (OUTPATIENT)
Dept: ORTHOPEDICS CLINIC | Facility: CLINIC | Age: 57
End: 2022-06-23

## 2022-06-23 NOTE — TELEPHONE ENCOUNTER
Patient spoke with his insurance regarding prior authorization for his MRI. Patient was informed that our office would need to request a peer to peer. Please call at [phone],PDYTCH.   *Patient also indicates he will not be able to do his physical therapy until he returns from [facility] in the end of August.

## 2022-06-23 NOTE — TELEPHONE ENCOUNTER
I was able to pull up the denial letter. Since the denial was issued on 6/15/2022, peer to peer is unfortunately no longer available. In order to file an appeal the patient will need to come in and sign an authorized designee form for [address] [employer]. Here is the denial reasoning for reference.

## 2022-07-19 ENCOUNTER — OFFICE VISIT (OUTPATIENT)
Dept: OPHTHALMOLOGY | Facility: CLINIC | Age: 57
End: 2022-07-19
Payer: MEDICAID

## 2022-07-19 DIAGNOSIS — H40.1332 PIGMENTARY GLAUCOMA OF BOTH EYES, MODERATE STAGE: Primary | ICD-10-CM

## 2022-07-19 PROCEDURE — 99213 OFFICE O/P EST LOW 20 MIN: CPT | Performed by: OPHTHALMOLOGY

## 2022-07-19 NOTE — PATIENT INSTRUCTIONS
Pigmentary glaucoma of both eyes, moderate stage  IOP is stable. Continue Latanoprost at bedtime in both eyes. We will have patient back in 4 months for an IOP check.

## 2022-07-19 NOTE — ASSESSMENT & PLAN NOTE
IOP is stable. Continue Latanoprost at bedtime in both eyes. We will have patient back in 4 months for an IOP check.

## 2022-07-20 ENCOUNTER — LAB ENCOUNTER (OUTPATIENT)
Dept: LAB | Age: 57
End: 2022-07-20
Attending: INTERNAL MEDICINE
Payer: MEDICAID

## 2022-07-20 ENCOUNTER — OFFICE VISIT (OUTPATIENT)
Dept: INTERNAL MEDICINE CLINIC | Facility: CLINIC | Age: 57
End: 2022-07-20
Payer: MEDICAID

## 2022-07-20 ENCOUNTER — TELEPHONE (OUTPATIENT)
Dept: INTERNAL MEDICINE CLINIC | Facility: CLINIC | Age: 57
End: 2022-07-20

## 2022-07-20 VITALS
DIASTOLIC BLOOD PRESSURE: 79 MMHG | BODY MASS INDEX: 29.06 KG/M2 | WEIGHT: 203 LBS | HEART RATE: 67 BPM | SYSTOLIC BLOOD PRESSURE: 138 MMHG | HEIGHT: 70 IN

## 2022-07-20 DIAGNOSIS — Z00.00 ROUTINE GENERAL MEDICAL EXAMINATION AT A HEALTH CARE FACILITY: ICD-10-CM

## 2022-07-20 DIAGNOSIS — G47.33 OSA ON CPAP: ICD-10-CM

## 2022-07-20 DIAGNOSIS — I10 PRIMARY HYPERTENSION: ICD-10-CM

## 2022-07-20 DIAGNOSIS — Z99.89 OSA ON CPAP: ICD-10-CM

## 2022-07-20 DIAGNOSIS — Z12.11 COLON CANCER SCREENING: ICD-10-CM

## 2022-07-20 DIAGNOSIS — Z00.00 ANNUAL PHYSICAL EXAM: Primary | ICD-10-CM

## 2022-07-20 DIAGNOSIS — E78.00 HYPERCHOLESTEROLEMIA: ICD-10-CM

## 2022-07-20 DIAGNOSIS — R73.01 IMPAIRED FASTING GLUCOSE: ICD-10-CM

## 2022-07-20 DIAGNOSIS — E78.00 PURE HYPERCHOLESTEROLEMIA: Primary | ICD-10-CM

## 2022-07-20 LAB
ALBUMIN SERPL-MCNC: 3.8 G/DL (ref 3.4–5)
ALBUMIN/GLOB SERPL: 1.1 {RATIO} (ref 1–2)
ALP LIVER SERPL-CCNC: 72 U/L
ALT SERPL-CCNC: 31 U/L
ANION GAP SERPL CALC-SCNC: 8 MMOL/L (ref 0–18)
AST SERPL-CCNC: 20 U/L (ref 15–37)
BASOPHILS # BLD AUTO: 0.07 X10(3) UL (ref 0–0.2)
BASOPHILS NFR BLD AUTO: 0.9 %
BILIRUB SERPL-MCNC: 0.2 MG/DL (ref 0.1–2)
BUN BLD-MCNC: 15 MG/DL (ref 7–18)
BUN/CREAT SERPL: 15.6 (ref 10–20)
CALCIUM BLD-MCNC: 9.1 MG/DL (ref 8.5–10.1)
CHLORIDE SERPL-SCNC: 105 MMOL/L (ref 98–112)
CHOLEST SERPL-MCNC: 140 MG/DL (ref ?–200)
CO2 SERPL-SCNC: 26 MMOL/L (ref 21–32)
CREAT BLD-MCNC: 0.96 MG/DL
CREAT UR-SCNC: 217 MG/DL
DEPRECATED RDW RBC AUTO: 40.3 FL (ref 35.1–46.3)
EOSINOPHIL # BLD AUTO: 0.95 X10(3) UL (ref 0–0.7)
EOSINOPHIL NFR BLD AUTO: 11.9 %
ERYTHROCYTE [DISTWIDTH] IN BLOOD BY AUTOMATED COUNT: 11.9 % (ref 11–15)
EST. AVERAGE GLUCOSE BLD GHB EST-MCNC: 117 MG/DL (ref 68–126)
FASTING PATIENT LIPID ANSWER: YES
FASTING STATUS PATIENT QL REPORTED: YES
GLOBULIN PLAS-MCNC: 3.4 G/DL (ref 2.8–4.4)
GLUCOSE BLD-MCNC: 122 MG/DL (ref 70–99)
HBA1C MFR BLD: 5.7 % (ref ?–5.7)
HCT VFR BLD AUTO: 46.4 %
HDLC SERPL-MCNC: 19 MG/DL (ref 40–59)
HGB BLD-MCNC: 15.3 G/DL
IMM GRANULOCYTES # BLD AUTO: 0.02 X10(3) UL (ref 0–1)
IMM GRANULOCYTES NFR BLD: 0.2 %
LDLC SERPL CALC-MCNC: 53 MG/DL (ref ?–100)
LYMPHOCYTES # BLD AUTO: 2.69 X10(3) UL (ref 1–4)
LYMPHOCYTES NFR BLD AUTO: 33.6 %
MCH RBC QN AUTO: 30.2 PG (ref 26–34)
MCHC RBC AUTO-ENTMCNC: 33 G/DL (ref 31–37)
MCV RBC AUTO: 91.7 FL
MICROALBUMIN UR-MCNC: 1.14 MG/DL
MICROALBUMIN/CREAT 24H UR-RTO: 5.3 UG/MG (ref ?–30)
MONOCYTES # BLD AUTO: 0.68 X10(3) UL (ref 0.1–1)
MONOCYTES NFR BLD AUTO: 8.5 %
NEUTROPHILS # BLD AUTO: 3.6 X10 (3) UL (ref 1.5–7.7)
NEUTROPHILS # BLD AUTO: 3.6 X10(3) UL (ref 1.5–7.7)
NEUTROPHILS NFR BLD AUTO: 44.9 %
NONHDLC SERPL-MCNC: 121 MG/DL (ref ?–130)
OSMOLALITY SERPL CALC.SUM OF ELEC: 290 MOSM/KG (ref 275–295)
PLATELET # BLD AUTO: 280 10(3)UL (ref 150–450)
POTASSIUM SERPL-SCNC: 4.2 MMOL/L (ref 3.5–5.1)
PROT SERPL-MCNC: 7.2 G/DL (ref 6.4–8.2)
PSA SERPL-MCNC: 1.4 NG/ML (ref ?–4)
RBC # BLD AUTO: 5.06 X10(6)UL
SODIUM SERPL-SCNC: 139 MMOL/L (ref 136–145)
TRIGL SERPL-MCNC: 455 MG/DL (ref 30–149)
TSI SER-ACNC: 1.46 MIU/ML (ref 0.36–3.74)
VLDLC SERPL CALC-MCNC: 66 MG/DL (ref 0–30)
WBC # BLD AUTO: 8 X10(3) UL (ref 4–11)

## 2022-07-20 PROCEDURE — 84153 ASSAY OF PSA TOTAL: CPT | Performed by: INTERNAL MEDICINE

## 2022-07-20 PROCEDURE — 3075F SYST BP GE 130 - 139MM HG: CPT | Performed by: INTERNAL MEDICINE

## 2022-07-20 PROCEDURE — 85025 COMPLETE CBC W/AUTO DIFF WBC: CPT | Performed by: INTERNAL MEDICINE

## 2022-07-20 PROCEDURE — 99396 PREV VISIT EST AGE 40-64: CPT | Performed by: INTERNAL MEDICINE

## 2022-07-20 PROCEDURE — 84443 ASSAY THYROID STIM HORMONE: CPT | Performed by: INTERNAL MEDICINE

## 2022-07-20 PROCEDURE — 82570 ASSAY OF URINE CREATININE: CPT | Performed by: INTERNAL MEDICINE

## 2022-07-20 PROCEDURE — 80061 LIPID PANEL: CPT

## 2022-07-20 PROCEDURE — 82043 UR ALBUMIN QUANTITATIVE: CPT | Performed by: INTERNAL MEDICINE

## 2022-07-20 PROCEDURE — 80053 COMPREHEN METABOLIC PANEL: CPT | Performed by: INTERNAL MEDICINE

## 2022-07-20 PROCEDURE — 36415 COLL VENOUS BLD VENIPUNCTURE: CPT | Performed by: INTERNAL MEDICINE

## 2022-07-20 PROCEDURE — 3008F BODY MASS INDEX DOCD: CPT | Performed by: INTERNAL MEDICINE

## 2022-07-20 PROCEDURE — 3078F DIAST BP <80 MM HG: CPT | Performed by: INTERNAL MEDICINE

## 2022-07-20 PROCEDURE — 83036 HEMOGLOBIN GLYCOSYLATED A1C: CPT | Performed by: INTERNAL MEDICINE

## 2022-07-20 NOTE — TELEPHONE ENCOUNTER
Patient called in and states he has viewed his results on [facility] and noticed abnormalities. Per patient this is his second call as results have come in separately. Notified patient he still has results in process and Dr. Matthew Jose has not reviewed at this time. Explained to patient that results automatically release to [facility] as they come in even if the doctor has not reviewed. Patient aware to expect a call back when Dr. Matthew Jose has reviewed his results. Nothing further needed at this time.

## 2022-07-22 ENCOUNTER — OFFICE VISIT (OUTPATIENT)
Dept: INTERNAL MEDICINE CLINIC | Facility: CLINIC | Age: 57
End: 2022-07-22
Payer: MEDICAID

## 2022-07-22 VITALS
BODY MASS INDEX: 29.06 KG/M2 | SYSTOLIC BLOOD PRESSURE: 156 MMHG | HEIGHT: 70 IN | DIASTOLIC BLOOD PRESSURE: 96 MMHG | HEART RATE: 80 BPM | WEIGHT: 203 LBS

## 2022-07-22 DIAGNOSIS — K62.89 RECTAL PAIN: Primary | ICD-10-CM

## 2022-07-22 PROCEDURE — 3077F SYST BP >= 140 MM HG: CPT | Performed by: INTERNAL MEDICINE

## 2022-07-22 PROCEDURE — 99213 OFFICE O/P EST LOW 20 MIN: CPT | Performed by: INTERNAL MEDICINE

## 2022-07-22 PROCEDURE — 3008F BODY MASS INDEX DOCD: CPT | Performed by: INTERNAL MEDICINE

## 2022-07-22 PROCEDURE — 3080F DIAST BP >= 90 MM HG: CPT | Performed by: INTERNAL MEDICINE

## 2022-07-22 RX ORDER — MELOXICAM 7.5 MG/1
7.5 TABLET ORAL DAILY
Qty: 30 TABLET | Refills: 0 | Status: SHIPPED | OUTPATIENT
Start: 2022-07-22

## 2022-07-31 ENCOUNTER — HOSPITAL ENCOUNTER (INPATIENT)
Facility: HOSPITAL | Age: 57
LOS: 2 days | Discharge: HOME | End: 2022-08-02
Attending: STUDENT IN AN ORGANIZED HEALTH CARE EDUCATION/TRAINING PROGRAM
Payer: MEDICAID

## 2022-07-31 ENCOUNTER — APPOINTMENT (OUTPATIENT)
Dept: CT IMAGING | Facility: HOSPITAL | Age: 57
End: 2022-07-31
Attending: STUDENT IN AN ORGANIZED HEALTH CARE EDUCATION/TRAINING PROGRAM
Payer: MEDICAID

## 2022-07-31 ENCOUNTER — HOSPITAL ENCOUNTER (INPATIENT)
Facility: HOSPITAL | Age: 57
LOS: 2 days | Discharge: HOME | End: 2022-08-02
Attending: STUDENT IN AN ORGANIZED HEALTH CARE EDUCATION/TRAINING PROGRAM | Admitting: STUDENT IN AN ORGANIZED HEALTH CARE EDUCATION/TRAINING PROGRAM
Payer: MEDICAID

## 2022-07-31 DIAGNOSIS — K61.1 PERI-RECTAL ABSCESS: ICD-10-CM

## 2022-07-31 DIAGNOSIS — K61.1 PERIRECTAL ABSCESS: Primary | ICD-10-CM

## 2022-07-31 LAB
ANION GAP SERPL CALC-SCNC: 6 MMOL/L (ref 0–18)
BASOPHILS # BLD AUTO: 0.06 X10(3) UL (ref 0–0.2)
BASOPHILS NFR BLD AUTO: 0.5 %
BUN BLD-MCNC: 10 MG/DL (ref 7–18)
BUN/CREAT SERPL: 11.1 (ref 10–20)
CALCIUM BLD-MCNC: 9.1 MG/DL (ref 8.5–10.1)
CHLORIDE SERPL-SCNC: 106 MMOL/L (ref 98–112)
CO2 SERPL-SCNC: 28 MMOL/L (ref 21–32)
CREAT BLD-MCNC: 0.9 MG/DL
DEPRECATED RDW RBC AUTO: 38.4 FL (ref 35.1–46.3)
EOSINOPHIL # BLD AUTO: 0.38 X10(3) UL (ref 0–0.7)
EOSINOPHIL NFR BLD AUTO: 3.2 %
ERYTHROCYTE [DISTWIDTH] IN BLOOD BY AUTOMATED COUNT: 11.8 % (ref 11–15)
GLUCOSE BLD-MCNC: 105 MG/DL (ref 70–99)
HCT VFR BLD AUTO: 43.4 %
HGB BLD-MCNC: 15.1 G/DL
IMM GRANULOCYTES # BLD AUTO: 0.05 X10(3) UL (ref 0–1)
IMM GRANULOCYTES NFR BLD: 0.4 %
LYMPHOCYTES # BLD AUTO: 1.85 X10(3) UL (ref 1–4)
LYMPHOCYTES NFR BLD AUTO: 15.5 %
MCH RBC QN AUTO: 30.9 PG (ref 26–34)
MCHC RBC AUTO-ENTMCNC: 34.8 G/DL (ref 31–37)
MCV RBC AUTO: 88.9 FL
MONOCYTES # BLD AUTO: 0.98 X10(3) UL (ref 0.1–1)
MONOCYTES NFR BLD AUTO: 8.2 %
NEUTROPHILS # BLD AUTO: 8.58 X10 (3) UL (ref 1.5–7.7)
NEUTROPHILS # BLD AUTO: 8.58 X10(3) UL (ref 1.5–7.7)
NEUTROPHILS NFR BLD AUTO: 72.2 %
OSMOLALITY SERPL CALC.SUM OF ELEC: 289 MOSM/KG (ref 275–295)
PLATELET # BLD AUTO: 253 10(3)UL (ref 150–450)
POTASSIUM SERPL-SCNC: 4 MMOL/L (ref 3.5–5.1)
RBC # BLD AUTO: 4.88 X10(6)UL
SARS-COV-2 RNA RESP QL NAA+PROBE: NOT DETECTED
SODIUM SERPL-SCNC: 140 MMOL/L (ref 136–145)
WBC # BLD AUTO: 11.9 X10(3) UL (ref 4–11)

## 2022-07-31 PROCEDURE — 74177 CT ABD & PELVIS W/CONTRAST: CPT | Performed by: STUDENT IN AN ORGANIZED HEALTH CARE EDUCATION/TRAINING PROGRAM

## 2022-07-31 PROCEDURE — 99222 1ST HOSP IP/OBS MODERATE 55: CPT | Performed by: HOSPITALIST

## 2022-07-31 RX ORDER — ZOLPIDEM TARTRATE 5 MG/1
5 TABLET ORAL NIGHTLY PRN
Status: DISCONTINUED | OUTPATIENT
Start: 2022-07-31 | End: 2022-08-02

## 2022-07-31 RX ORDER — ACETAMINOPHEN 325 MG/1
650 TABLET ORAL EVERY 6 HOURS PRN
Status: DISCONTINUED | OUTPATIENT
Start: 2022-07-31 | End: 2022-08-02

## 2022-07-31 RX ORDER — ONDANSETRON 2 MG/ML
4 INJECTION INTRAMUSCULAR; INTRAVENOUS EVERY 6 HOURS PRN
Status: DISCONTINUED | OUTPATIENT
Start: 2022-07-31 | End: 2022-08-02

## 2022-07-31 RX ORDER — SODIUM CHLORIDE 9 MG/ML
INJECTION, SOLUTION INTRAVENOUS ONCE
Status: COMPLETED | OUTPATIENT
Start: 2022-07-31 | End: 2022-07-31

## 2022-07-31 RX ORDER — METRONIDAZOLE 500 MG/100ML
500 INJECTION, SOLUTION INTRAVENOUS ONCE
Status: COMPLETED | OUTPATIENT
Start: 2022-07-31 | End: 2022-07-31

## 2022-07-31 RX ORDER — KETOROLAC TROMETHAMINE 30 MG/ML
30 INJECTION, SOLUTION INTRAMUSCULAR; INTRAVENOUS EVERY 6 HOURS PRN
Status: DISCONTINUED | OUTPATIENT
Start: 2022-07-31 | End: 2022-08-02

## 2022-07-31 RX ORDER — HYDRALAZINE HYDROCHLORIDE 20 MG/ML
10 INJECTION INTRAMUSCULAR; INTRAVENOUS EVERY 4 HOURS PRN
Status: DISCONTINUED | OUTPATIENT
Start: 2022-07-31 | End: 2022-08-02

## 2022-07-31 RX ORDER — ROSUVASTATIN CALCIUM 10 MG/1
10 TABLET, COATED ORAL NIGHTLY
Status: DISCONTINUED | OUTPATIENT
Start: 2022-08-01 | End: 2022-08-02

## 2022-07-31 RX ORDER — HEPARIN SODIUM 5000 [USP'U]/ML
5000 INJECTION, SOLUTION INTRAVENOUS; SUBCUTANEOUS EVERY 12 HOURS SCHEDULED
Status: DISCONTINUED | OUTPATIENT
Start: 2022-08-01 | End: 2022-08-02

## 2022-07-31 RX ORDER — MORPHINE SULFATE 2 MG/ML
2 INJECTION, SOLUTION INTRAMUSCULAR; INTRAVENOUS EVERY 2 HOUR PRN
Status: DISCONTINUED | OUTPATIENT
Start: 2022-07-31 | End: 2022-08-02

## 2022-07-31 RX ORDER — PANTOPRAZOLE SODIUM 40 MG/1
40 TABLET, DELAYED RELEASE ORAL
Status: DISCONTINUED | OUTPATIENT
Start: 2022-08-01 | End: 2022-08-02

## 2022-07-31 RX ORDER — HYDROCODONE BITARTRATE AND ACETAMINOPHEN 5; 325 MG/1; MG/1
1 TABLET ORAL ONCE
Status: COMPLETED | OUTPATIENT
Start: 2022-07-31 | End: 2022-07-31

## 2022-07-31 RX ORDER — IBUPROFEN 600 MG/1
600 TABLET ORAL ONCE
COMMUNITY
End: 2022-08-03

## 2022-07-31 RX ORDER — LATANOPROST 50 UG/ML
1 SOLUTION/ DROPS OPHTHALMIC NIGHTLY
Status: DISCONTINUED | OUTPATIENT
Start: 2022-08-01 | End: 2022-08-02

## 2022-07-31 RX ORDER — MORPHINE SULFATE 4 MG/ML
4 INJECTION, SOLUTION INTRAMUSCULAR; INTRAVENOUS EVERY 2 HOUR PRN
Status: DISCONTINUED | OUTPATIENT
Start: 2022-07-31 | End: 2022-08-02

## 2022-07-31 RX ORDER — HYDROCODONE BITARTRATE AND ACETAMINOPHEN 5; 325 MG/1; MG/1
1 TABLET ORAL EVERY 6 HOURS PRN
Status: DISCONTINUED | OUTPATIENT
Start: 2022-07-31 | End: 2022-08-02
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
## 2022-08-01 ENCOUNTER — ANESTHESIA (OUTPATIENT)
Dept: SURGERY | Facility: HOSPITAL | Age: 57
End: 2022-08-01
Payer: MEDICAID

## 2022-08-01 ENCOUNTER — ANESTHESIA EVENT (OUTPATIENT)
Dept: SURGERY | Facility: HOSPITAL | Age: 57
End: 2022-08-01
Payer: MEDICAID

## 2022-08-01 DIAGNOSIS — K61.1 PERI-RECTAL ABSCESS: Primary | ICD-10-CM

## 2022-08-01 LAB
ANION GAP SERPL CALC-SCNC: 7 MMOL/L (ref 0–18)
BASOPHILS # BLD AUTO: 0.07 X10(3) UL (ref 0–0.2)
BASOPHILS NFR BLD AUTO: 0.7 %
BUN BLD-MCNC: 12 MG/DL (ref 7–18)
BUN/CREAT SERPL: 12.8 (ref 10–20)
CALCIUM BLD-MCNC: 9.3 MG/DL (ref 8.5–10.1)
CHLORIDE SERPL-SCNC: 106 MMOL/L (ref 98–112)
CO2 SERPL-SCNC: 28 MMOL/L (ref 21–32)
CREAT BLD-MCNC: 0.94 MG/DL
DEPRECATED RDW RBC AUTO: 38.7 FL (ref 35.1–46.3)
EOSINOPHIL # BLD AUTO: 0.56 X10(3) UL (ref 0–0.7)
EOSINOPHIL NFR BLD AUTO: 5.7 %
ERYTHROCYTE [DISTWIDTH] IN BLOOD BY AUTOMATED COUNT: 11.8 % (ref 11–15)
GLUCOSE BLD-MCNC: 123 MG/DL (ref 70–99)
GLUCOSE BLDC GLUCOMTR-MCNC: 131 MG/DL (ref 70–99)
HCT VFR BLD AUTO: 43 %
HGB BLD-MCNC: 14.6 G/DL
IMM GRANULOCYTES # BLD AUTO: 0.04 X10(3) UL (ref 0–1)
IMM GRANULOCYTES NFR BLD: 0.4 %
LYMPHOCYTES # BLD AUTO: 2.04 X10(3) UL (ref 1–4)
LYMPHOCYTES NFR BLD AUTO: 20.7 %
MCH RBC QN AUTO: 30.5 PG (ref 26–34)
MCHC RBC AUTO-ENTMCNC: 34 G/DL (ref 31–37)
MCV RBC AUTO: 90 FL
MONOCYTES # BLD AUTO: 0.98 X10(3) UL (ref 0.1–1)
MONOCYTES NFR BLD AUTO: 9.9 %
NEUTROPHILS # BLD AUTO: 6.18 X10 (3) UL (ref 1.5–7.7)
NEUTROPHILS # BLD AUTO: 6.18 X10(3) UL (ref 1.5–7.7)
NEUTROPHILS NFR BLD AUTO: 62.6 %
OSMOLALITY SERPL CALC.SUM OF ELEC: 293 MOSM/KG (ref 275–295)
PLATELET # BLD AUTO: 253 10(3)UL (ref 150–450)
POTASSIUM SERPL-SCNC: 4.3 MMOL/L (ref 3.5–5.1)
RBC # BLD AUTO: 4.78 X10(6)UL
SODIUM SERPL-SCNC: 141 MMOL/L (ref 136–145)
WBC # BLD AUTO: 9.9 X10(3) UL (ref 4–11)

## 2022-08-01 PROCEDURE — 45005 DRAINAGE OF RECTAL ABSCESS: CPT | Performed by: SURGERY

## 2022-08-01 PROCEDURE — 99233 SBSQ HOSP IP/OBS HIGH 50: CPT | Performed by: HOSPITALIST

## 2022-08-01 PROCEDURE — 0DJD8ZZ INSPECTION OF LOWER INTESTINAL TRACT, VIA NATURAL OR ARTIFICIAL OPENING ENDOSCOPIC: ICD-10-PCS | Performed by: SURGERY

## 2022-08-01 PROCEDURE — 0D9R0ZZ DRAINAGE OF ANAL SPHINCTER, OPEN APPROACH: ICD-10-PCS | Performed by: SURGERY

## 2022-08-01 PROCEDURE — 99253 IP/OBS CNSLTJ NEW/EST LOW 45: CPT | Performed by: SURGERY

## 2022-08-01 RX ORDER — DEXTROSE MONOHYDRATE 25 G/50ML
50 INJECTION, SOLUTION INTRAVENOUS
Status: DISCONTINUED | OUTPATIENT
Start: 2022-08-01 | End: 2022-08-01 | Stop reason: HOSPADM

## 2022-08-01 RX ORDER — IBUPROFEN 600 MG/1
600 TABLET ORAL EVERY 6 HOURS PRN
Qty: 15 TABLET | Refills: 1 | Status: SHIPPED | OUTPATIENT
Start: 2022-08-01 | End: 2022-08-08

## 2022-08-01 RX ORDER — NICOTINE POLACRILEX 4 MG
15 LOZENGE BUCCAL
Status: DISCONTINUED | OUTPATIENT
Start: 2022-08-01 | End: 2022-08-01 | Stop reason: HOSPADM

## 2022-08-01 RX ORDER — IBUPROFEN 400 MG/1
400 TABLET ORAL EVERY 6 HOURS PRN
Status: DISCONTINUED | OUTPATIENT
Start: 2022-08-01 | End: 2022-08-02

## 2022-08-01 RX ORDER — HYDROMORPHONE HYDROCHLORIDE 1 MG/ML
0.4 INJECTION, SOLUTION INTRAMUSCULAR; INTRAVENOUS; SUBCUTANEOUS EVERY 5 MIN PRN
Status: DISCONTINUED | OUTPATIENT
Start: 2022-08-01 | End: 2022-08-01 | Stop reason: HOSPADM

## 2022-08-01 RX ORDER — PROCHLORPERAZINE EDISYLATE 5 MG/ML
5 INJECTION INTRAMUSCULAR; INTRAVENOUS EVERY 8 HOURS PRN
Status: DISCONTINUED | OUTPATIENT
Start: 2022-08-01 | End: 2022-08-01 | Stop reason: HOSPADM

## 2022-08-01 RX ORDER — HYDROMORPHONE HYDROCHLORIDE 1 MG/ML
0.6 INJECTION, SOLUTION INTRAMUSCULAR; INTRAVENOUS; SUBCUTANEOUS EVERY 5 MIN PRN
Status: DISCONTINUED | OUTPATIENT
Start: 2022-08-01 | End: 2022-08-01 | Stop reason: HOSPADM

## 2022-08-01 RX ORDER — HYDROMORPHONE HYDROCHLORIDE 1 MG/ML
0.2 INJECTION, SOLUTION INTRAMUSCULAR; INTRAVENOUS; SUBCUTANEOUS EVERY 5 MIN PRN
Status: DISCONTINUED | OUTPATIENT
Start: 2022-08-01 | End: 2022-08-01 | Stop reason: HOSPADM

## 2022-08-01 RX ORDER — MORPHINE SULFATE 4 MG/ML
2 INJECTION, SOLUTION INTRAMUSCULAR; INTRAVENOUS EVERY 10 MIN PRN
Status: DISCONTINUED | OUTPATIENT
Start: 2022-08-01 | End: 2022-08-01 | Stop reason: HOSPADM

## 2022-08-01 RX ORDER — BUPIVACAINE HYDROCHLORIDE AND EPINEPHRINE 2.5; 5 MG/ML; UG/ML
INJECTION, SOLUTION INFILTRATION; PERINEURAL AS NEEDED
Status: DISCONTINUED | OUTPATIENT
Start: 2022-08-01 | End: 2022-08-01

## 2022-08-01 RX ORDER — SODIUM CHLORIDE 9 MG/ML
INJECTION, SOLUTION INTRAVENOUS CONTINUOUS
Status: DISCONTINUED | OUTPATIENT
Start: 2022-08-01 | End: 2022-08-02

## 2022-08-01 RX ORDER — NICOTINE POLACRILEX 4 MG
30 LOZENGE BUCCAL
Status: DISCONTINUED | OUTPATIENT
Start: 2022-08-01 | End: 2022-08-01 | Stop reason: HOSPADM

## 2022-08-01 RX ORDER — MORPHINE SULFATE 10 MG/ML
6 INJECTION, SOLUTION INTRAMUSCULAR; INTRAVENOUS EVERY 10 MIN PRN
Status: DISCONTINUED | OUTPATIENT
Start: 2022-08-01 | End: 2022-08-01 | Stop reason: HOSPADM

## 2022-08-01 RX ORDER — SODIUM CHLORIDE, SODIUM LACTATE, POTASSIUM CHLORIDE, CALCIUM CHLORIDE 600; 310; 30; 20 MG/100ML; MG/100ML; MG/100ML; MG/100ML
INJECTION, SOLUTION INTRAVENOUS CONTINUOUS
Status: DISCONTINUED | OUTPATIENT
Start: 2022-08-01 | End: 2022-08-01 | Stop reason: HOSPADM

## 2022-08-01 RX ORDER — DEXAMETHASONE SODIUM PHOSPHATE 4 MG/ML
VIAL (ML) INJECTION AS NEEDED
Status: DISCONTINUED | OUTPATIENT
Start: 2022-08-01 | End: 2022-08-01 | Stop reason: SURG

## 2022-08-01 RX ORDER — IBUPROFEN 600 MG/1
600 TABLET ORAL EVERY 6 HOURS PRN
Status: DISCONTINUED | OUTPATIENT
Start: 2022-08-01 | End: 2022-08-02

## 2022-08-01 RX ORDER — ONDANSETRON 2 MG/ML
INJECTION INTRAMUSCULAR; INTRAVENOUS AS NEEDED
Status: DISCONTINUED | OUTPATIENT
Start: 2022-08-01 | End: 2022-08-01 | Stop reason: SURG

## 2022-08-01 RX ORDER — AMOXICILLIN AND CLAVULANATE POTASSIUM 875; 125 MG/1; MG/1
1 TABLET, FILM COATED ORAL 2 TIMES DAILY
Qty: 14 TABLET | Refills: 0 | Status: SHIPPED | OUTPATIENT
Start: 2022-08-01 | End: 2022-08-04

## 2022-08-01 RX ORDER — ONDANSETRON 2 MG/ML
4 INJECTION INTRAMUSCULAR; INTRAVENOUS EVERY 6 HOURS PRN
Status: DISCONTINUED | OUTPATIENT
Start: 2022-08-01 | End: 2022-08-01 | Stop reason: HOSPADM

## 2022-08-01 RX ORDER — NALOXONE HYDROCHLORIDE 0.4 MG/ML
80 INJECTION, SOLUTION INTRAMUSCULAR; INTRAVENOUS; SUBCUTANEOUS AS NEEDED
Status: DISCONTINUED | OUTPATIENT
Start: 2022-08-01 | End: 2022-08-01 | Stop reason: HOSPADM

## 2022-08-01 RX ORDER — MORPHINE SULFATE 4 MG/ML
4 INJECTION, SOLUTION INTRAMUSCULAR; INTRAVENOUS EVERY 10 MIN PRN
Status: DISCONTINUED | OUTPATIENT
Start: 2022-08-01 | End: 2022-08-01 | Stop reason: HOSPADM

## 2022-08-01 RX ORDER — HYDROCODONE BITARTRATE AND ACETAMINOPHEN 5; 325 MG/1; MG/1
1 TABLET ORAL EVERY 6 HOURS PRN
Qty: 15 TABLET | Refills: 0 | Status: SHIPPED | OUTPATIENT
Start: 2022-08-01

## 2022-08-01 RX ORDER — MIDAZOLAM HYDROCHLORIDE 1 MG/ML
INJECTION INTRAMUSCULAR; INTRAVENOUS AS NEEDED
Status: DISCONTINUED | OUTPATIENT
Start: 2022-08-01 | End: 2022-08-01 | Stop reason: SURG

## 2022-08-01 RX ORDER — HYDROCODONE BITARTRATE AND ACETAMINOPHEN 10; 325 MG/1; MG/1
1 TABLET ORAL EVERY 4 HOURS PRN
Status: DISCONTINUED | OUTPATIENT
Start: 2022-08-01 | End: 2022-08-02

## 2022-08-01 RX ADMIN — ONDANSETRON 4 MG: 2 INJECTION INTRAMUSCULAR; INTRAVENOUS at 10:00:00

## 2022-08-01 RX ADMIN — MIDAZOLAM HYDROCHLORIDE 2 MG: 1 INJECTION INTRAMUSCULAR; INTRAVENOUS at 09:52:00

## 2022-08-01 RX ADMIN — DEXAMETHASONE SODIUM PHOSPHATE 4 MG: 4 MG/ML VIAL (ML) INJECTION at 10:00:00

## 2022-08-01 NOTE — ED QUICK NOTES
Orders for admission, patient is aware of plan and ready to go upstairs. Any questions, please call ED RN [facility], [address] at [address]      Patient Covid vaccination status: Unvaccinated     COVID Test Ordered in ED: Rapid SARS-CoV-2 by PCR    COVID Suspicion at Admission: Low clinical suspicion for COVID    Running Infusions:      Mental Status/LOC at time of transport: A&O*4     Other pertinent information:   CIWA score: N/A   NIH score:  N/A

## 2022-08-01 NOTE — INTERVAL H&P NOTE
Pre-op Diagnosis: Nelsy-rectal abscess [K61.1]    The above referenced H&P was reviewed by Srini Urias MD on 8/1/2022, the patient was examined and no significant changes have occurred in the patient's condition since the H&P was performed. I discussed with the patient and/or legal representative the potential benefits, risks and side effects of this procedure; the likelihood of the patient achieving goals; and potential problems that might occur during recuperation. I discussed reasonable alternatives to the procedure, including risks, benefits and side effects related to the alternatives and risks related to not receiving this procedure. We will proceed with procedure as planned.     Deep anal rectal abscess plan incision and drainage

## 2022-08-01 NOTE — ANESTHESIA PROCEDURE NOTES
Airway  Date/Time: 8/1/2022 9:58 AM  Urgency: Elective    Airway not difficult    General Information and Staff    Patient location during procedure: OR  Anesthesiologist: Linsey Levy MD  Performed: anesthesiologist     Indications and Patient Condition  Indications for airway management: anesthesia  Sedation level: deep  Preoxygenated: yes  Patient position: sniffing  Mask difficulty assessment: 1 - vent by mask    Final Airway Details  Final airway type: supraglottic airway      Successful airway: classic  Size 4      Number of attempts at approach: 1

## 2022-08-01 NOTE — OPERATIVE REPORT
[facility]    PATIENT'S NAME: Lata Schwab   ATTENDING PHYSICIAN: Monica Becker DO   OPERATING PHYSICIAN: Nando Sellers MD   PATIENT ACCOUNT#:   [de-identified]    LOCATION:  [address] [place] [address] Anil Hayward RECORD #:   [id number]       YOB: 1965  ADMISSION DATE:       07/31/2022      OPERATION DATE:  08/01/2022    OPERATIVE REPORT      PREOPERATIVE DIAGNOSIS:  Right anorectal abscess. POSTOPERATIVE DIAGNOSIS:  Intersphincteric abscess. PROCEDURE:  Exam under anesthesia, anoscopy, incision and drainage of deep intersphincteric abscess, lavage and packing. ESTIMATED BLOOD LOSS:  Minimal.    COMPLICATION:  None. ANESTHESIA:  General.    DISPOSITION:  To Recovery, tolerated well. INDICATIONS:  Patient is 61, with the above complaint. Consent obtained. OPERATIVE TECHNIQUE:  He was taken to surgery, placed in lithotomy position, is prepped and draped in usual sterile fashion. Area of induration is felt at 10 o'clock. The anoscope was inserted. There was no evidence of a fistula or Crohn disease. A stat stab incision is made and copious amounts of purulent material was evacuated. This is cultured, irrigated copiously. Packed with iodoform and sterile dressings. Marcaine infiltrated for local block.      Dictated By Nando Sellers MD  d: 08/01/2022 10:20:48  t: 08/01/2022 18:47:24  Job [id number]  [facility]/    cc: Pao Thakkar MD

## 2022-08-01 NOTE — PROGRESS NOTES
General surgery consult    Consult dictated    Deep anal rectal abscess plan incision and drainage today. Risk benefits options explained.

## 2022-08-01 NOTE — PLAN OF CARE
Problem: Patient Centered Care  Goal: Patient preferences are identified and integrated in the patient's plan of care  Description: Interventions:  - What would you like us to know as we care for you?   - Provide timely, complete, and accurate information to patient/family  - Incorporate patient and family knowledge, values, beliefs, and cultural backgrounds into the planning and delivery of care  - Encourage patient/family to participate in care and decision-making at the level they choose  - Honor patient and family perspectives and choices  Outcome: Progressing     Problem: Patient/Family Goals  Goal: Patient/Family Long Term Goal  Description: Patient's Long Term Goal:     Interventions:  -   - See additional Care Plan goals for specific interventions  Outcome: Progressing  Goal: Patient/Family Short Term Goal  Description: Patient's Short Term Goal:     Interventions:   -   - See additional Care Plan goals for specific interventions  Outcome: Progressing     Problem: GASTROINTESTINAL - ADULT  Goal: Minimal or absence of nausea and vomiting  Description: INTERVENTIONS:  - Maintain adequate hydration with IV or PO as ordered and tolerated  - Nasogastric tube to low intermittent suction as ordered  - Evaluate effectiveness of ordered antiemetic medications  - Provide nonpharmacologic comfort measures as appropriate  - Advance diet as tolerated, if ordered  - Obtain nutritional consult as needed  - Evaluate fluid balance  Outcome: Progressing  Goal: Maintains or returns to baseline bowel function  Description: INTERVENTIONS:  - Assess bowel function  - Maintain adequate hydration with IV or PO as ordered and tolerated  - Evaluate effectiveness of GI medications  - Encourage mobilization and activity  - Obtain nutritional consult as needed  - Establish a toileting routine/schedule  - Consider collaborating with pharmacy to review patient's medication profile  Outcome: Progressing     Problem: SKIN/TISSUE INTEGRITY - ADULT  Goal: Skin integrity remains intact  Description: INTERVENTIONS  - Assess and document risk factors for pressure ulcer development  - Assess and document skin integrity  - Monitor for areas of redness and/or skin breakdown  - Initiate interventions, skin care algorithm/standards of care as needed  Outcome: Progressing     Problem: PAIN - ADULT  Goal: Verbalizes/displays adequate comfort level or patient's stated pain goal  Description: INTERVENTIONS:  - Encourage pt to monitor pain and request assistance  - Assess pain using appropriate pain scale  - Administer analgesics based on type and severity of pain and evaluate response  - Implement non-pharmacological measures as appropriate and evaluate response  - Consider cultural and social influences on pain and pain management  - Manage/alleviate anxiety  - Utilize distraction and/or relaxation techniques  - Monitor for opioid side effects  - Notify MD/LIP if interventions unsuccessful or patient reports new pain  - Anticipate increased pain with activity and pre-medicate as appropriate  Outcome: Progressing     Received patient from ED accompanied by his wife and daughter. Patient oriented to unit routine. Patient continues on IV fluids and IV antibiotics. Patient rated pain at 2/10. Discussed safety precautions and plan of care. Voiding freely. Continue to monitor.

## 2022-08-01 NOTE — ED QUICK NOTES
Pt currently resting in room comfortably, pt denies any questions or concerns. Pt denies chest pain, SOB, n/v/d, dizziness, numbness and/or tingling. Family bedside.   Pt walking around room w/ steady gait
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
## 2022-08-01 NOTE — ANESTHESIA POSTPROCEDURE EVALUATION
Patient: Abraham Munroe    Procedure Summary     Date: 08/01/22 Room / Location: [address] [place] [address] MAIN OR 03 / [address] [place] [address] MAIN OR    Anesthesia Start: 3995 Anesthesia Stop: 9613    Procedure: ABSCESS IRRIGATION & DEBRIDEMENT NELSY-RECTAL (N/A ) Diagnosis:       Nelsy-rectal abscess      (Nelsy-rectal abscess [K61.1])    Surgeons: Karen Martinez MD Anesthesiologist: Gokul Phillip MD    Anesthesia Type: general ASA Status: 2          Anesthesia Type: general    Vitals Value Taken Time   /78 08/01/22 1039   Temp 98.5 08/01/22 1046   Pulse 70 08/01/22 1046   Resp 9 08/01/22 1046   SpO2 96 % 08/01/22 1046   Vitals shown include unvalidated device data.     [address] [place] [address] AN Post Evaluation:   Patient Evaluated in PACU  Patient Participation: complete - patient participated  Level of Consciousness: awake  Pain Score: 0  Pain Management: adequate  Airway Patency:patent  Dental exam unchanged from preop  Yes    Cardiovascular Status: acceptable  Respiratory Status: acceptable  Postoperative Hydration acceptable      Scar Lucia MD  8/1/2022 10:46 AM

## 2022-08-01 NOTE — ED QUICK NOTES
Pt resting comfortably in bed, pt denies needing anything at the moment. Pt updated regarding abx and treatment, pt verbalized understanding.

## 2022-08-01 NOTE — BRIEF OP NOTE
Pre-Operative Diagnosis: Nelsy-rectal abscess [K61.1]     Post-Operative Diagnosis: Nelsy-rectal abscess [K61.1]      Procedure Performed:   Anoscopy, incision and drainage of right intersphincteric abscess, lavage    Surgeon(s) and Role:     Emerson Tee MD - Primary    Assistant(s):        Surgical Findings: Intersphincteric abscess     Specimen: Culture     Estimated Blood Loss: 5 cc    Dictation Number:      Riley Rubio MD  8/1/2022  10:13 AM

## 2022-08-01 NOTE — ED QUICK NOTES
Pt medicated, pt currently denies needing anything at the moment. C/o pain in rectum, denies all other complains at the moment.

## 2022-08-02 VITALS
HEART RATE: 68 BPM | HEIGHT: 70 IN | SYSTOLIC BLOOD PRESSURE: 159 MMHG | BODY MASS INDEX: 28.72 KG/M2 | DIASTOLIC BLOOD PRESSURE: 83 MMHG | WEIGHT: 200.63 LBS | TEMPERATURE: 98 F | RESPIRATION RATE: 16 BRPM | OXYGEN SATURATION: 98 %

## 2022-08-02 LAB
ANION GAP SERPL CALC-SCNC: 5 MMOL/L (ref 0–18)
BASOPHILS # BLD AUTO: 0.02 X10(3) UL (ref 0–0.2)
BASOPHILS NFR BLD AUTO: 0.2 %
BUN BLD-MCNC: 10 MG/DL (ref 7–18)
BUN/CREAT SERPL: 10.5 (ref 10–20)
CALCIUM BLD-MCNC: 9.1 MG/DL (ref 8.5–10.1)
CHLORIDE SERPL-SCNC: 106 MMOL/L (ref 98–112)
CO2 SERPL-SCNC: 28 MMOL/L (ref 21–32)
CREAT BLD-MCNC: 0.95 MG/DL
DEPRECATED RDW RBC AUTO: 38.5 FL (ref 35.1–46.3)
EOSINOPHIL # BLD AUTO: 0.07 X10(3) UL (ref 0–0.7)
EOSINOPHIL NFR BLD AUTO: 0.6 %
ERYTHROCYTE [DISTWIDTH] IN BLOOD BY AUTOMATED COUNT: 11.9 % (ref 11–15)
GLUCOSE BLD-MCNC: 129 MG/DL (ref 70–99)
HCT VFR BLD AUTO: 44.2 %
HGB BLD-MCNC: 15 G/DL
IMM GRANULOCYTES # BLD AUTO: 0.05 X10(3) UL (ref 0–1)
IMM GRANULOCYTES NFR BLD: 0.4 %
LYMPHOCYTES # BLD AUTO: 2.09 X10(3) UL (ref 1–4)
LYMPHOCYTES NFR BLD AUTO: 17.8 %
MCH RBC QN AUTO: 30.4 PG (ref 26–34)
MCHC RBC AUTO-ENTMCNC: 33.9 G/DL (ref 31–37)
MCV RBC AUTO: 89.7 FL
MONOCYTES # BLD AUTO: 1.03 X10(3) UL (ref 0.1–1)
MONOCYTES NFR BLD AUTO: 8.8 %
NEUTROPHILS # BLD AUTO: 8.51 X10 (3) UL (ref 1.5–7.7)
NEUTROPHILS # BLD AUTO: 8.51 X10(3) UL (ref 1.5–7.7)
NEUTROPHILS NFR BLD AUTO: 72.2 %
OSMOLALITY SERPL CALC.SUM OF ELEC: 289 MOSM/KG (ref 275–295)
PLATELET # BLD AUTO: 272 10(3)UL (ref 150–450)
POTASSIUM SERPL-SCNC: 4.2 MMOL/L (ref 3.5–5.1)
RBC # BLD AUTO: 4.93 X10(6)UL
SODIUM SERPL-SCNC: 139 MMOL/L (ref 136–145)
WBC # BLD AUTO: 11.8 X10(3) UL (ref 4–11)

## 2022-08-02 PROCEDURE — 99239 HOSP IP/OBS DSCHRG MGMT >30: CPT | Performed by: HOSPITALIST

## 2022-08-02 NOTE — PLAN OF CARE
Patient's pain managed with Toradol. Ambulates independently. IV antibiotics given. RA. No nausea or vomiting. NO BM overnight. Safety precautions in place, call light within reach. Problem: Patient Centered Care  Goal: Patient preferences are identified and integrated in the patient's plan of care  Description: Interventions:  - What would you like us to know as we care for you?  From home with family  - Provide timely, complete, and accurate information to patient/family  - Incorporate patient and family knowledge, values, beliefs, and cultural backgrounds into the planning and delivery of care  - Encourage patient/family to participate in care and decision-making at the level they choose  - Honor patient and family perspectives and choices  Outcome: Progressing     Problem: Patient/Family Goals  Goal: Patient/Family Long Term Goal  Description: Patient's Long Term Goal:    Interventions:  - See additional Care Plan goals for specific interventions  Outcome: Progressing  Goal: Patient/Family Short Term Goal  Description: Patient's Short Term Goal:    Interventions:   - See additional Care Plan goals for specific interventions  Outcome: Progressing     Problem: GASTROINTESTINAL - ADULT  Goal: Minimal or absence of nausea and vomiting  Description: INTERVENTIONS:  - Maintain adequate hydration with IV or PO as ordered and tolerated  - Nasogastric tube to low intermittent suction as ordered  - Evaluate effectiveness of ordered antiemetic medications  - Provide nonpharmacologic comfort measures as appropriate  - Advance diet as tolerated, if ordered  - Obtain nutritional consult as needed  - Evaluate fluid balance  Outcome: Progressing  Goal: Maintains or returns to baseline bowel function  Description: INTERVENTIONS:  - Assess bowel function  - Maintain adequate hydration with IV or PO as ordered and tolerated  - Evaluate effectiveness of GI medications  - Encourage mobilization and activity  - Obtain nutritional consult as needed  - Establish a toileting routine/schedule  - Consider collaborating with pharmacy to review patient's medication profile  Outcome: Progressing     Problem: SKIN/TISSUE INTEGRITY - ADULT  Goal: Skin integrity remains intact  Description: INTERVENTIONS  - Assess and document risk factors for pressure ulcer development  - Assess and document skin integrity  - Monitor for areas of redness and/or skin breakdown  - Initiate interventions, skin care algorithm/standards of care as needed  Outcome: Progressing     Problem: PAIN - ADULT  Goal: Verbalizes/displays adequate comfort level or patient's stated pain goal  Description: INTERVENTIONS:  - Encourage pt to monitor pain and request assistance  - Assess pain using appropriate pain scale  - Administer analgesics based on type and severity of pain and evaluate response  - Implement non-pharmacological measures as appropriate and evaluate response  - Consider cultural and social influences on pain and pain management  - Manage/alleviate anxiety  - Utilize distraction and/or relaxation techniques  - Monitor for opioid side effects  - Notify MD/LIP if interventions unsuccessful or patient reports new pain  - Anticipate increased pain with activity and pre-medicate as appropriate  Outcome: Progressing

## 2022-08-02 NOTE — PLAN OF CARE
Problem: Patient Centered Care  Goal: Patient preferences are identified and integrated in the patient's plan of care  Description: Interventions:    - Provide timely, complete, and accurate information to patient/family  - Incorporate patient and family knowledge, values, beliefs, and cultural backgrounds into the planning and delivery of care  - Encourage patient/family to participate in care and decision-making at the level they choose  - Honor patient and family perspectives and choices  Outcome: Progressing     Problem: GASTROINTESTINAL - ADULT  Goal: Minimal or absence of nausea and vomiting  Description: INTERVENTIONS:  - Maintain adequate hydration with IV or PO as ordered and tolerated  - Nasogastric tube to low intermittent suction as ordered  - Evaluate effectiveness of ordered antiemetic medications  - Provide nonpharmacologic comfort measures as appropriate  - Advance diet as tolerated, if ordered  - Obtain nutritional consult as needed  - Evaluate fluid balance  Outcome: Progressing  Goal: Maintains or returns to baseline bowel function  Description: INTERVENTIONS:  - Assess bowel function  - Maintain adequate hydration with IV or PO as ordered and tolerated  - Evaluate effectiveness of GI medications  - Encourage mobilization and activity  - Obtain nutritional consult as needed  - Establish a toileting routine/schedule  - Consider collaborating with pharmacy to review patient's medication profile  Outcome: Progressing     Problem: SKIN/TISSUE INTEGRITY - ADULT  Goal: Skin integrity remains intact  Description: INTERVENTIONS  - Assess and document risk factors for pressure ulcer development  - Assess and document skin integrity  - Monitor for areas of redness and/or skin breakdown  - Initiate interventions, skin care algorithm/standards of care as needed  Outcome: Progressing     Problem: PAIN - ADULT  Goal: Verbalizes/displays adequate comfort level or patient's stated pain goal  Description: INTERVENTIONS:  - Encourage pt to monitor pain and request assistance  - Assess pain using appropriate pain scale  - Administer analgesics based on type and severity of pain and evaluate response  - Implement non-pharmacological measures as appropriate and evaluate response  - Consider cultural and social influences on pain and pain management  - Manage/alleviate anxiety  - Utilize distraction and/or relaxation techniques  - Monitor for opioid side effects  - Notify MD/LIP if interventions unsuccessful or patient reports new pain  - Anticipate increased pain with activity and pre-medicate as appropriate  Outcome: Progressing   Patient alert and  oriented X 4.VS WNL   Patient had I & D of rectal abscess this morning, dressing in place. Pain managed with Norco, Toradol and Ice pack PRN. Continue IV Zosyn and IV fluid. General diet. Zofran given once for c/o nausea possibly side effect from [address]. Patient independent ambulating in halls. Fall precautions in place.

## 2022-08-02 NOTE — PROGRESS NOTES
Patient alert and oriented X4, vitals stable. Pain controlled with toradol. Tolerating diet. Iodoform packing removed per surgeons order. Patient did not want to repack as he stated Dr Verito Ward told him he could leave the packing our and dry clean with warm water and allow to dry (Rn witnessed this conversation). 4X4 gauze, abd pad and mes panties in place. Discharge instruction reviewed with patient at bedside including need for follow up appointment; new and continued medication; incision care; and when to seek medical attention. Patient supplies with some gauze, abd pads and mesh underwear. PIV removed. All belongings with patient.

## 2022-08-02 NOTE — PROGRESS NOTES
General surgery note    Patient doing well overnight. Plan to change packing today and discharge home per hospitalist.  Prescriptions written.   Follow-up Dr. Verito Ward in 1 week

## 2022-08-03 ENCOUNTER — TELEPHONE (OUTPATIENT)
Dept: INTERNAL MEDICINE CLINIC | Facility: CLINIC | Age: 57
End: 2022-08-03

## 2022-08-03 ENCOUNTER — PATIENT OUTREACH (OUTPATIENT)
Dept: CASE MANAGEMENT | Age: 57
End: 2022-08-03

## 2022-08-03 RX ORDER — CEPHALEXIN 500 MG/1
500 CAPSULE ORAL 2 TIMES DAILY
Qty: 14 CAPSULE | Refills: 0 | Status: SHIPPED | OUTPATIENT
Start: 2022-08-03 | End: 2022-08-10

## 2022-08-03 NOTE — TELEPHONE ENCOUNTER
Spoke to pt for TCM today. Pt does not have HFU appt scheduled at this time. TCM/HFU appt recommended by 8/16/2022, as pt is a moderate risk for readmission. Patient will call Dr. Ariella Ceballos, general surgery for f/u appt, but declines with PCP, as he is leaving for [place] in 4-5 days, \"and I won't have time. \" Please advise. BOOK BY DATE (last date for TCM): 8/16/2022    Clinical staff:  Please f/u with pt and try to get them to schedule as pt would greatly benefit from TCM/HFU appt. Thank you!

## 2022-08-04 ENCOUNTER — LAB ENCOUNTER (OUTPATIENT)
Dept: LAB | Age: 57
End: 2022-08-04
Attending: PHYSICIAN ASSISTANT
Payer: MEDICAID

## 2022-08-04 ENCOUNTER — TELEPHONE (OUTPATIENT)
Dept: INTERNAL MEDICINE CLINIC | Facility: CLINIC | Age: 57
End: 2022-08-04

## 2022-08-04 ENCOUNTER — OFFICE VISIT (OUTPATIENT)
Dept: INTERNAL MEDICINE CLINIC | Facility: CLINIC | Age: 57
End: 2022-08-04
Payer: MEDICAID

## 2022-08-04 VITALS
WEIGHT: 201 LBS | HEART RATE: 73 BPM | DIASTOLIC BLOOD PRESSURE: 89 MMHG | HEIGHT: 70 IN | BODY MASS INDEX: 28.77 KG/M2 | SYSTOLIC BLOOD PRESSURE: 133 MMHG

## 2022-08-04 DIAGNOSIS — K61.1 PERIRECTAL ABSCESS: Primary | ICD-10-CM

## 2022-08-04 DIAGNOSIS — D72.829 LEUKOCYTOSIS, UNSPECIFIED TYPE: ICD-10-CM

## 2022-08-04 LAB
BASOPHILS # BLD AUTO: 0.04 X10(3) UL (ref 0–0.2)
BASOPHILS NFR BLD AUTO: 0.6 %
CHOLEST SERPL-MCNC: 125 MG/DL (ref ?–200)
DEPRECATED RDW RBC AUTO: 40.1 FL (ref 35.1–46.3)
EOSINOPHIL # BLD AUTO: 0.46 X10(3) UL (ref 0–0.7)
EOSINOPHIL NFR BLD AUTO: 6.8 %
ERYTHROCYTE [DISTWIDTH] IN BLOOD BY AUTOMATED COUNT: 12.1 % (ref 11–15)
FASTING PATIENT LIPID ANSWER: NO
HCT VFR BLD AUTO: 45.7 %
HDLC SERPL-MCNC: 30 MG/DL (ref 40–59)
HGB BLD-MCNC: 15.2 G/DL
IMM GRANULOCYTES # BLD AUTO: 0.03 X10(3) UL (ref 0–1)
IMM GRANULOCYTES NFR BLD: 0.4 %
LDLC SERPL CALC-MCNC: 68 MG/DL (ref ?–100)
LYMPHOCYTES # BLD AUTO: 2.07 X10(3) UL (ref 1–4)
LYMPHOCYTES NFR BLD AUTO: 30.4 %
MCH RBC QN AUTO: 30.2 PG (ref 26–34)
MCHC RBC AUTO-ENTMCNC: 33.3 G/DL (ref 31–37)
MCV RBC AUTO: 90.7 FL
MONOCYTES # BLD AUTO: 0.66 X10(3) UL (ref 0.1–1)
MONOCYTES NFR BLD AUTO: 9.7 %
NEUTROPHILS # BLD AUTO: 3.55 X10 (3) UL (ref 1.5–7.7)
NEUTROPHILS # BLD AUTO: 3.55 X10(3) UL (ref 1.5–7.7)
NEUTROPHILS NFR BLD AUTO: 52.1 %
NONHDLC SERPL-MCNC: 95 MG/DL (ref ?–130)
PLATELET # BLD AUTO: 283 10(3)UL (ref 150–450)
RBC # BLD AUTO: 5.04 X10(6)UL
TRIGL SERPL-MCNC: 156 MG/DL (ref 30–149)
VLDLC SERPL CALC-MCNC: 24 MG/DL (ref 0–30)
WBC # BLD AUTO: 6.8 X10(3) UL (ref 4–11)

## 2022-08-04 PROCEDURE — 3075F SYST BP GE 130 - 139MM HG: CPT | Performed by: PHYSICIAN ASSISTANT

## 2022-08-04 PROCEDURE — 80061 LIPID PANEL: CPT | Performed by: INTERNAL MEDICINE

## 2022-08-04 PROCEDURE — 3008F BODY MASS INDEX DOCD: CPT | Performed by: PHYSICIAN ASSISTANT

## 2022-08-04 PROCEDURE — 99214 OFFICE O/P EST MOD 30 MIN: CPT | Performed by: PHYSICIAN ASSISTANT

## 2022-08-04 PROCEDURE — 85025 COMPLETE CBC W/AUTO DIFF WBC: CPT | Performed by: PHYSICIAN ASSISTANT

## 2022-08-04 PROCEDURE — 36415 COLL VENOUS BLD VENIPUNCTURE: CPT | Performed by: INTERNAL MEDICINE

## 2022-08-04 PROCEDURE — 3079F DIAST BP 80-89 MM HG: CPT | Performed by: PHYSICIAN ASSISTANT

## 2022-08-04 NOTE — TELEPHONE ENCOUNTER
Patient is calling to request a hospital follow up appt with PCP only for today, 8/4/22. Patient states he will be travelling starting tomorrow 8/5/22 and will be away for a couple of weeks. Please advise.

## 2022-08-04 NOTE — TELEPHONE ENCOUNTER
PCP is doing Virtual in the afternoon and is not in office  Patient scheduled with PA   Future Appointments   Date Time Provider Debbie Jarvis   8/4/2022  2:00 PM [facility]   8/30/2022 11:30 AM Catina Breaux  [place]    11/3/2022 10:30 AM Isa Mario MD [facility]ann[facility]   11/22/2022 11:00 AM Rafael Quintana MD [address] [place] [address]

## 2022-08-30 ENCOUNTER — OFFICE VISIT (OUTPATIENT)
Dept: SURGERY | Facility: CLINIC | Age: 57
End: 2022-08-30
Payer: MEDICAID

## 2022-08-30 DIAGNOSIS — T14.8XXA OPEN WOUND: ICD-10-CM

## 2022-08-30 DIAGNOSIS — Z98.890 POST-OPERATIVE STATE: Primary | ICD-10-CM

## 2022-08-30 PROCEDURE — 99213 OFFICE O/P EST LOW 20 MIN: CPT | Performed by: SURGERY

## 2022-08-30 NOTE — PROGRESS NOTES
Postoperative Patient Follow-up      8/30/2022    HPI      Va Naranjo is a 61year old male postop after incision and drainage of intersphincteric abscess. He presents now for his first postoperative visit. Culture report was positive for resistant E. Coli.,  Also Bacteroides, para Bacteroides. Completed his course of antibiotics and is back in town from his trip. Patient had recurrence of pain and abscess while traveling in [place] and had a drain placed. Exam  Slight induration in the right intersphincteric groove. No fluctuance or erythema. Assessment/Plan  Assessment   Post-operative state  (primary encounter diagnosis)    Continue warm baths. Antibiotics completed.   Follow-up for any problems         Casandra Hill MD

## 2022-09-02 ENCOUNTER — TELEPHONE (OUTPATIENT)
Dept: CASE MANAGEMENT | Age: 57
End: 2022-09-02

## 2022-09-02 ENCOUNTER — TELEPHONE (OUTPATIENT)
Dept: INTERNAL MEDICINE CLINIC | Facility: CLINIC | Age: 57
End: 2022-09-02

## 2022-09-02 DIAGNOSIS — Z00.8 ENCOUNTER FOR PSYCHOLOGICAL EVALUATION: Primary | ICD-10-CM

## 2022-09-02 NOTE — TELEPHONE ENCOUNTER
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 He received a form from the [place]  stating he needs a psychological evaluation to be fit to carry a weapon. He needs  the evaluation for his Void card which was revoked. He stated he does not know what happened. He stated he does not have a history of psychological problems    He stated this is something new. They told him to follow up with his primary care physician. I spoke to Dr. Anam Butt who does not do this type of form. I called Elmer Aleman who stated he would need to be under a psychologist which he is not. Elmer Aleman stated he needs to follow up with a psychologist.  They gave [facility] phone number who works with this type of cases. [phone] 2012. I called the patient back and the information was given to him. He stated he did follow up with a  yesterday and was told the form needs to be filled out. I gave him Elmer Aleman phone number also for he needs to find a psychiatrist to fill out the form. He stated he will follow up with Elmer Aleman.

## 2022-09-02 NOTE — TELEPHONE ENCOUNTER
Good Morning Dr Jaz Ortiz and staff,    Patient states he called [facility] and they were unable to assist him. He was transferred back to Managed [facility]. Patient called his insurance and they gave him two locations he can go to. Patient would like referral to:    [facility]  [address] [place] [address] 27, [facility]    Phone# [phone]  Fax# [phone]    Please review pended order and add DX codes if you agree with plan of care.     Thank you    [facility]

## 2022-09-03 ENCOUNTER — OFFICE VISIT (OUTPATIENT)
Dept: INTERNAL MEDICINE CLINIC | Facility: CLINIC | Age: 57
End: 2022-09-03
Payer: MEDICAID

## 2022-09-03 VITALS
WEIGHT: 194 LBS | BODY MASS INDEX: 27.77 KG/M2 | DIASTOLIC BLOOD PRESSURE: 86 MMHG | SYSTOLIC BLOOD PRESSURE: 132 MMHG | HEART RATE: 82 BPM | HEIGHT: 70 IN

## 2022-09-03 DIAGNOSIS — Z00.8 ENCOUNTER FOR PSYCHOLOGICAL EVALUATION: Primary | ICD-10-CM

## 2022-09-03 PROCEDURE — 3079F DIAST BP 80-89 MM HG: CPT | Performed by: INTERNAL MEDICINE

## 2022-09-03 PROCEDURE — 3075F SYST BP GE 130 - 139MM HG: CPT | Performed by: INTERNAL MEDICINE

## 2022-09-03 PROCEDURE — 99213 OFFICE O/P EST LOW 20 MIN: CPT | Performed by: INTERNAL MEDICINE

## 2022-09-03 PROCEDURE — 3008F BODY MASS INDEX DOCD: CPT | Performed by: INTERNAL MEDICINE

## 2022-10-05 DIAGNOSIS — E78.00 HYPERCHOLESTEROLEMIA: ICD-10-CM

## 2022-10-06 RX ORDER — ROSUVASTATIN CALCIUM 10 MG/1
10 TABLET, COATED ORAL NIGHTLY
Qty: 90 TABLET | Refills: 1 | Status: SHIPPED | OUTPATIENT
Start: 2022-10-06

## 2022-10-06 NOTE — TELEPHONE ENCOUNTER
Refill passed per [place] [employer] [facility] protocol.    Requested Prescriptions   Pending Prescriptions Disp Refills    ROSUVASTATIN 10 MG Oral Tab [Pharmacy Med Name: ROSUVASTATIN 10MG TABLETS] 90 tablet 1     Sig: TAKE 1 TABLET(10 MG) BY MOUTH EVERY NIGHT        Cholesterol Medication Protocol Passed - 10/5/2022  6:27 PM        Passed - ALT in past 12 months        Passed - LDL in past 12 months        Passed - Last ALT < 80       Lab Results   Component Value Date    ALT 31 07/20/2022             Passed - Last LDL < 130     Lab Results   Component Value Date    LDL 68 08/04/2022               Passed - In person appointment or virtual visit in the past 12 mos or appointment in next 3 mos       Recent Outpatient Visits              1 month ago Encounter for psychological evaluation    Casi Roa MD    Office Visit    1 month ago [facility]-operative [facility]    [facility]    Office Visit    2 months ago Perirectal abscess    [facility], [facility] [address]    Office Visit    2 months ago Rectal pain    Earlene Garcia Floydene Hence, MD    Office Visit    2 months ago Annual physical exam    [facility], [address] [place] [address] Charlie Talamantes MD    Office Visit     Future Appointments         Provider Department Appt Notes    In 4 weeks Julisa Vega MD [place], [address] yearly    In 1 month Katherine Reno MD [facility] IOP check                       Recent Outpatient Visits              1 month ago Encounter for psychological evaluation    Earlene Garcia Floydene Hence, MD    Office Visit    1 month ago Post-operative [facility], [facility], [place], [address] [place] [address] Olivia Penn MD    Office Visit    2 months ago Perirectal abscess Ellen Balbuena [facility]    Office Visit    2 months ago Rectal pain    Earlene Bhardwaj Lonna Carl, MD    Office Visit    2 months ago Annual physical exam    Lonnie Phillips MD    Office Visit            Future Appointments         Provider Department Appt Notes    In 4 weeks Veena Vann MD [place], [address] yearly    In 1 month Rafael Quintana MD [facility]hthal[facility] IOP check

## 2022-10-19 ENCOUNTER — OFFICE VISIT (OUTPATIENT)
Dept: SURGERY | Facility: CLINIC | Age: 57
End: 2022-10-19
Payer: MEDICAID

## 2022-10-19 VITALS — TEMPERATURE: 98 F | WEIGHT: 194 LBS | BODY MASS INDEX: 27.77 KG/M2 | HEIGHT: 70 IN

## 2022-10-19 DIAGNOSIS — K61.1 PERIRECTAL ABSCESS: Primary | ICD-10-CM

## 2022-10-19 PROCEDURE — 3008F BODY MASS INDEX DOCD: CPT | Performed by: SURGERY

## 2022-10-19 PROCEDURE — 99213 OFFICE O/P EST LOW 20 MIN: CPT | Performed by: SURGERY

## 2022-10-26 ENCOUNTER — OFFICE VISIT (OUTPATIENT)
Dept: SURGERY | Facility: CLINIC | Age: 57
End: 2022-10-26
Payer: MEDICAID

## 2022-10-26 VITALS — BODY MASS INDEX: 28 KG/M2 | WEIGHT: 194 LBS

## 2022-10-26 DIAGNOSIS — K60.3 ANAL FISTULA: Primary | ICD-10-CM

## 2022-10-26 PROCEDURE — 99213 OFFICE O/P EST LOW 20 MIN: CPT | Performed by: SURGERY

## 2022-10-26 PROCEDURE — 17250 CHEM CAUT OF GRANLTJ TISSUE: CPT | Performed by: SURGERY

## 2022-10-26 NOTE — PROGRESS NOTES
[facility], [address], [facility] SURGERY    Progress Note    Jose Byers Patient Status:  Specimen    5/10/1965 MRN [id number]   Location [facility], [address], [facility] SURGERY Attending No att. providers found   Hosp Day # 0 PCP Niecy Driscoll MD     Assessment and Plan:     Draining wound. No obvious fistula at this time. Much less granulation tissue recauterized with silver nitrate today  Follow-up as needed    Subjective:   No drainage. Just a scant clear amount just now    Objective:   No data found. Exam: No obvious fistula. 1 mm granuloma cauterized    Results:     Lab Results   Component Value Date    WBC 6.8 2022    HGB 15.2 2022    HCT 45.7 2022    .0 2022    CREATSERUM 0.95 2022    BUN 10 2022     2022    K 4.2 2022     2022    CO2 28.0 2022     (H) 2022    CA 9.1 2022    ALB 3.8 2022    ALKPHO 72 2022    BILT 0.2 2022    TP 7.2 2022    AST 20 2022    ALT 31 2022    T4F 0.92 2016    TSH 1.460 2022    LIP 29 2017    PSA 1.40 2022       No results found.             Gianna Steen MD  10/26/2022

## 2022-11-03 ENCOUNTER — OFFICE VISIT (OUTPATIENT)
Dept: PULMONOLOGY | Facility: CLINIC | Age: 57
End: 2022-11-03
Payer: MEDICAID

## 2022-11-03 VITALS
BODY MASS INDEX: 29.06 KG/M2 | SYSTOLIC BLOOD PRESSURE: 156 MMHG | WEIGHT: 203 LBS | RESPIRATION RATE: 16 BRPM | HEIGHT: 70 IN | OXYGEN SATURATION: 95 % | HEART RATE: 76 BPM | DIASTOLIC BLOOD PRESSURE: 87 MMHG

## 2022-11-03 DIAGNOSIS — Z99.89 OSA ON CPAP: Primary | ICD-10-CM

## 2022-11-03 DIAGNOSIS — G47.33 OSA ON CPAP: Primary | ICD-10-CM

## 2022-11-03 PROCEDURE — 3008F BODY MASS INDEX DOCD: CPT | Performed by: INTERNAL MEDICINE

## 2022-11-03 PROCEDURE — 3077F SYST BP >= 140 MM HG: CPT | Performed by: INTERNAL MEDICINE

## 2022-11-03 PROCEDURE — 99213 OFFICE O/P EST LOW 20 MIN: CPT | Performed by: INTERNAL MEDICINE

## 2022-11-03 PROCEDURE — 3079F DIAST BP 80-89 MM HG: CPT | Performed by: INTERNAL MEDICINE

## 2022-11-03 NOTE — PROGRESS NOTES
The patient is a 65-year-old male who I know well from prior evaluation comes in now for follow-up. His downloaded data on BiPAP is excellent. His average daily usage is 8 hours and 22 minutes and residual events are 2/h. Review of Systems:  Vision normal. Ear nose and throat normal. Bowel normal. Bladder function normal. No depression. No thyroid disease. No lymphatic system concerns. No rash. Muscles and joints unremarkable. No weight loss no weight gain. Physical Examination:  Vital signs normal. HEENT examination is unremarkable with pupils equal round and reactive to light and accommodation. Neck without adenopathy, thyromegaly, JVD nor bruit. Lungs clear to auscultation and percussion. Cardiac regular rate and rhythm no murmur. Abdomen nontender, without hepatosplenomegaly and no mass appreciable. Extremities and Musculoskeletal without clubbing cyanosis nor edema, and mobility acceptable. Neurologic grossly intact with symmetric tone and strength and reflex. Assessment and plan:  1. Obstructive sleep apnea-excellent control. He is benefiting from ongoing usage of the BiPAP. Recommendations: Vigilance with BiPAP every night all night, weight loss, avoid alcohol, avoid sedating drug, never drive if sleepy, see me in the office at the 1 year interval or sooner if needed and contact me promptly if any new troubles.

## 2022-11-29 ENCOUNTER — OFFICE VISIT (OUTPATIENT)
Dept: OPHTHALMOLOGY | Facility: CLINIC | Age: 57
End: 2022-11-29
Payer: MEDICAID

## 2022-11-29 DIAGNOSIS — H40.1332 PIGMENTARY GLAUCOMA OF BOTH EYES, MODERATE STAGE: Primary | ICD-10-CM

## 2022-11-29 PROCEDURE — 99213 OFFICE O/P EST LOW 20 MIN: CPT | Performed by: OPHTHALMOLOGY

## 2022-11-29 RX ORDER — LATANOPROST 50 UG/ML
1 SOLUTION/ DROPS OPHTHALMIC NIGHTLY
Qty: 3 EACH | Refills: 3 | Status: SHIPPED | OUTPATIENT
Start: 2022-11-29

## 2022-11-29 NOTE — PATIENT INSTRUCTIONS
Pigmentary glaucoma of both eyes, moderate stage  IOP is stable. Continue Latanoprost at bedtime in both eyes.    We will have patient back in 4 months for a visual field, OCT and EE

## 2022-11-29 NOTE — ASSESSMENT & PLAN NOTE
IOP is stable. Continue Latanoprost at bedtime in both eyes.    We will have patient back in 4 months for a visual field, OCT and EE

## 2022-12-19 ENCOUNTER — OFFICE VISIT (OUTPATIENT)
Dept: SURGERY | Facility: CLINIC | Age: 57
End: 2022-12-19
Payer: MEDICAID

## 2022-12-19 VITALS — HEIGHT: 70 IN | WEIGHT: 203 LBS | BODY MASS INDEX: 29.06 KG/M2

## 2022-12-19 DIAGNOSIS — K60.3 ANAL FISTULA: Primary | ICD-10-CM

## 2022-12-19 PROCEDURE — 3008F BODY MASS INDEX DOCD: CPT | Performed by: SURGERY

## 2022-12-19 PROCEDURE — 99213 OFFICE O/P EST LOW 20 MIN: CPT | Performed by: SURGERY

## 2022-12-19 RX ORDER — AMOXICILLIN AND CLAVULANATE POTASSIUM 875; 125 MG/1; MG/1
1 TABLET, FILM COATED ORAL 2 TIMES DAILY
Qty: 14 TABLET | Refills: 0 | Status: SHIPPED | OUTPATIENT
Start: 2022-12-19

## 2022-12-19 NOTE — PATIENT INSTRUCTIONS
Plan exam under anesthesia, fistulogram with possible intervention after the new year.     Augmentin 875 mg twice daily if needed in the interim

## 2022-12-19 NOTE — PROGRESS NOTES
Established Patient Follow-up      12/19/2022    Tanisha Cluster 61year old      HPI  Patient presents with:  Post-Op: Patient here for post op anal fistula and surgery. Patient reports recurrent intermittent clear fluid. Complaints of intermittent drainage from his prior I&D site. Sounds suspicious for a anal fistula      Exam  Site is identified at 5:00 in prone position. No obvious granulation or discharge. Possible small fistula      Assessment/Plan  Assessment   Anal fistula  (primary encounter diagnosis)    Plan exam under anesthesia, fistulogram with peroxide possible fistulotomy versus seton placement.        Casa Singletary MD

## 2023-01-03 ENCOUNTER — LAB ENCOUNTER (OUTPATIENT)
Dept: LAB | Facility: HOSPITAL | Age: 58
End: 2023-01-03
Attending: SURGERY
Payer: MEDICAID

## 2023-01-03 DIAGNOSIS — Z01.818 PREOP TESTING: ICD-10-CM

## 2023-01-03 LAB — SARS-COV-2 RNA RESP QL NAA+PROBE: NOT DETECTED

## 2023-01-05 ENCOUNTER — HOSPITAL ENCOUNTER (OUTPATIENT)
Facility: HOSPITAL | Age: 58
Setting detail: HOSPITAL OUTPATIENT SURGERY
Discharge: HOME | End: 2023-01-05
Attending: SURGERY | Admitting: SURGERY
Payer: MEDICAID

## 2023-01-05 ENCOUNTER — ANESTHESIA (OUTPATIENT)
Dept: SURGERY | Facility: HOSPITAL | Age: 58
End: 2023-01-05
Payer: MEDICAID

## 2023-01-05 ENCOUNTER — ANESTHESIA EVENT (OUTPATIENT)
Dept: SURGERY | Facility: HOSPITAL | Age: 58
End: 2023-01-05
Payer: MEDICAID

## 2023-01-05 VITALS
DIASTOLIC BLOOD PRESSURE: 76 MMHG | OXYGEN SATURATION: 98 % | TEMPERATURE: 98 F | BODY MASS INDEX: 28.77 KG/M2 | SYSTOLIC BLOOD PRESSURE: 132 MMHG | RESPIRATION RATE: 16 BRPM | HEIGHT: 70 IN | WEIGHT: 201 LBS | HEART RATE: 63 BPM

## 2023-01-05 DIAGNOSIS — K60.3 ANAL FISTULA: ICD-10-CM

## 2023-01-05 DIAGNOSIS — Z01.818 PREOP TESTING: Primary | ICD-10-CM

## 2023-01-05 LAB — GLUCOSE BLDC GLUCOMTR-MCNC: 119 MG/DL (ref 70–99)

## 2023-01-05 PROCEDURE — 0DJD8ZZ INSPECTION OF LOWER INTESTINAL TRACT, VIA NATURAL OR ARTIFICIAL OPENING ENDOSCOPIC: ICD-10-PCS | Performed by: SURGERY

## 2023-01-05 PROCEDURE — 46230 REMOVAL OF ANAL TAGS: CPT | Performed by: SURGERY

## 2023-01-05 PROCEDURE — 0DBQXZZ EXCISION OF ANUS, EXTERNAL APPROACH: ICD-10-PCS | Performed by: SURGERY

## 2023-01-05 RX ORDER — SODIUM CHLORIDE, SODIUM LACTATE, POTASSIUM CHLORIDE, CALCIUM CHLORIDE 600; 310; 30; 20 MG/100ML; MG/100ML; MG/100ML; MG/100ML
INJECTION, SOLUTION INTRAVENOUS CONTINUOUS
Status: DISCONTINUED | OUTPATIENT
Start: 2023-01-05 | End: 2023-01-05

## 2023-01-05 RX ORDER — DEXTROSE MONOHYDRATE 25 G/50ML
50 INJECTION, SOLUTION INTRAVENOUS
Status: DISCONTINUED | OUTPATIENT
Start: 2023-01-05 | End: 2023-01-05

## 2023-01-05 RX ORDER — MORPHINE SULFATE 10 MG/ML
6 INJECTION, SOLUTION INTRAMUSCULAR; INTRAVENOUS EVERY 10 MIN PRN
Status: DISCONTINUED | OUTPATIENT
Start: 2023-01-05 | End: 2023-01-05

## 2023-01-05 RX ORDER — NICOTINE POLACRILEX 4 MG
15 LOZENGE BUCCAL
Status: DISCONTINUED | OUTPATIENT
Start: 2023-01-05 | End: 2023-01-05 | Stop reason: HOSPADM

## 2023-01-05 RX ORDER — HYDROMORPHONE HYDROCHLORIDE 1 MG/ML
0.4 INJECTION, SOLUTION INTRAMUSCULAR; INTRAVENOUS; SUBCUTANEOUS EVERY 5 MIN PRN
Status: DISCONTINUED | OUTPATIENT
Start: 2023-01-05 | End: 2023-01-05

## 2023-01-05 RX ORDER — NICOTINE POLACRILEX 4 MG
30 LOZENGE BUCCAL
Status: DISCONTINUED | OUTPATIENT
Start: 2023-01-05 | End: 2023-01-05 | Stop reason: HOSPADM

## 2023-01-05 RX ORDER — DEXAMETHASONE SODIUM PHOSPHATE 4 MG/ML
VIAL (ML) INJECTION AS NEEDED
Status: DISCONTINUED | OUTPATIENT
Start: 2023-01-05 | End: 2023-01-05 | Stop reason: SURG

## 2023-01-05 RX ORDER — MORPHINE SULFATE 4 MG/ML
4 INJECTION, SOLUTION INTRAMUSCULAR; INTRAVENOUS EVERY 10 MIN PRN
Status: DISCONTINUED | OUTPATIENT
Start: 2023-01-05 | End: 2023-01-05

## 2023-01-05 RX ORDER — MORPHINE SULFATE 4 MG/ML
2 INJECTION, SOLUTION INTRAMUSCULAR; INTRAVENOUS EVERY 10 MIN PRN
Status: DISCONTINUED | OUTPATIENT
Start: 2023-01-05 | End: 2023-01-05

## 2023-01-05 RX ORDER — ACETAMINOPHEN 500 MG
1000 TABLET ORAL ONCE
Status: COMPLETED | OUTPATIENT
Start: 2023-01-05 | End: 2023-01-05

## 2023-01-05 RX ORDER — LIDOCAINE HYDROCHLORIDE 10 MG/ML
INJECTION, SOLUTION EPIDURAL; INFILTRATION; INTRACAUDAL; PERINEURAL AS NEEDED
Status: DISCONTINUED | OUTPATIENT
Start: 2023-01-05 | End: 2023-01-05 | Stop reason: SURG

## 2023-01-05 RX ORDER — HYDROMORPHONE HYDROCHLORIDE 1 MG/ML
0.6 INJECTION, SOLUTION INTRAMUSCULAR; INTRAVENOUS; SUBCUTANEOUS EVERY 5 MIN PRN
Status: DISCONTINUED | OUTPATIENT
Start: 2023-01-05 | End: 2023-01-05

## 2023-01-05 RX ORDER — IBUPROFEN 600 MG/1
600 TABLET ORAL EVERY 6 HOURS PRN
Qty: 15 TABLET | Refills: 1 | Status: SHIPPED | OUTPATIENT
Start: 2023-01-05 | End: 2023-01-12

## 2023-01-05 RX ORDER — HYDROMORPHONE HYDROCHLORIDE 1 MG/ML
0.2 INJECTION, SOLUTION INTRAMUSCULAR; INTRAVENOUS; SUBCUTANEOUS EVERY 5 MIN PRN
Status: DISCONTINUED | OUTPATIENT
Start: 2023-01-05 | End: 2023-01-05

## 2023-01-05 RX ORDER — DEXTROSE MONOHYDRATE 25 G/50ML
50 INJECTION, SOLUTION INTRAVENOUS
Status: DISCONTINUED | OUTPATIENT
Start: 2023-01-05 | End: 2023-01-05 | Stop reason: HOSPADM

## 2023-01-05 RX ORDER — NALOXONE HYDROCHLORIDE 0.4 MG/ML
80 INJECTION, SOLUTION INTRAMUSCULAR; INTRAVENOUS; SUBCUTANEOUS AS NEEDED
Status: DISCONTINUED | OUTPATIENT
Start: 2023-01-05 | End: 2023-01-05

## 2023-01-05 RX ORDER — IBUPROFEN 600 MG/1
600 TABLET ORAL ONCE
Status: COMPLETED | OUTPATIENT
Start: 2023-01-05 | End: 2023-01-05

## 2023-01-05 RX ORDER — BUPIVACAINE HYDROCHLORIDE AND EPINEPHRINE 2.5; 5 MG/ML; UG/ML
INJECTION, SOLUTION INFILTRATION; PERINEURAL AS NEEDED
Status: DISCONTINUED | OUTPATIENT
Start: 2023-01-05 | End: 2023-01-05 | Stop reason: HOSPADM

## 2023-01-05 RX ORDER — NICOTINE POLACRILEX 4 MG
15 LOZENGE BUCCAL
Status: DISCONTINUED | OUTPATIENT
Start: 2023-01-05 | End: 2023-01-05

## 2023-01-05 RX ORDER — NICOTINE POLACRILEX 4 MG
30 LOZENGE BUCCAL
Status: DISCONTINUED | OUTPATIENT
Start: 2023-01-05 | End: 2023-01-05

## 2023-01-05 RX ORDER — CEFAZOLIN SODIUM/WATER 2 G/20 ML
2 SYRINGE (ML) INTRAVENOUS ONCE
Status: COMPLETED | OUTPATIENT
Start: 2023-01-05 | End: 2023-01-05

## 2023-01-05 RX ORDER — ONDANSETRON 2 MG/ML
INJECTION INTRAMUSCULAR; INTRAVENOUS AS NEEDED
Status: DISCONTINUED | OUTPATIENT
Start: 2023-01-05 | End: 2023-01-05 | Stop reason: SURG

## 2023-01-05 RX ADMIN — ONDANSETRON 4 MG: 2 INJECTION INTRAMUSCULAR; INTRAVENOUS at 13:34:00

## 2023-01-05 RX ADMIN — CEFAZOLIN SODIUM/WATER 2 G: 2 G/20 ML SYRINGE (ML) INTRAVENOUS at 13:41:00

## 2023-01-05 RX ADMIN — LIDOCAINE HYDROCHLORIDE 50 MG: 10 INJECTION, SOLUTION EPIDURAL; INFILTRATION; INTRACAUDAL; PERINEURAL at 13:34:00

## 2023-01-05 RX ADMIN — SODIUM CHLORIDE, SODIUM LACTATE, POTASSIUM CHLORIDE, CALCIUM CHLORIDE: 600; 310; 30; 20 INJECTION, SOLUTION INTRAVENOUS at 14:01:00

## 2023-01-05 RX ADMIN — DEXAMETHASONE SODIUM PHOSPHATE 4 MG: 4 MG/ML VIAL (ML) INJECTION at 13:34:00

## 2023-01-05 NOTE — ANESTHESIA PROCEDURE NOTES
Airway  Date/Time: 1/5/2023 1:38 PM  Urgency: Elective    Airway not difficult    General Information and Staff    Patient location during procedure: OR  Anesthesiologist: Basil Pham MD  Resident/CRNA: Earl Osborne CRNA  Performed: CRNA     Indications and Patient Condition  Indications for airway management: anesthesia  Sedation level: deep  Preoxygenated: yes  Patient position: sniffing  Mask difficulty assessment: 1 - vent by mask    Final Airway Details  Final airway type: supraglottic airway      Successful airway: classic  Size 5       Number of attempts at approach: 1  Number of other approaches attempted: 0    Additional Comments  Attempted 4 LMA first and could not get a good seal. Tried 5 and it had good seal

## 2023-01-05 NOTE — INTERVAL H&P NOTE
Pre-op Diagnosis: Anal fistula [K60.3]    The above referenced H&P was reviewed by Darby Mireles MD on 1/5/2023, the patient was examined and no significant changes have occurred in the patient's condition since the H&P was performed. I discussed with the patient and/or legal representative the potential benefits, risks and side effects of this procedure; the likelihood of the patient achieving goals; and potential problems that might occur during recuperation. I discussed reasonable alternatives to the procedure, including risks, benefits and side effects related to the alternatives and risks related to not receiving this procedure. We will proceed with procedure as planned. Patient now with continued drainage from the site of I&D. Possible anal rectal fistula. He agrees to [facility] anesthesia and intervention.

## 2023-01-05 NOTE — ANESTHESIA POSTPROCEDURE EVALUATION
Patient: Katherine Martinez    Procedure Summary     Date: 01/05/23 Room / Location: [facility] MAIN OR 02 / [facility] MAIN OR    Anesthesia Start: 2645 Anesthesia Stop: 1579    Procedure: Exam under anesthesia, excision of anal lesion x 2  (Anus) Diagnosis:       Anal fistula      (Anal fistula [K60.3])    Surgeons: Liane Morton MD Anesthesiologist: Lucy Mendoza MD    Anesthesia Type: general ASA Status: 2          Anesthesia Type: general    Vitals Value Taken Time   /77 01/05/23 1401   Temp 97 01/05/23 1401   Pulse 70 01/05/23 1401   Resp 16 01/05/23 1401   SpO2 94 01/05/23 1401       [facility] AN Post Evaluation:   Patient Evaluated in PACU  Patient Participation: complete - patient participated  Level of Consciousness: awake  Pain Score: 0  Pain Management: adequate  Airway Patency:patent  Dental exam unchanged from preop  Yes    Cardiovascular Status: acceptable  Respiratory Status: acceptable  Postoperative Hydration acceptable      Loree Hernandez CRNA  1/5/2023 2:01 PM

## 2023-01-05 NOTE — OPERATIVE REPORT
[facility]    PATIENT'S NAME: [facility]   ATTENDING PHYSICIAN: Sunny Farris MD   OPERATING PHYSICIAN: Sunny Farris MD   PATIENT ACCOUNT#:   [de-identified]    LOCATION:  [facility] 13 [facility] 10  MEDICAL RECORD #:   [id number]       YOB: 1965  ADMISSION DATE:       01/05/2023      OPERATION DATE:  01/05/2023    OPERATIVE REPORT      PREOPERATIVE DIAGNOSIS:  Anal drainage, possible fistula, post drainage of intersphincteric abscess. POSTOPERATIVE DIAGNOSIS:  No evidence of fistula. PROCEDURE:  Exam under anesthesia, fistulogram, excision of anal lesion x2. ASSISTANT:  SUSANA Perry. ESTIMATED BLOOD LOSS:  Minimal.    COMPLICATIONS:  None. ANESTHESIA:  General.    DISPOSITION:  To Recovery, tolerated well. INDICATIONS:  Patient is a 59-year-old male who had a I and D of an intersphincteric abscess in August of this year. He was seen in the office several times with complaints of clear drainage from the site of his I and D. I could not clearly identify a fistula. He agrees to exam under anesthesia. Consent obtained. OPERATIVE TECHNIQUE:  He is taken to surgery, placed in lithotomy. He is prepped and draped in usual sterile fashion. The anoscope is inserted. The site of I and D is identified at 10 o'clock on the right. There is no granulation tissue or obvious fistula. The area is gently probed and I cannot advance the anal probe. Using a 20-gauge Angiocath, peroxide is injected into the tract and there is no evidence of a fistula. This external area of chronic inflammation is excised using a 15 blade and closed using interrupted 3-0 chromic. A hypertrophied anal papilla on the left at 3 o'clock is excised with cautery. Marcaine infiltrated for local block. Dibucaine and Gelfoam were placed. He tolerated this well.      Dictated By Sunny Farris MD  d: 01/05/2023 13:59:50  t: 01/05/2023 16:57:45  Job [id number]  CD/    cc: Christopher Bowens MD

## 2023-01-06 ENCOUNTER — TELEPHONE (OUTPATIENT)
Dept: SURGERY | Facility: CLINIC | Age: 58
End: 2023-01-06

## 2023-01-06 NOTE — TELEPHONE ENCOUNTER
Made appointment with Dr. Anna Marie Huang for Tuesday 1/10 at 11:00 am to discuss results of surgery.

## 2023-01-06 NOTE — TELEPHONE ENCOUNTER
Patient called still in pain. Called office and Sally Sandoval mentioned she would transfer to [place] and was on hold and call disconnected. Patient was transferred to Answering service.

## 2023-01-10 ENCOUNTER — OFFICE VISIT (OUTPATIENT)
Dept: SURGERY | Facility: CLINIC | Age: 58
End: 2023-01-10
Payer: MEDICAID

## 2023-01-10 DIAGNOSIS — Z98.890 POST-OPERATIVE STATE: Primary | ICD-10-CM

## 2023-01-10 PROCEDURE — 99024 POSTOP FOLLOW-UP VISIT: CPT | Performed by: SURGERY

## 2023-01-10 NOTE — PROGRESS NOTES
Postoperative Patient Follow-up      1/10/2023    DESHAUN Jones is a 61year old male postop after exam under anesthesia and fistulogram.  No anal fistula was identified. I excised the external orifice and biopsy shows chronic inflammation. This was closed with chromic suture which is open. Minimal drainage from the wound. I explained this will take 2 to 3 weeks to completely heal.      Exam  Wound healing as expected. Assessment/Plan  Assessment   Post-operative state  (primary encounter diagnosis)    2 to 3 weeks for complete healing.   Follow-up for problems         Cathy Plunkett MD

## 2023-01-12 RX ORDER — LATANOPROST 50 UG/ML
1 SOLUTION/ DROPS OPHTHALMIC NIGHTLY
Qty: 7.5 ML | Refills: 3 | Status: SHIPPED | OUTPATIENT
Start: 2023-01-12

## 2023-01-12 NOTE — TELEPHONE ENCOUNTER
LDE: 3/22/22  Last visit: 11/29/22  Due for: VF, OCT and EE   Upcoming visit: 1/17/23    Routed to [facility]

## 2023-01-14 ENCOUNTER — TELEPHONE (OUTPATIENT)
Dept: OPHTHALMOLOGY | Facility: CLINIC | Age: 58
End: 2023-01-14

## 2023-01-14 NOTE — TELEPHONE ENCOUNTER
Spoke with patient. I moved the VF, OCT and dilated eye exam to 3/30/23. He wants to keep his appointment for 1/17/23 due he wants an evaluation to rule out NORMAN. He says that he thinks he may have NORMAN because his eyes are red, dry and he has double vision at times (sees horizontal overlapping images). He does not have Thyroid Disease, but says his mom has Thyroid disease.

## 2023-01-17 ENCOUNTER — OFFICE VISIT (OUTPATIENT)
Dept: OPHTHALMOLOGY | Facility: CLINIC | Age: 58
End: 2023-01-17

## 2023-01-17 DIAGNOSIS — H11.823 CONJUNCTIVOCHALASIS OF BOTH EYES: ICD-10-CM

## 2023-01-17 DIAGNOSIS — H04.203 TEARING EYES: Primary | ICD-10-CM

## 2023-01-17 PROCEDURE — 99213 OFFICE O/P EST LOW 20 MIN: CPT | Performed by: OPHTHALMOLOGY

## 2023-01-17 NOTE — PATIENT INSTRUCTIONS
Tearing eyes  Recommend warm compresses 2 times a day.   Refer to Dr. Singleton [place] of both eyes  Will refer to Dr. Candi Steel for an evaluation and possible treatment

## 2023-04-04 ENCOUNTER — OFFICE VISIT (OUTPATIENT)
Dept: OPHTHALMOLOGY | Facility: CLINIC | Age: 58
End: 2023-04-04

## 2023-04-04 DIAGNOSIS — H40.1332 PIGMENTARY GLAUCOMA OF BOTH EYES, MODERATE STAGE: Primary | ICD-10-CM

## 2023-04-04 DIAGNOSIS — H25.13 AGE-RELATED NUCLEAR CATARACT OF BOTH EYES: ICD-10-CM

## 2023-04-04 DIAGNOSIS — H43.393 VITREOUS FLOATERS OF BOTH EYES: ICD-10-CM

## 2023-04-04 PROCEDURE — 92133 CPTRZD OPH DX IMG PST SGM ON: CPT | Performed by: OPHTHALMOLOGY

## 2023-04-04 PROCEDURE — 92083 EXTENDED VISUAL FIELD XM: CPT | Performed by: OPHTHALMOLOGY

## 2023-04-04 PROCEDURE — 92014 COMPRE OPH EXAM EST PT 1/>: CPT | Performed by: OPHTHALMOLOGY

## 2023-04-04 NOTE — ASSESSMENT & PLAN NOTE
Discussed moderate cataracts in both eyes that are not affecting vision and are not surgical at this time. Continue same glasses.

## 2023-04-04 NOTE — ASSESSMENT & PLAN NOTE
Visual field and OCT completed in office today with stable results that were discussed with patient in office today. IOP is stable. Continue Latanoprost at bedtime in both eyes. We will have patient back in 4 months for a pressure check.

## 2023-04-04 NOTE — PATIENT INSTRUCTIONS
Pigmentary glaucoma of both eyes, moderate stage  Visual field and OCT completed in office today with stable results that were discussed with patient in office today. IOP is stable. Continue Latanoprost at bedtime in both eyes. We will have patient back in 4 months for a pressure check. Age-related nuclear cataract of both eyes  Discussed moderate cataracts in both eyes that are not affecting vision and are not surgical at this time. Continue same glasses. Vitreous floaters of both eyes  No treatment.

## 2023-06-29 ENCOUNTER — TELEPHONE (OUTPATIENT)
Facility: CLINIC | Age: 58
End: 2023-06-29

## 2023-06-29 NOTE — TELEPHONE ENCOUNTER
Patient outreach message received:    Recall for _CLN___per __Stathopoulos___in __3 years____  Last [facility]:8-  Next [facility]:8-    Recall reminder letter mailed out to patient.

## 2023-07-19 ENCOUNTER — TELEPHONE (OUTPATIENT)
Facility: CLINIC | Age: 58
End: 2023-07-19

## 2023-07-19 DIAGNOSIS — Z86.010 PERSONAL HISTORY OF COLONIC POLYPS: Primary | ICD-10-CM

## 2023-07-19 RX ORDER — POLYETHYLENE GLYCOL 3350, SODIUM CHLORIDE, SODIUM BICARBONATE, POTASSIUM CHLORIDE 420; 11.2; 5.72; 1.48 G/4L; G/4L; G/4L; G/4L
4 POWDER, FOR SOLUTION ORAL ONCE
Qty: 4000 ML | Refills: 0 | Status: SHIPPED | OUTPATIENT
Start: 2023-07-19 | End: 2023-07-19

## 2023-07-19 NOTE — TELEPHONE ENCOUNTER
GI Schedulers    Please call the patient to schedule for colonoscopy    See MD orders below    Thanks

## 2023-07-20 NOTE — TELEPHONE ENCOUNTER
Scheduled for:  Colonoscopy [id number]  Provider Name:  Dr Randolph Mcdaniel  Date:  9/27/2023  Location:  [facility]  Sedation:  MAC  Time:  11:00 am. (pt is aware to arrive at 10:00 am)  Prep:  Split dose Trilyte. Meds/Allergies Reconciled?:  Yes  Diagnosis with codes:  H/O Colon Polyps Z86.010  Was patient informed to call insurance with codes (Y/N):  Yes   Referral sent?:  Referral was sent at the time of electronic surgical scheduling. [facility] or [id number] notified?:  I sent an electronic request to Endo Scheduling and received a confirmation today. Medication Orders:  Pt is aware to NOT take iron pills, herbal meds and diet supplements for 7 days before exam. Also to NOT take any form of alcohol, recreational drugs and any forms of ED meds 24 hours before exam.   Misc Orders:  N/A   Further instructions given by staff:  I discussed the prep instructions with the patient which he verbally understood. I will send prep instructions via My Chart.

## 2023-07-28 ENCOUNTER — TELEPHONE (OUTPATIENT)
Dept: OPHTHALMOLOGY | Facility: CLINIC | Age: 58
End: 2023-07-28

## 2023-08-08 ENCOUNTER — OFFICE VISIT (OUTPATIENT)
Dept: OPHTHALMOLOGY | Facility: CLINIC | Age: 58
End: 2023-08-08

## 2023-08-08 DIAGNOSIS — H40.1332 PIGMENTARY GLAUCOMA OF BOTH EYES, MODERATE STAGE: Primary | ICD-10-CM

## 2023-08-08 PROCEDURE — 99213 OFFICE O/P EST LOW 20 MIN: CPT | Performed by: OPHTHALMOLOGY

## 2023-08-08 NOTE — PROGRESS NOTES
Jose Byers is a 60year old male. HPI:     HPI    Pt here for IOP check. Pt is taking Latanoprost both eyes at bedtime as directed. Pt states vision is stable and denies any ocular complaints. Last edited by Karri Adorno O.T. on 8/8/2023 11:36 AM.        Patient History:  Past Medical History:   Diagnosis Date    Age-related nuclear cataract of both eyes 02/09/2016    Diabetes mellitus type 2 without retinopathy ([address] 02/09/2016    Esophageal reflux     Glaucoma 10/31/2015    started on Latanoprost qhs OS after abnormal OCT result 10/31/15    High blood pressure     High cholesterol     Lipid screening 06/24/2014    Lumbar herniated disc 2010    Physical Therapy     MANAN on CPAP 12/17/2020    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    Pigmentary glaucoma of both eyes, moderate stage 02/09/2016 1/30/2020- pt had been lost to follow up since 2016 and came in with IOP of 27 OU and abnormal VF and OCT OS- and was restarted on Latanoprost OS and started Latanoprost OD due to [address]    Sleep apnea     uses cpap on andoff       Surgical History: Jose Byers has a past surgical history that includes electrocardiogram, complete (02-) (Scanned to media tab ) and colonoscopy (N/A, 9/15/2020) (Procedure: COLONOSCOPY;  Surgeon: Jean Paul Mora MD;  Location: [facility]). Family History   Problem Relation Age of Onset    Heart Disease Mother     Heart Disease Maternal Grandmother     Diabetes Neg     Glaucoma Neg     Macular degeneration Neg        Social History:   Social History     Socioeconomic History    Marital status:    Tobacco Use    Smoking status: Never    Smokeless tobacco: Never   Vaping Use    Vaping Use: Never used   Substance and Sexual Activity    Alcohol use:  Yes     Alcohol/week: 0.0 standard drinks of alcohol     Comment:  1-2 3-4 xx  a wk    Drug use: No   Other Topics Concern    Caffeine Concern Yes     Comment: Daily; coffee, 1 cup     Reaction to local anesthetic No       Medications:  Current Outpatient Medications   Medication Sig Dispense Refill    latanoprost 0.005 % Ophthalmic Solution Place 1 drop into both eyes nightly. Instill 1 drop by ophthalmic route every night into both eyes 7.5 mL 3    rosuvastatin 10 MG Oral Tab Take 1 tablet (10 mg total) by mouth nightly. (Patient not taking: Reported on 7/19/2023) 90 tablet 3       Allergies:    Calamari Oil [Squid*    ITCHING, SWELLING  Grass                   Runny nose  Ragweed                 ITCHING    ROS:       PHYSICAL EXAM:     Base Eye Exam       Visual Acuity (Snellen - Linear)         Right Left    Dist sc 20/30 -1 20/25 +2    Dist ph sc 20/25 +2     Near cc 20/20 20/20      Correction: Glasses              Tonometry (Applanation, 11:33 AM)         Right Left    Pressure 14 14              Pachymetry (09/10/2013)         Right Left    Thickness 530/+1 539/+1/2              Pupils         Pupils    Right PERRL    Left PERRL              Neuro/Psych       Oriented x3:  Yes                  Slit Lamp and Fundus Exam       Slit Lamp Exam         Right Left    Lids/Lashes Dermatochalasis, Meibomian gland dysfunction, Puncta normal upper and lower Dermatochalasis, Meibomian gland dysfunction, Puncta normal upper and lower    Conjunctiva/Sclera Trace Injection, Conjunctivochalasis Trace Injection, Conjunctivochalasis    Cornea 1+ K spindles 2+ K spindles    Anterior Chamber Deep and quiet Deep and quiet    Iris Transillumination defects 2, 4, 6 subtle Transillumination defects 360 degrees               Fundus Exam         Right Left    Disc Good rim thin inferior rim     C/D Ratio 0.3 0.6                  Refraction       Wearing Rx         Sphere Cylinder    Right +1.50 Sphere    Left +1.50 Sphere      Type: forgot-Distance only              Wearing Rx #2         Sphere Cylinder    Right +2.50 Sphere    Left +2.50 Sphere      Type: OTC reading only                     ASSESSMENT/PLAN:     Diagnoses and Plan: Pigmentary glaucoma of both eyes, moderate stage  IOP is stable. Continue Latanoprost at bedtime in both eyes. We will have patient back in 4 months for a pressure check    No orders of the defined types were placed in this encounter. Meds This Visit:  Requested Prescriptions      No prescriptions requested or ordered in this encounter        Follow up instructions:  Return in about 4 months (around 12/8/2023) for IOP check.     8/8/2023  Scribed by: Minal Peng MD

## 2023-08-08 NOTE — PATIENT INSTRUCTIONS
Pigmentary glaucoma of both eyes, moderate stage  IOP is stable. Continue Latanoprost at bedtime in both eyes.      We will have patient back in 4 months for a pressure check

## 2023-09-01 ENCOUNTER — HOSPITAL ENCOUNTER (INPATIENT)
Facility: HOSPITAL | Age: 58
LOS: 1 days | Discharge: HOME | End: 2023-09-02
Attending: EMERGENCY MEDICINE | Admitting: HOSPITALIST
Payer: MEDICAID

## 2023-09-01 ENCOUNTER — APPOINTMENT (OUTPATIENT)
Dept: INTERVENTIONAL RADIOLOGY/VASCULAR | Facility: HOSPITAL | Age: 58
End: 2023-09-01
Attending: INTERNAL MEDICINE
Payer: MEDICAID

## 2023-09-01 ENCOUNTER — APPOINTMENT (OUTPATIENT)
Dept: GENERAL RADIOLOGY | Facility: HOSPITAL | Age: 58
End: 2023-09-01
Payer: MEDICAID

## 2023-09-01 ENCOUNTER — NURSE TRIAGE (OUTPATIENT)
Dept: INTERNAL MEDICINE CLINIC | Facility: CLINIC | Age: 58
End: 2023-09-01

## 2023-09-01 ENCOUNTER — HOSPITAL ENCOUNTER (OUTPATIENT)
Facility: HOSPITAL | Age: 58
Setting detail: OBSERVATION
Discharge: HOME | End: 2023-09-02
Attending: EMERGENCY MEDICINE | Admitting: HOSPITALIST
Payer: MEDICAID

## 2023-09-01 DIAGNOSIS — I20.8 STABLE ANGINA (HCC): Primary | ICD-10-CM

## 2023-09-01 DIAGNOSIS — I21.4 NSTEMI (NON-ST ELEVATED MYOCARDIAL INFARCTION) (HCC): ICD-10-CM

## 2023-09-01 PROBLEM — I20.89 STABLE ANGINA (HCC): Status: ACTIVE | Noted: 2023-09-01

## 2023-09-01 PROBLEM — I20.89 STABLE ANGINA: Status: ACTIVE | Noted: 2023-09-01

## 2023-09-01 LAB
ALBUMIN SERPL-MCNC: 4.1 G/DL (ref 3.4–5)
ALBUMIN/GLOB SERPL: 1.2 {RATIO} (ref 1–2)
ALP LIVER SERPL-CCNC: 72 U/L
ALT SERPL-CCNC: 61 U/L
ANION GAP SERPL CALC-SCNC: 7 MMOL/L (ref 0–18)
APTT PPP: 27.8 SECONDS (ref 23.3–35.6)
APTT PPP: 35.4 SECONDS (ref 23.3–35.6)
AST SERPL-CCNC: 32 U/L (ref 15–37)
ATRIAL RATE: 87 BPM
BASOPHILS # BLD AUTO: 0.07 X10(3) UL (ref 0–0.2)
BASOPHILS NFR BLD AUTO: 0.8 %
BILIRUB SERPL-MCNC: 0.7 MG/DL (ref 0.1–2)
BUN BLD-MCNC: 13 MG/DL (ref 7–18)
BUN/CREAT SERPL: 12 (ref 10–20)
CALCIUM BLD-MCNC: 9.3 MG/DL (ref 8.5–10.1)
CHLORIDE SERPL-SCNC: 104 MMOL/L (ref 98–112)
CHOLEST SERPL-MCNC: 235 MG/DL (ref ?–200)
CO2 SERPL-SCNC: 27 MMOL/L (ref 21–32)
CREAT BLD-MCNC: 1.08 MG/DL
D DIMER PPP FEU-MCNC: 0.35 UG/ML FEU (ref ?–0.59)
DEPRECATED RDW RBC AUTO: 38 FL (ref 35.1–46.3)
EGFRCR SERPLBLD CKD-EPI 2021: 79 ML/MIN/1.73M2 (ref 60–?)
EOSINOPHIL # BLD AUTO: 0.78 X10(3) UL (ref 0–0.7)
EOSINOPHIL NFR BLD AUTO: 9.2 %
ERYTHROCYTE [DISTWIDTH] IN BLOOD BY AUTOMATED COUNT: 12.1 % (ref 11–15)
GLOBULIN PLAS-MCNC: 3.4 G/DL (ref 2.8–4.4)
GLUCOSE BLD-MCNC: 155 MG/DL (ref 70–99)
HCT VFR BLD AUTO: 47.7 %
HDLC SERPL-MCNC: 29 MG/DL (ref 40–59)
HGB BLD-MCNC: 17 G/DL
IMM GRANULOCYTES # BLD AUTO: 0.03 X10(3) UL (ref 0–1)
IMM GRANULOCYTES NFR BLD: 0.4 %
LDLC SERPL CALC-MCNC: 160 MG/DL (ref ?–100)
LYMPHOCYTES # BLD AUTO: 2.33 X10(3) UL (ref 1–4)
LYMPHOCYTES NFR BLD AUTO: 27.6 %
MAGNESIUM SERPL-MCNC: 2.3 MG/DL (ref 1.6–2.6)
MCH RBC QN AUTO: 30.5 PG (ref 26–34)
MCHC RBC AUTO-ENTMCNC: 35.6 G/DL (ref 31–37)
MCV RBC AUTO: 85.6 FL
MONOCYTES # BLD AUTO: 0.73 X10(3) UL (ref 0.1–1)
MONOCYTES NFR BLD AUTO: 8.6 %
NEUTROPHILS # BLD AUTO: 4.51 X10 (3) UL (ref 1.5–7.7)
NEUTROPHILS # BLD AUTO: 4.51 X10(3) UL (ref 1.5–7.7)
NEUTROPHILS NFR BLD AUTO: 53.4 %
NONHDLC SERPL-MCNC: 206 MG/DL (ref ?–130)
OSMOLALITY SERPL CALC.SUM OF ELEC: 289 MOSM/KG (ref 275–295)
P AXIS: 58 DEGREES
P-R INTERVAL: 164 MS
PLATELET # BLD AUTO: 239 10(3)UL (ref 150–450)
POTASSIUM SERPL-SCNC: 3.7 MMOL/L (ref 3.5–5.1)
PROT SERPL-MCNC: 7.5 G/DL (ref 6.4–8.2)
Q-T INTERVAL: 412 MS
QRS DURATION: 96 MS
QTC CALCULATION (BEZET): 495 MS
R AXIS: 27 DEGREES
RBC # BLD AUTO: 5.57 X10(6)UL
SODIUM SERPL-SCNC: 138 MMOL/L (ref 136–145)
T AXIS: 99 DEGREES
TRIGL SERPL-MCNC: 243 MG/DL (ref 30–149)
TROPONIN I HIGH SENSITIVITY: 172 NG/L
TROPONIN I HIGH SENSITIVITY: 193 NG/L
TROPONIN I HIGH SENSITIVITY: 202 NG/L
VENTRICULAR RATE: 87 BPM
VLDLC SERPL CALC-MCNC: 48 MG/DL (ref 0–30)
WBC # BLD AUTO: 8.5 X10(3) UL (ref 4–11)

## 2023-09-01 PROCEDURE — B2111ZZ FLUOROSCOPY OF MULTIPLE CORONARY ARTERIES USING LOW OSMOLAR CONTRAST: ICD-10-PCS | Performed by: INTERNAL MEDICINE

## 2023-09-01 PROCEDURE — 71045 X-RAY EXAM CHEST 1 VIEW: CPT | Performed by: EMERGENCY MEDICINE

## 2023-09-01 PROCEDURE — 4A023N7 MEASUREMENT OF CARDIAC SAMPLING AND PRESSURE, LEFT HEART, PERCUTANEOUS APPROACH: ICD-10-PCS | Performed by: INTERNAL MEDICINE

## 2023-09-01 PROCEDURE — 99223 1ST HOSP IP/OBS HIGH 75: CPT | Performed by: HOSPITALIST

## 2023-09-01 RX ORDER — HEPARIN SODIUM AND DEXTROSE 10000; 5 [USP'U]/100ML; G/100ML
1000 INJECTION INTRAVENOUS ONCE
Status: COMPLETED | OUTPATIENT
Start: 2023-09-01 | End: 2023-09-01

## 2023-09-01 RX ORDER — ACETAMINOPHEN 500 MG
500 TABLET ORAL EVERY 4 HOURS PRN
Status: DISCONTINUED | OUTPATIENT
Start: 2023-09-01 | End: 2023-09-02

## 2023-09-01 RX ORDER — NITROGLYCERIN 0.4 MG/1
0.4 TABLET SUBLINGUAL EVERY 5 MIN PRN
Status: DISCONTINUED | OUTPATIENT
Start: 2023-09-01 | End: 2023-09-02

## 2023-09-01 RX ORDER — ASPIRIN 325 MG
325 TABLET ORAL DAILY
Status: DISCONTINUED | OUTPATIENT
Start: 2023-09-02 | End: 2023-09-02

## 2023-09-01 RX ORDER — HEPARIN SODIUM 1000 [USP'U]/ML
INJECTION, SOLUTION INTRAVENOUS; SUBCUTANEOUS
Status: COMPLETED
Start: 2023-09-01 | End: 2023-09-01

## 2023-09-01 RX ORDER — NITROGLYCERIN 20 MG/100ML
INJECTION INTRAVENOUS
Status: COMPLETED
Start: 2023-09-01 | End: 2023-09-01

## 2023-09-01 RX ORDER — PROCHLORPERAZINE EDISYLATE 5 MG/ML
5 INJECTION INTRAMUSCULAR; INTRAVENOUS EVERY 8 HOURS PRN
Status: DISCONTINUED | OUTPATIENT
Start: 2023-09-01 | End: 2023-09-02

## 2023-09-01 RX ORDER — HEPARIN SODIUM 1000 [USP'U]/ML
5000 INJECTION, SOLUTION INTRAVENOUS; SUBCUTANEOUS ONCE
Status: DISCONTINUED | OUTPATIENT
Start: 2023-09-01 | End: 2023-09-01

## 2023-09-01 RX ORDER — HEPARIN SODIUM 1000 [USP'U]/ML
5000 INJECTION, SOLUTION INTRAVENOUS; SUBCUTANEOUS ONCE
Status: COMPLETED | OUTPATIENT
Start: 2023-09-01 | End: 2023-09-01

## 2023-09-01 RX ORDER — VERAPAMIL HYDROCHLORIDE 2.5 MG/ML
INJECTION, SOLUTION INTRAVENOUS
Status: COMPLETED
Start: 2023-09-01 | End: 2023-09-01

## 2023-09-01 RX ORDER — ONDANSETRON 2 MG/ML
4 INJECTION INTRAMUSCULAR; INTRAVENOUS EVERY 6 HOURS PRN
Status: DISCONTINUED | OUTPATIENT
Start: 2023-09-01 | End: 2023-09-02

## 2023-09-01 RX ORDER — HEPARIN SODIUM AND DEXTROSE 10000; 5 [USP'U]/100ML; G/100ML
1000 INJECTION INTRAVENOUS ONCE
Status: DISCONTINUED | OUTPATIENT
Start: 2023-09-01 | End: 2023-09-01

## 2023-09-01 RX ORDER — HEPARIN SODIUM AND DEXTROSE 10000; 5 [USP'U]/100ML; G/100ML
INJECTION INTRAVENOUS CONTINUOUS
Status: DISCONTINUED | OUTPATIENT
Start: 2023-09-01 | End: 2023-09-02

## 2023-09-01 RX ORDER — TEMAZEPAM 15 MG/1
15 CAPSULE ORAL NIGHTLY PRN
Status: DISCONTINUED | OUTPATIENT
Start: 2023-09-01 | End: 2023-09-02

## 2023-09-01 RX ORDER — LIDOCAINE HYDROCHLORIDE 20 MG/ML
INJECTION, SOLUTION EPIDURAL; INFILTRATION; INTRACAUDAL; PERINEURAL
Status: COMPLETED
Start: 2023-09-01 | End: 2023-09-01

## 2023-09-01 RX ORDER — MIDAZOLAM HYDROCHLORIDE 1 MG/ML
INJECTION INTRAMUSCULAR; INTRAVENOUS
Status: COMPLETED
Start: 2023-09-01 | End: 2023-09-01

## 2023-09-01 RX ORDER — ROSUVASTATIN CALCIUM 20 MG/1
20 TABLET, COATED ORAL NIGHTLY
Status: DISCONTINUED | OUTPATIENT
Start: 2023-09-01 | End: 2023-09-02

## 2023-09-01 RX ORDER — ASPIRIN 81 MG/1
324 TABLET, CHEWABLE ORAL ONCE
Status: COMPLETED | OUTPATIENT
Start: 2023-09-01 | End: 2023-09-01

## 2023-09-01 RX ORDER — HEPARIN SODIUM AND DEXTROSE 10000; 5 [USP'U]/100ML; G/100ML
INJECTION INTRAVENOUS CONTINUOUS
Status: DISCONTINUED | OUTPATIENT
Start: 2023-09-01 | End: 2023-09-01

## 2023-09-01 NOTE — H&P
[facility]    PATIENT'S NAME: Gurpreet Guillaume   ATTENDING PHYSICIAN: Gagandeep Andino MD   PATIENT ACCOUNT#:   [id number]    LOCATION:  [address]  MEDICAL RECORD #:   [id number]       YOB: 1965  ADMISSION DATE:       09/01/2023    HISTORY AND PHYSICAL EXAMINATION    CHIEF COMPLAINT:  Unstable angina. HISTORY OF PRESENT ILLNESS:  The patient is a 69-year-old  male who came into the emergency department for evaluation of chest pain on exertion that goes away with rest for the last week. CBC and chemistry unremarkable. Troponin 172. EKG showed normal sinus rhythm and nonspecific ST-T changes. D-dimer and magnesium were normal.  The patient was started on aspirin and IV heparin. He will be admitted to hospital for further management. PAST MEDICAL HISTORY:  Mild hyperlipidemia. The patient denies any other medical problems. He does not take medication. PAST SURGICAL HISTORY:  He had perianal abscess drainage multiple times. ALLERGIES:  No known drug allergies. FAMILY HISTORY:  Mother had hypertension and cerebrovascular accident. Father is alive with no significant medical problems. SOCIAL HISTORY:  No tobacco or drug use. Social alcohol. Lives with his family. Independent in his basic activities of daily living. REVIEW OF SYSTEMS:  The patient reports while he was on a trip to [facility] one week ago, he was climbing stairs and started feeling midsternal chest tightness. Ever since, symptoms have recurred every time he tries to exert himself. Yesterday he was on a bicycle and then started having midsternal chest discomfort radiating to his right arm. When he stopped his activity, the pain resolved within 5 to 10 minutes. Other 12-point review of systems negative. PHYSICAL EXAMINATION:    GENERAL:  Alert. Oriented to time, place, and person. No acute distress.   VITAL SIGNS:  Temperature 98.0, pulse 75, respiratory rate 10, blood pressure 145/89, pulse ox 97% on room air. HEENT:  Atraumatic. Oropharynx clear, moist mucous membranes. Normal hard and soft palate. Eyes:  Anicteric sclerae. NECK:  Supple. No lymphadenopathy. Trachea midline. Full range of motion. LUNGS:  Clear to auscultation bilaterally. Normal respiratory effort. HEART:  Regular rate and rhythm. S1, S2 auscultated. No murmur. ABDOMEN:  Soft, nondistended, no tenderness. Positive bowel sounds. EXTREMITIES:  No peripheral edema, clubbing, or cyanosis. NEUROLOGIC:  Motor and sensory intact. ASSESSMENT:    1. Unstable angina. Rule out obstructive coronary artery disease. 2.   Elevated blood pressure. Possible undiagnosed essential hypertension. PLAN:  The patient will be admitted to telemetry floor. Continue to trend his troponin level, continue full dose aspirin, start him on IV heparin cardiac protocol, obtain cardiology consult to evaluate the patient for cardiac angiogram.  Further recommendations to follow.     Dictated By Fanny Kamara MD  d: 09/01/2023 11:11:16  t: 09/01/2023 11:14:36  [facility] [id number]  FB/

## 2023-09-01 NOTE — PROCEDURES
[facility]    Cardiac Cath Procedure Note  Katherine Martinez Patient Status:  Inpatient    5/10/1965 MRN [id number]   Location [facility] 3W/SW Attending Noah Aguilar MD   Hosp Day # 0 PCP Carmen Bolanos MD       Cardiologist: Kelvin Briones MD  Primary Proceduralist: Kelvin Briones MD  Procedure Performed: LHC, COR, and LV  Date of Procedure: 2023   Indication: NSTEMI    Summary of procedure: Multivessel coronary disease including the LAD, OM1, left PDA, mid RCA. CV surgery consult. Assessment:  NSTEMI with accelerating angina  Unknown EF  Multivessel coronary disease      Recommendations:  Echocardiogram pending  CV surgery consult, to be seen tomorrow morning  Aspirin and statin    Depending on symptoms, troponin peak, ejection fraction and CV surgery evaluation may be able to go home tomorrow. Left Ventriculography and hemodynamics:   LV EF not done  LV EDP 6 mmHg, 500 cc fluid bolus given  No gradient across aortic valve        Coronary Angiography  RCA:  Dominant mid RCA 95% stenosis, supplies PDA and PL which are nonobstructive    Left main:  Free of obstructive disease    Left anterior descending: Diffusely diseased in the proximal mid segment, 80% tubular stenosis. After the takeoff of the diagonal the LAD does taper however appears relatively free of obstructive disease. Circumflex: Codominant system, significant OM ostial disease and mid left PDA disease 70 to 80%. Summary of Case: After written informed consent was obtained from the patient, patient was brought to the cardiac catheterization laboratory. Patient was prepped and draped in the usual sterile fashion. Lidocaine 1% was used to infiltrate the right radial artery for local anesthesia and a 6 Haitian introducer sheath was inserted into the right radial artery. Selective coronary angiography performed with JR4 catheter for RCA and JL3.5 catheter for LCA.   Angiography performed in standard projections. 6 Estonian JR4 catheter placed in LV for hemodynamics. Specimen sent to: No specimen collected  Estimated blood loss: 10 cc  Closure:  TR band      IV was maintained by RN and moderate conscious sedation of versed and fentanyl was given. Patient was assessed and monitoring of oxygen, heart rate and blood pressure by nurse and myself during the exam 35 minutes.       Danny Lam MD  09/01/23

## 2023-09-01 NOTE — ED QUICK NOTES
Orders for admission, patient is aware of plan and ready to go upstairs. Any questions, please call ED RN Radha at extension [phone]. Patient Covid vaccination status: Unvaccinated     COVID Test Ordered in ED: None    COVID Suspicion at Admission: N/A    Running Infusions:  None    Mental Status/LOC at time of transport: alert and orientated x4, steady gait, lives in private home with family.      Other pertinent information:   CIWA score: N/A   NIH score:  N/A

## 2023-09-01 NOTE — PLAN OF CARE
Patient back from cath lab with plans for CV surgery consult. Resume heparin infusion at 1900 without bolus. Orders placed.

## 2023-09-01 NOTE — ED QUICK NOTES
PT to ED with wife, steady gait, reporting chest pain x6 days, here today due to worsening pain with exertion, denies dyspnea at rest. Denies nausea, vomiting, diarrhea, constipation.

## 2023-09-02 ENCOUNTER — APPOINTMENT (OUTPATIENT)
Dept: CV DIAGNOSTICS | Facility: HOSPITAL | Age: 58
End: 2023-09-02
Attending: INTERNAL MEDICINE
Payer: MEDICAID

## 2023-09-02 ENCOUNTER — APPOINTMENT (OUTPATIENT)
Dept: CT IMAGING | Facility: HOSPITAL | Age: 58
End: 2023-09-02
Attending: THORACIC SURGERY (CARDIOTHORACIC VASCULAR SURGERY)
Payer: MEDICAID

## 2023-09-02 ENCOUNTER — APPOINTMENT (OUTPATIENT)
Dept: ULTRASOUND IMAGING | Facility: HOSPITAL | Age: 58
End: 2023-09-02
Attending: THORACIC SURGERY (CARDIOTHORACIC VASCULAR SURGERY)
Payer: MEDICAID

## 2023-09-02 VITALS
DIASTOLIC BLOOD PRESSURE: 78 MMHG | SYSTOLIC BLOOD PRESSURE: 135 MMHG | HEART RATE: 71 BPM | OXYGEN SATURATION: 95 % | WEIGHT: 195 LBS | BODY MASS INDEX: 28 KG/M2 | RESPIRATION RATE: 17 BRPM | TEMPERATURE: 98 F

## 2023-09-02 LAB
ANION GAP SERPL CALC-SCNC: 2 MMOL/L (ref 0–18)
APTT PPP: 27.8 SECONDS (ref 23.3–35.6)
APTT PPP: 38.1 SECONDS (ref 23.3–35.6)
BASOPHILS # BLD AUTO: 0.07 X10(3) UL (ref 0–0.2)
BASOPHILS NFR BLD AUTO: 0.8 %
BUN BLD-MCNC: 13 MG/DL (ref 7–18)
BUN/CREAT SERPL: 11.1 (ref 10–20)
CALCIUM BLD-MCNC: 9.3 MG/DL (ref 8.5–10.1)
CHLORIDE SERPL-SCNC: 104 MMOL/L (ref 98–112)
CO2 SERPL-SCNC: 32 MMOL/L (ref 21–32)
CREAT BLD-MCNC: 1.17 MG/DL
DEPRECATED RDW RBC AUTO: 39.2 FL (ref 35.1–46.3)
EGFRCR SERPLBLD CKD-EPI 2021: 72 ML/MIN/1.73M2 (ref 60–?)
EOSINOPHIL # BLD AUTO: 0.71 X10(3) UL (ref 0–0.7)
EOSINOPHIL NFR BLD AUTO: 7.8 %
ERYTHROCYTE [DISTWIDTH] IN BLOOD BY AUTOMATED COUNT: 12.2 % (ref 11–15)
GLUCOSE BLD-MCNC: 129 MG/DL (ref 70–99)
HCT VFR BLD AUTO: 48.1 %
HGB BLD-MCNC: 16.5 G/DL
IMM GRANULOCYTES # BLD AUTO: 0.03 X10(3) UL (ref 0–1)
IMM GRANULOCYTES NFR BLD: 0.3 %
LYMPHOCYTES # BLD AUTO: 2.35 X10(3) UL (ref 1–4)
LYMPHOCYTES NFR BLD AUTO: 26 %
MCH RBC QN AUTO: 30.2 PG (ref 26–34)
MCHC RBC AUTO-ENTMCNC: 34.3 G/DL (ref 31–37)
MCV RBC AUTO: 87.9 FL
MONOCYTES # BLD AUTO: 0.79 X10(3) UL (ref 0.1–1)
MONOCYTES NFR BLD AUTO: 8.7 %
NEUTROPHILS # BLD AUTO: 5.1 X10 (3) UL (ref 1.5–7.7)
NEUTROPHILS # BLD AUTO: 5.1 X10(3) UL (ref 1.5–7.7)
NEUTROPHILS NFR BLD AUTO: 56.4 %
OSMOLALITY SERPL CALC.SUM OF ELEC: 288 MOSM/KG (ref 275–295)
PLATELET # BLD AUTO: 222 10(3)UL (ref 150–450)
POTASSIUM SERPL-SCNC: 4.9 MMOL/L (ref 3.5–5.1)
RBC # BLD AUTO: 5.47 X10(6)UL
SODIUM SERPL-SCNC: 138 MMOL/L (ref 136–145)
WBC # BLD AUTO: 9.1 X10(3) UL (ref 4–11)

## 2023-09-02 PROCEDURE — 71250 CT THORAX DX C-: CPT | Performed by: THORACIC SURGERY (CARDIOTHORACIC VASCULAR SURGERY)

## 2023-09-02 PROCEDURE — 93880 EXTRACRANIAL BILAT STUDY: CPT | Performed by: THORACIC SURGERY (CARDIOTHORACIC VASCULAR SURGERY)

## 2023-09-02 PROCEDURE — 99239 HOSP IP/OBS DSCHRG MGMT >30: CPT | Performed by: HOSPITALIST

## 2023-09-02 PROCEDURE — 93306 TTE W/DOPPLER COMPLETE: CPT | Performed by: INTERNAL MEDICINE

## 2023-09-02 RX ORDER — HEPARIN SODIUM 1000 [USP'U]/ML
30 INJECTION, SOLUTION INTRAVENOUS; SUBCUTANEOUS ONCE
Status: COMPLETED | OUTPATIENT
Start: 2023-09-02 | End: 2023-09-02

## 2023-09-02 RX ORDER — ROSUVASTATIN CALCIUM 20 MG/1
20 TABLET, COATED ORAL NIGHTLY
Qty: 30 TABLET | Refills: 0 | Status: SHIPPED | OUTPATIENT
Start: 2023-09-02 | End: 2023-09-09

## 2023-09-02 RX ORDER — ASPIRIN 325 MG
325 TABLET ORAL DAILY
Qty: 30 TABLET | Refills: 3 | Status: SHIPPED | OUTPATIENT
Start: 2023-09-03 | End: 2023-09-09

## 2023-09-02 NOTE — RESPIRATORY THERAPY NOTE
Per pt, although he has a bipap at home, he has not use it in over a year. Pt refuse NIV at this time for his admission.

## 2023-09-02 NOTE — PLAN OF CARE
Alert x4. Echo, ct, us cartoid completed. Patient to follow up Tuesday with cardiology. Cleared for discharge. Instructions given and reviewed with patient. Family to transfer patient home. Problem: Patient Centered Care  Goal: Patient preferences are identified and integrated in the patient's plan of care  Description: Interventions:  - What would you like us to know as we care for you?  I live with my family  - Provide timely, complete, and accurate information to patient/family  - Incorporate patient and family knowledge, values, beliefs, and cultural backgrounds into the planning and delivery of care  - Encourage patient/family to participate in care and decision-making at the level they choose  - Honor patient and family perspectives and choices  Outcome: Completed     Problem: Patient/Family Goals  Goal: Patient/Family Long Term Goal  Description: Patient's Long Term Goal:     Interventions:  -   - See additional Care Plan goals for specific interventions  Outcome: Completed  Goal: Patient/Family Short Term Goal  Description: Patient's Short Term Goal:     Interventions:   -   - See additional Care Plan goals for specific interventions  Outcome: Completed

## 2023-09-02 NOTE — PLAN OF CARE
Patient is alert & oriented x4 on RA. Vital signs stable at this time. No acute changes noted throughout shift; patient slept. Hep gtt per protocol. Fall precautions maintained - bed alarm on, bed locked in lowest position, call light and personal belongings within reach, non-skid socks in place. Frequent rounding by nursing staff. Plan possible discharge. Problem: Patient Centered Care  Goal: Patient preferences are identified and integrated in the patient's plan of care  Description: Interventions:  - What would you like us to know as we care for you?  From home with family  - Provide timely, complete, and accurate information to patient/family  - Incorporate patient and family knowledge, values, beliefs, and cultural backgrounds into the planning and delivery of care  - Encourage patient/family to participate in care and decision-making at the level they choose  - Honor patient and family perspectives and choices  Outcome: Progressing     Problem: Patient/Family Goals  Goal: Patient/Family Long Term Goal  Description: Patient's Long Term Goal: To go home    Interventions:  - Follow MD orders  - See additional Care Plan goals for specific interventions  Outcome: Progressing  Goal: Patient/Family Short Term Goal  Description: Patient's Short Term Goal: Manage CP    Interventions:   - Follow MD orders  - See additional Care Plan goals for specific interventions  Outcome: Progressing

## 2023-09-02 NOTE — CM/SW NOTE
09/02/23 1200   Discharge disposition   Expected discharge disposition Home or [facility] Home   Discharge transportation Private car     The patient received a MDO for discharge. The patient will be transported home via private car. The patient does not have any questions or concerns at this time. SW/CM to remain available for support and/or discharge planning.      Jenny MANUEL, [facility]  Discharge Planner [id number]

## 2023-09-02 NOTE — PROGRESS NOTES
Alert x4. Denies chest pain or sob. VSS. Angiogram done no intervention. TR band right radial in place. Heparin restarted at 1900 at 10ml next ptt at 1am. Vss. Per patient he is supposed to be wearing bipap but hasn't in over a year. Call light within reach. Family at bedside. Patient refused to order meal. Outside food brought in by family. Cardiac diet encouraged.

## 2023-09-05 ENCOUNTER — PATIENT OUTREACH (OUTPATIENT)
Dept: CASE MANAGEMENT | Age: 58
End: 2023-09-05

## 2023-09-05 ENCOUNTER — HOSPITAL ENCOUNTER (OUTPATIENT)
Dept: INTERVENTIONAL RADIOLOGY/VASCULAR | Facility: HOSPITAL | Age: 58
Discharge: HOME | End: 2023-09-05
Attending: INTERNAL MEDICINE
Payer: MEDICAID

## 2023-09-05 ENCOUNTER — ANESTHESIA EVENT (OUTPATIENT)
Dept: SURGERY | Facility: HOSPITAL | Age: 58
End: 2023-09-05
Payer: MEDICAID

## 2023-09-05 LAB
ANTIBODY SCREEN: NEGATIVE
APTT PPP: 27.9 SECONDS (ref 23.3–35.6)
BILIRUB UR QL: NEGATIVE
CLARITY UR: CLEAR
EST. AVERAGE GLUCOSE BLD GHB EST-MCNC: 128 MG/DL (ref 68–126)
GLUCOSE UR-MCNC: NORMAL MG/DL
HBA1C MFR BLD: 6.1 % (ref ?–5.7)
HGB UR QL STRIP.AUTO: NEGATIVE
INR BLD: 0.97 (ref 0.85–1.16)
KETONES UR-MCNC: NEGATIVE MG/DL
LEUKOCYTE ESTERASE UR QL STRIP.AUTO: NEGATIVE
MRSA DNA SPEC QL NAA+PROBE: NEGATIVE
NITRITE UR QL STRIP.AUTO: NEGATIVE
PH UR: 5.5 [PH] (ref 5–8)
PROT UR-MCNC: NEGATIVE MG/DL
PROTHROMBIN TIME: 12.8 SECONDS (ref 11.6–14.8)
RH BLOOD TYPE: POSITIVE
RH BLOOD TYPE: POSITIVE
SARS-COV-2 RNA RESP QL NAA+PROBE: NOT DETECTED
SP GR UR STRIP: 1.02 (ref 1–1.03)
UROBILINOGEN UR STRIP-ACNC: NORMAL

## 2023-09-05 PROCEDURE — 85730 THROMBOPLASTIN TIME PARTIAL: CPT | Performed by: THORACIC SURGERY (CARDIOTHORACIC VASCULAR SURGERY)

## 2023-09-05 PROCEDURE — 83036 HEMOGLOBIN GLYCOSYLATED A1C: CPT | Performed by: THORACIC SURGERY (CARDIOTHORACIC VASCULAR SURGERY)

## 2023-09-05 PROCEDURE — 86850 RBC ANTIBODY SCREEN: CPT | Performed by: THORACIC SURGERY (CARDIOTHORACIC VASCULAR SURGERY)

## 2023-09-05 PROCEDURE — 86920 COMPATIBILITY TEST SPIN: CPT

## 2023-09-05 PROCEDURE — 87641 MR-STAPH DNA AMP PROBE: CPT | Performed by: THORACIC SURGERY (CARDIOTHORACIC VASCULAR SURGERY)

## 2023-09-05 PROCEDURE — 85610 PROTHROMBIN TIME: CPT | Performed by: THORACIC SURGERY (CARDIOTHORACIC VASCULAR SURGERY)

## 2023-09-05 PROCEDURE — 86900 BLOOD TYPING SEROLOGIC ABO: CPT | Performed by: THORACIC SURGERY (CARDIOTHORACIC VASCULAR SURGERY)

## 2023-09-05 PROCEDURE — 86901 BLOOD TYPING SEROLOGIC RH(D): CPT | Performed by: THORACIC SURGERY (CARDIOTHORACIC VASCULAR SURGERY)

## 2023-09-05 RX ORDER — GABAPENTIN 300 MG/1
300 CAPSULE ORAL ONCE
Status: DISCONTINUED | OUTPATIENT
Start: 2023-09-06 | End: 2023-09-06

## 2023-09-05 NOTE — PROGRESS NOTES
Misc. Note    Pt scheduled for elective CABG w/Dr. Leighann Sanchez tomorrow. Pre-admission Testing performed today w/patient/wife and daughter present. Pt informed of CT chest and carotid US results including thyroid nodules noted. He was instructed to follow up post op with his PCP, which an appt will be made for him prior to discharge, for further evaluation; thyroid US recommended. Pt states his mother has a \"thyroid issue\" but unsure of any details. He verbalized understanding of info/all questions answered. Will include this info on his post op CABG instructions as well.

## 2023-09-05 NOTE — PAT NURSING NOTE
Pt is having CABG with Dr Emma Gunderson on 9/6. Preadmission testing performed today with pt. And wife/ Written and verbal information provide re:perioperative expectations with all questions answered. Pt and wife verbalized understanding. Consents obtained. STS risk score discussed with pt and wife. Pre-op instructions provided for patient's surgery on 9/6 and both verbalized understanding.

## 2023-09-05 NOTE — PROGRESS NOTES
TCM chart review. No TCM as patient will be readmitted tomorrow for surgery (CABG). Encounter closing.

## 2023-09-05 NOTE — CM/SW NOTE
09/05/23 1800   CM/SW Referral Data   Referral Source Physician   Reason for Referral Protocol order set   Specify order set CABG   Informant Patient   Medical Hx   Does patient have an established PCP? Yes   Patient Info   Advanced directives? No  (Wife is surrogate decision maker if required)   Information provided? Yes   Patient's [address]   Number of Levels in Home 1   Number of Stair in Home   (0)   Patient lives with Spouse/Significant other;Daughter   Patient Status Prior to Admission   Independent with ADLs and Mobility Yes  (working as self employeed)   Discharge Needs   Anticipated D/C needs Home health care     Surgery scheduled for 9/6. Reviewed protocol with  following at [address] and his wife is agreeable. / to remain available for support and/or discharge planning.       Marisel CONTRERASN RN [address]  RN Case Manager  [phone]

## 2023-09-05 NOTE — DISCHARGE INSTRUCTIONS
You have thyroid nodules noted on your pre-operative CT Chest and Carotid Ultrasound results. Recommend on-going surveillance with a Thyroid Ultrasound. Please discuss this with your Primary MD, Dr. Yuridia Granados, during your follow up appointment as he can order that test for you if he feels necessary. He can further evaluate.      [facility]  [facility]  [address] [facility] [address], [address]  Phone: [phone]  Fax: [phone]        Shower daily and clean your surgical incisions with soap and water daily then swab with Betadine 2 x day until your follow up office visit with Maisha  Do not drive for one month  Do not lift/push/pull greater than 10 lbs for 3 months   Do not soak (submerge) your incision in water such as tub/pool/etc for one month

## 2023-09-06 ENCOUNTER — APPOINTMENT (OUTPATIENT)
Dept: GENERAL RADIOLOGY | Facility: HOSPITAL | Age: 58
End: 2023-09-06
Attending: CLINICAL NURSE SPECIALIST
Payer: MEDICAID

## 2023-09-06 ENCOUNTER — ANESTHESIA (OUTPATIENT)
Dept: SURGERY | Facility: HOSPITAL | Age: 58
End: 2023-09-06
Payer: MEDICAID

## 2023-09-06 ENCOUNTER — HOSPITAL ENCOUNTER (INPATIENT)
Facility: HOSPITAL | Age: 58
LOS: 3 days | Discharge: HOME HEALTH | End: 2023-09-09
Attending: THORACIC SURGERY (CARDIOTHORACIC VASCULAR SURGERY) | Admitting: THORACIC SURGERY (CARDIOTHORACIC VASCULAR SURGERY)
Payer: MEDICAID

## 2023-09-06 PROBLEM — Z01.818 PREOP TESTING: Status: ACTIVE | Noted: 2023-09-06

## 2023-09-06 PROBLEM — I25.10 CORONARY ARTERY DISEASE INVOLVING NATIVE CORONARY ARTERY OF NATIVE HEART: Chronic | Status: ACTIVE | Noted: 2023-09-06

## 2023-09-06 LAB
ANION GAP SERPL CALC-SCNC: 3 MMOL/L (ref 0–18)
APTT PPP: 31.3 SECONDS (ref 23.3–35.6)
BASE EXCESS BLD CALC-SCNC: -1.5 MMOL/L (ref ?–2)
BASE EXCESS BLD CALC-SCNC: -2.3 MMOL/L (ref ?–2)
BASE EXCESS BLD CALC-SCNC: -3.2 MMOL/L (ref ?–2)
BASE EXCESS BLDA CALC-SCNC: -10 MMOL/L (ref ?–30)
BASE EXCESS BLDA CALC-SCNC: -2 MMOL/L (ref ?–30)
BASE EXCESS BLDA CALC-SCNC: -2 MMOL/L (ref ?–30)
BASE EXCESS BLDA CALC-SCNC: 0 MMOL/L (ref ?–30)
BUN BLD-MCNC: 10 MG/DL (ref 7–18)
BUN/CREAT SERPL: 10.6 (ref 10–20)
CA-I BLD-SCNC: 1.04 MMOL/L (ref 0.95–1.32)
CA-I BLD-SCNC: 1.06 MMOL/L (ref 0.95–1.32)
CA-I BLDA-SCNC: 1.08 MMOL/L (ref 1.12–1.32)
CA-I BLDA-SCNC: 1.13 MMOL/L (ref 1.12–1.32)
CA-I BLDA-SCNC: 1.14 MMOL/L (ref 1.12–1.32)
CA-I BLDA-SCNC: 1.24 MMOL/L (ref 1.12–1.32)
CALCIUM BLD-MCNC: 7.1 MG/DL (ref 8.5–10.1)
CHLORIDE SERPL-SCNC: 116 MMOL/L (ref 98–112)
CO2 BLDA-SCNC: 18 MMOL/L (ref 22–32)
CO2 BLDA-SCNC: 24 MMOL/L (ref 22–32)
CO2 BLDA-SCNC: 26 MMOL/L (ref 22–32)
CO2 BLDA-SCNC: 27 MMOL/L (ref 22–32)
CO2 SERPL-SCNC: 24 MMOL/L (ref 21–32)
COHGB MFR BLD: 1.8 % (ref 0–3)
COHGB MFR BLD: 1.9 % (ref 0–3)
CREAT BLD-MCNC: 0.94 MG/DL
DEPRECATED RDW RBC AUTO: 39.6 FL (ref 35.1–46.3)
EGFRCR SERPLBLD CKD-EPI 2021: 93 ML/MIN/1.73M2 (ref 60–?)
ERYTHROCYTE [DISTWIDTH] IN BLOOD BY AUTOMATED COUNT: 12.2 % (ref 11–15)
FIBRINOGEN PPP-MCNC: 181 MG/DL (ref 180–480)
GLUCOSE BLD-MCNC: 163 MG/DL (ref 70–99)
GLUCOSE BLDA-MCNC: 129 MG/DL (ref 70–99)
GLUCOSE BLDA-MCNC: 202 MG/DL (ref 70–99)
GLUCOSE BLDA-MCNC: 204 MG/DL (ref 70–99)
GLUCOSE BLDA-MCNC: 219 MG/DL (ref 70–99)
GLUCOSE BLDC GLUCOMTR-MCNC: 121 MG/DL (ref 70–99)
GLUCOSE BLDC GLUCOMTR-MCNC: 123 MG/DL (ref 70–99)
GLUCOSE BLDC GLUCOMTR-MCNC: 129 MG/DL (ref 70–99)
GLUCOSE BLDC GLUCOMTR-MCNC: 134 MG/DL (ref 70–99)
GLUCOSE BLDC GLUCOMTR-MCNC: 137 MG/DL (ref 70–99)
GLUCOSE BLDC GLUCOMTR-MCNC: 143 MG/DL (ref 70–99)
GLUCOSE BLDC GLUCOMTR-MCNC: 146 MG/DL (ref 70–99)
GLUCOSE BLDC GLUCOMTR-MCNC: 149 MG/DL (ref 70–99)
GLUCOSE BLDC GLUCOMTR-MCNC: 156 MG/DL (ref 70–99)
GLUCOSE BLDC GLUCOMTR-MCNC: 156 MG/DL (ref 70–99)
GLUCOSE BLDC GLUCOMTR-MCNC: 168 MG/DL (ref 70–99)
HCO3 BLDA-SCNC: 16.7 MEQ/L (ref 22–26)
HCO3 BLDA-SCNC: 22.4 MEQ/L (ref 21–27)
HCO3 BLDA-SCNC: 23.1 MEQ/L (ref 21–27)
HCO3 BLDA-SCNC: 23.1 MEQ/L (ref 22–26)
HCO3 BLDA-SCNC: 24.6 MEQ/L (ref 22–26)
HCO3 BLDA-SCNC: 25.5 MEQ/L (ref 22–26)
HCO3 BLDV-SCNC: 22.9 MEQ/L (ref 22–26)
HCT VFR BLD AUTO: 37.1 %
HCT VFR BLDA CALC: 30 %
HCT VFR BLDA CALC: 30 %
HCT VFR BLDA CALC: 32 %
HCT VFR BLDA CALC: 43 %
HGB BLD-MCNC: 13 G/DL
HGB BLD-MCNC: 13 G/DL
HGB BLD-MCNC: 13.3 G/DL
INR BLD: 1.4 (ref 0.85–1.16)
ISTAT ACTIVATED CLOTTING TIME: 125 SECONDS (ref 125–137)
ISTAT ACTIVATED CLOTTING TIME: 143 SECONDS (ref 125–137)
ISTAT ACTIVATED CLOTTING TIME: 354 SECONDS (ref 125–137)
ISTAT ACTIVATED CLOTTING TIME: 396 SECONDS (ref 125–137)
ISTAT ACTIVATED CLOTTING TIME: 624 SECONDS (ref 125–137)
LACTATE BLD-SCNC: 1.6 MMOL/L (ref 0.5–2)
LACTATE BLD-SCNC: 1.9 MMOL/L (ref 0.5–2)
MAGNESIUM SERPL-MCNC: 2.3 MG/DL (ref 1.6–2.6)
MCH RBC QN AUTO: 31.1 PG (ref 26–34)
MCHC RBC AUTO-ENTMCNC: 35 G/DL (ref 31–37)
MCV RBC AUTO: 88.8 FL
METHGB MFR BLD: 0.7 % SAT (ref 0.4–1.5)
METHGB MFR BLD: 1.4 % SAT (ref 0.4–1.5)
O2 CT BLD-SCNC: 17.8 VOL% (ref 15–23)
O2 CT BLD-SCNC: 18.3 VOL% (ref 15–23)
O2/TOTAL GAS SETTING VFR VENT: 40 %
O2/TOTAL GAS SETTING VFR VENT: 40 %
O2/TOTAL GAS SETTING VFR VENT: 50 %
OSMOLALITY SERPL CALC.SUM OF ELEC: 299 MOSM/KG (ref 275–295)
PCO2 BLDA: 33 MM HG (ref 35–45)
PCO2 BLDA: 33.3 MMHG (ref 35–45)
PCO2 BLDA: 41.6 MMHG (ref 35–45)
PCO2 BLDA: 43 MM HG (ref 35–45)
PCO2 BLDA: 45.9 MMHG (ref 35–45)
PCO2 BLDA: 54.1 MMHG (ref 35–45)
PCO2 BLDV: 47 MM HG (ref 38–50)
PEEP SETTING VENT: 5 CM H2O
PH BLDA: 7.27 [PH] (ref 7.35–7.45)
PH BLDA: 7.31 [PH] (ref 7.35–7.45)
PH BLDA: 7.33 [PH] (ref 7.35–7.45)
PH BLDA: 7.35 [PH] (ref 7.35–7.45)
PH BLDA: 7.35 [PH] (ref 7.35–7.45)
PH BLDA: 7.42 [PH] (ref 7.35–7.45)
PH BLDV: 7.33 [PH] (ref 7.32–7.43)
PLATELET # BLD AUTO: 154 10(3)UL (ref 150–450)
PO2 BLDA: 104 MM HG (ref 80–100)
PO2 BLDA: 139 MM HG (ref 80–100)
PO2 BLDA: 176 MMHG (ref 80–105)
PO2 BLDA: 203 MMHG (ref 80–105)
PO2 BLDA: 246 MMHG (ref 80–105)
PO2 BLDA: 76 MMHG (ref 80–105)
PO2 BLDV: 38 MM HG (ref 35–40)
POTASSIUM BLD-SCNC: 3.6 MMOL/L (ref 3.6–5.1)
POTASSIUM BLD-SCNC: 4.3 MMOL/L (ref 3.6–5.1)
POTASSIUM SERPL-SCNC: 3.8 MMOL/L (ref 3.5–5.1)
PRESSURE SUPPORT SETTING VENT: 10 CM H2O
PRESSURE SUPPORT SETTING VENT: 10 CM H2O
PRESSURE SUPPORT SETTING VENT: 5 CM H2O
PROTHROMBIN TIME: 16.9 SECONDS (ref 11.6–14.8)
PUNCTURE CHARGE: NO
RBC # BLD AUTO: 4.18 X10(6)UL
RESP RATE: 12 BPM
RESP RATE: 12 BPM
SAO2 % BLDA: 100 % (ref 92–100)
SAO2 % BLDA: 94 % (ref 92–100)
SAO2 % BLDA: >99 % (ref 94–100)
SAO2 % BLDA: >99 % (ref 94–100)
SAO2 % BLDV: 70.8 % (ref 60–85)
SODIUM BLD-SCNC: 135 MMOL/L (ref 135–145)
SODIUM BLD-SCNC: 136 MMOL/L (ref 135–145)
SODIUM BLDA-SCNC: 135 MMOL/L (ref 136–145)
SODIUM BLDA-SCNC: 137 MMOL/L (ref 136–145)
SODIUM BLDA-SCNC: 137 MMOL/L (ref 136–145)
SODIUM BLDA-SCNC: 139 MMOL/L (ref 136–145)
SODIUM BLDA-SCNC: 3.8 MMOL/L (ref 3.6–5.1)
SODIUM BLDA-SCNC: 4.3 MMOL/L (ref 3.6–5.1)
SODIUM BLDA-SCNC: 6.3 MMOL/L (ref 3.6–5.1)
SODIUM BLDA-SCNC: 6.5 MMOL/L (ref 3.6–5.1)
SODIUM SERPL-SCNC: 143 MMOL/L (ref 136–145)
SPECIMEN VOL 24H UR: 550 ML
SPECIMEN VOL 24H UR: 550 ML
WBC # BLD AUTO: 16.5 X10(3) UL (ref 4–11)

## 2023-09-06 PROCEDURE — 76942 ECHO GUIDE FOR BIOPSY: CPT | Performed by: ANESTHESIOLOGY

## 2023-09-06 PROCEDURE — 93312 ECHO TRANSESOPHAGEAL: CPT | Performed by: ANESTHESIOLOGY

## 2023-09-06 PROCEDURE — 02100Z9 BYPASS CORONARY ARTERY, ONE ARTERY FROM LEFT INTERNAL MAMMARY, OPEN APPROACH: ICD-10-PCS | Performed by: THORACIC SURGERY (CARDIOTHORACIC VASCULAR SURGERY)

## 2023-09-06 PROCEDURE — 99223 1ST HOSP IP/OBS HIGH 75: CPT | Performed by: INTERNAL MEDICINE

## 2023-09-06 PROCEDURE — 5A1935Z RESPIRATORY VENTILATION, LESS THAN 24 CONSECUTIVE HOURS: ICD-10-PCS | Performed by: ANESTHESIOLOGY

## 2023-09-06 PROCEDURE — 71045 X-RAY EXAM CHEST 1 VIEW: CPT | Performed by: CLINICAL NURSE SPECIALIST

## 2023-09-06 PROCEDURE — 06BQ4ZZ EXCISION OF LEFT SAPHENOUS VEIN, PERCUTANEOUS ENDOSCOPIC APPROACH: ICD-10-PCS | Performed by: THORACIC SURGERY (CARDIOTHORACIC VASCULAR SURGERY)

## 2023-09-06 PROCEDURE — 021209W BYPASS CORONARY ARTERY, THREE ARTERIES FROM AORTA WITH AUTOLOGOUS VENOUS TISSUE, OPEN APPROACH: ICD-10-PCS | Performed by: THORACIC SURGERY (CARDIOTHORACIC VASCULAR SURGERY)

## 2023-09-06 PROCEDURE — 5A1221Z PERFORMANCE OF CARDIAC OUTPUT, CONTINUOUS: ICD-10-PCS | Performed by: THORACIC SURGERY (CARDIOTHORACIC VASCULAR SURGERY)

## 2023-09-06 DEVICE — IMPLANTABLE DEVICE
Type: IMPLANTABLE DEVICE | Site: CHEST | Status: FUNCTIONAL
Brand: STERNALOCK® BLU SYSTEM

## 2023-09-06 RX ORDER — DOBUTAMINE HYDROCHLORIDE 200 MG/100ML
INJECTION INTRAVENOUS CONTINUOUS PRN
Status: DISCONTINUED | OUTPATIENT
Start: 2023-09-06 | End: 2023-09-07

## 2023-09-06 RX ORDER — MELATONIN
3 NIGHTLY PRN
Status: DISCONTINUED | OUTPATIENT
Start: 2023-09-06 | End: 2023-09-09

## 2023-09-06 RX ORDER — METOCLOPRAMIDE HYDROCHLORIDE 5 MG/ML
10 INJECTION INTRAMUSCULAR; INTRAVENOUS EVERY 6 HOURS
Status: DISCONTINUED | OUTPATIENT
Start: 2023-09-06 | End: 2023-09-09

## 2023-09-06 RX ORDER — ALBUMIN, HUMAN INJ 5% 5 %
SOLUTION INTRAVENOUS
Status: COMPLETED
Start: 2023-09-06 | End: 2023-09-06

## 2023-09-06 RX ORDER — GABAPENTIN 300 MG/1
300 CAPSULE ORAL ONCE
Status: COMPLETED | OUTPATIENT
Start: 2023-09-06 | End: 2023-09-06

## 2023-09-06 RX ORDER — SODIUM CHLORIDE 9 MG/ML
INJECTION, SOLUTION INTRAVENOUS CONTINUOUS PRN
Status: DISCONTINUED | OUTPATIENT
Start: 2023-09-06 | End: 2023-09-06 | Stop reason: SURG

## 2023-09-06 RX ORDER — MORPHINE SULFATE 2 MG/ML
2 INJECTION, SOLUTION INTRAMUSCULAR; INTRAVENOUS ONCE
Status: COMPLETED | OUTPATIENT
Start: 2023-09-06 | End: 2023-09-06

## 2023-09-06 RX ORDER — DEXTROSE AND SODIUM CHLORIDE 5; .9 G/100ML; G/100ML
INJECTION, SOLUTION INTRAVENOUS CONTINUOUS
Status: DISCONTINUED | OUTPATIENT
Start: 2023-09-06 | End: 2023-09-08

## 2023-09-06 RX ORDER — HYDROMORPHONE HYDROCHLORIDE 1 MG/ML
0.3 INJECTION, SOLUTION INTRAMUSCULAR; INTRAVENOUS; SUBCUTANEOUS ONCE
Status: COMPLETED | OUTPATIENT
Start: 2023-09-06 | End: 2023-09-06

## 2023-09-06 RX ORDER — DEXMEDETOMIDINE HYDROCHLORIDE 4 UG/ML
INJECTION, SOLUTION INTRAVENOUS CONTINUOUS
Status: DISCONTINUED | OUTPATIENT
Start: 2023-09-06 | End: 2023-09-07

## 2023-09-06 RX ORDER — DOCUSATE SODIUM 100 MG/1
100 CAPSULE, LIQUID FILLED ORAL 2 TIMES DAILY
Status: DISCONTINUED | OUTPATIENT
Start: 2023-09-07 | End: 2023-09-09

## 2023-09-06 RX ORDER — METOCLOPRAMIDE HYDROCHLORIDE 5 MG/ML
10 INJECTION INTRAMUSCULAR; INTRAVENOUS EVERY 6 HOURS
Status: DISCONTINUED | OUTPATIENT
Start: 2023-09-06 | End: 2023-09-06

## 2023-09-06 RX ORDER — FAMOTIDINE 20 MG/1
20 TABLET, FILM COATED ORAL ONCE
Status: COMPLETED | OUTPATIENT
Start: 2023-09-06 | End: 2023-09-06

## 2023-09-06 RX ORDER — LIDOCAINE HYDROCHLORIDE 10 MG/ML
INJECTION, SOLUTION EPIDURAL; INFILTRATION; INTRACAUDAL; PERINEURAL AS NEEDED
Status: DISCONTINUED | OUTPATIENT
Start: 2023-09-06 | End: 2023-09-06 | Stop reason: SURG

## 2023-09-06 RX ORDER — ASCORBIC ACID 500 MG
500 TABLET ORAL 3 TIMES DAILY
Status: DISCONTINUED | OUTPATIENT
Start: 2023-09-07 | End: 2023-09-09

## 2023-09-06 RX ORDER — MIDAZOLAM HYDROCHLORIDE 1 MG/ML
INJECTION INTRAMUSCULAR; INTRAVENOUS AS NEEDED
Status: DISCONTINUED | OUTPATIENT
Start: 2023-09-06 | End: 2023-09-06 | Stop reason: SURG

## 2023-09-06 RX ORDER — CEFAZOLIN SODIUM/WATER 2 G/20 ML
2 SYRINGE (ML) INTRAVENOUS
Status: COMPLETED | OUTPATIENT
Start: 2023-09-06 | End: 2023-09-06

## 2023-09-06 RX ORDER — ASCORBIC ACID 500 MG
1000 TABLET ORAL ONCE
Status: COMPLETED | OUTPATIENT
Start: 2023-09-06 | End: 2023-09-06

## 2023-09-06 RX ORDER — SENNOSIDES 8.6 MG
8.6 TABLET ORAL 2 TIMES DAILY
Status: DISCONTINUED | OUTPATIENT
Start: 2023-09-07 | End: 2023-09-09

## 2023-09-06 RX ORDER — NITROGLYCERIN 20 MG/100ML
INJECTION INTRAVENOUS CONTINUOUS PRN
Status: DISCONTINUED | OUTPATIENT
Start: 2023-09-06 | End: 2023-09-08

## 2023-09-06 RX ORDER — CLOPIDOGREL BISULFATE 75 MG/1
75 TABLET ORAL DAILY
Status: DISCONTINUED | OUTPATIENT
Start: 2023-09-07 | End: 2023-09-09

## 2023-09-06 RX ORDER — METOCLOPRAMIDE 10 MG/1
10 TABLET ORAL ONCE
Status: COMPLETED | OUTPATIENT
Start: 2023-09-06 | End: 2023-09-06

## 2023-09-06 RX ORDER — GLYCOPYRROLATE 0.2 MG/ML
0.6 INJECTION, SOLUTION INTRAMUSCULAR; INTRAVENOUS ONCE
Status: COMPLETED | OUTPATIENT
Start: 2023-09-06 | End: 2023-09-06

## 2023-09-06 RX ORDER — DOBUTAMINE HYDROCHLORIDE 200 MG/100ML
INJECTION INTRAVENOUS CONTINUOUS PRN
Status: DISCONTINUED | OUTPATIENT
Start: 2023-09-06 | End: 2023-09-06 | Stop reason: SURG

## 2023-09-06 RX ORDER — ENOXAPARIN SODIUM 100 MG/ML
40 INJECTION SUBCUTANEOUS DAILY
Status: DISCONTINUED | OUTPATIENT
Start: 2023-09-07 | End: 2023-09-09

## 2023-09-06 RX ORDER — MORPHINE SULFATE 4 MG/ML
8 INJECTION, SOLUTION INTRAMUSCULAR; INTRAVENOUS EVERY 2 HOUR PRN
Status: DISCONTINUED | OUTPATIENT
Start: 2023-09-06 | End: 2023-09-06

## 2023-09-06 RX ORDER — POTASSIUM CHLORIDE 29.8 MG/ML
40 INJECTION INTRAVENOUS AS NEEDED
Status: DISCONTINUED | OUTPATIENT
Start: 2023-09-06 | End: 2023-09-09

## 2023-09-06 RX ORDER — POLYETHYLENE GLYCOL 3350 17 G/17G
17 POWDER, FOR SOLUTION ORAL DAILY PRN
Status: DISCONTINUED | OUTPATIENT
Start: 2023-09-06 | End: 2023-09-09

## 2023-09-06 RX ORDER — HEPARIN SODIUM 1000 [USP'U]/ML
INJECTION, SOLUTION INTRAVENOUS; SUBCUTANEOUS AS NEEDED
Status: DISCONTINUED | OUTPATIENT
Start: 2023-09-06 | End: 2023-09-06 | Stop reason: SURG

## 2023-09-06 RX ORDER — FAMOTIDINE 10 MG/ML
20 INJECTION, SOLUTION INTRAVENOUS 2 TIMES DAILY
Status: DISCONTINUED | OUTPATIENT
Start: 2023-09-06 | End: 2023-09-08

## 2023-09-06 RX ORDER — FAMOTIDINE 20 MG/1
20 TABLET, FILM COATED ORAL 2 TIMES DAILY
Status: DISCONTINUED | OUTPATIENT
Start: 2023-09-06 | End: 2023-09-09

## 2023-09-06 RX ORDER — NICOTINE POLACRILEX 4 MG
30 LOZENGE BUCCAL
Status: DISCONTINUED | OUTPATIENT
Start: 2023-09-06 | End: 2023-09-09

## 2023-09-06 RX ORDER — DOXEPIN HYDROCHLORIDE 50 MG/1
1 CAPSULE ORAL DAILY
Status: DISCONTINUED | OUTPATIENT
Start: 2023-09-07 | End: 2023-09-09

## 2023-09-06 RX ORDER — CEFAZOLIN SODIUM/WATER 2 G/20 ML
2 SYRINGE (ML) INTRAVENOUS EVERY 8 HOURS
Status: COMPLETED | OUTPATIENT
Start: 2023-09-06 | End: 2023-09-07

## 2023-09-06 RX ORDER — MILRINONE LACTATE 0.2 MG/ML
INJECTION, SOLUTION INTRAVENOUS AS NEEDED
Status: DISCONTINUED | OUTPATIENT
Start: 2023-09-06 | End: 2023-09-08

## 2023-09-06 RX ORDER — BISACODYL 10 MG
10 SUPPOSITORY, RECTAL RECTAL
Status: DISCONTINUED | OUTPATIENT
Start: 2023-09-06 | End: 2023-09-09

## 2023-09-06 RX ORDER — ACETAMINOPHEN 10 MG/ML
1000 INJECTION, SOLUTION INTRAVENOUS EVERY 8 HOURS
Status: COMPLETED | OUTPATIENT
Start: 2023-09-06 | End: 2023-09-07

## 2023-09-06 RX ORDER — SENNOSIDES 8.6 MG
17.2 TABLET ORAL NIGHTLY PRN
Status: DISCONTINUED | OUTPATIENT
Start: 2023-09-06 | End: 2023-09-09

## 2023-09-06 RX ORDER — METOCLOPRAMIDE HYDROCHLORIDE 5 MG/ML
INJECTION INTRAMUSCULAR; INTRAVENOUS
Status: COMPLETED
Start: 2023-09-06 | End: 2023-09-06

## 2023-09-06 RX ORDER — DEXMEDETOMIDINE HYDROCHLORIDE 4 UG/ML
INJECTION, SOLUTION INTRAVENOUS CONTINUOUS
Status: DISCONTINUED | OUTPATIENT
Start: 2023-09-06 | End: 2023-09-06

## 2023-09-06 RX ORDER — NEOSTIGMINE METHYLSULFATE 1 MG/ML
3 INJECTION, SOLUTION INTRAVENOUS ONCE
Status: COMPLETED | OUTPATIENT
Start: 2023-09-06 | End: 2023-09-06

## 2023-09-06 RX ORDER — LIDOCAINE HYDROCHLORIDE 10 MG/ML
INJECTION, SOLUTION INFILTRATION; PERINEURAL
Status: COMPLETED | OUTPATIENT
Start: 2023-09-06 | End: 2023-09-06

## 2023-09-06 RX ORDER — GABAPENTIN 300 MG/1
300 CAPSULE ORAL DAILY
Status: COMPLETED | OUTPATIENT
Start: 2023-09-07 | End: 2023-09-08

## 2023-09-06 RX ORDER — MORPHINE SULFATE 4 MG/ML
4 INJECTION, SOLUTION INTRAMUSCULAR; INTRAVENOUS EVERY 2 HOUR PRN
Status: DISCONTINUED | OUTPATIENT
Start: 2023-09-06 | End: 2023-09-07

## 2023-09-06 RX ORDER — HYDROCODONE BITARTRATE AND ACETAMINOPHEN 5; 325 MG/1; MG/1
2 TABLET ORAL EVERY 4 HOURS PRN
Status: DISCONTINUED | OUTPATIENT
Start: 2023-09-06 | End: 2023-09-09

## 2023-09-06 RX ORDER — DEXTROSE MONOHYDRATE 25 G/50ML
50 INJECTION, SOLUTION INTRAVENOUS
Status: DISCONTINUED | OUTPATIENT
Start: 2023-09-06 | End: 2023-09-09

## 2023-09-06 RX ORDER — NITROGLYCERIN 20 MG/100ML
INJECTION INTRAVENOUS CONTINUOUS PRN
Status: DISCONTINUED | OUTPATIENT
Start: 2023-09-06 | End: 2023-09-06 | Stop reason: SURG

## 2023-09-06 RX ORDER — HYDROMORPHONE HYDROCHLORIDE 1 MG/ML
0.3 INJECTION, SOLUTION INTRAMUSCULAR; INTRAVENOUS; SUBCUTANEOUS
Status: DISCONTINUED | OUTPATIENT
Start: 2023-09-06 | End: 2023-09-08

## 2023-09-06 RX ORDER — MORPHINE SULFATE 2 MG/ML
2 INJECTION, SOLUTION INTRAMUSCULAR; INTRAVENOUS EVERY 2 HOUR PRN
Status: DISCONTINUED | OUTPATIENT
Start: 2023-09-06 | End: 2023-09-07

## 2023-09-06 RX ORDER — PROTAMINE SULFATE 10 MG/ML
INJECTION, SOLUTION INTRAVENOUS AS NEEDED
Status: DISCONTINUED | OUTPATIENT
Start: 2023-09-06 | End: 2023-09-06 | Stop reason: SURG

## 2023-09-06 RX ORDER — ALBUMIN, HUMAN INJ 5% 5 %
12.5 SOLUTION INTRAVENOUS ONCE
Status: COMPLETED | OUTPATIENT
Start: 2023-09-06 | End: 2023-09-06

## 2023-09-06 RX ORDER — HYDROCODONE BITARTRATE AND ACETAMINOPHEN 5; 325 MG/1; MG/1
1 TABLET ORAL EVERY 4 HOURS PRN
Status: DISCONTINUED | OUTPATIENT
Start: 2023-09-06 | End: 2023-09-09

## 2023-09-06 RX ORDER — VANCOMYCIN HYDROCHLORIDE 1 G/20ML
INJECTION, POWDER, LYOPHILIZED, FOR SOLUTION INTRAVENOUS AS NEEDED
Status: DISCONTINUED | OUTPATIENT
Start: 2023-09-06 | End: 2023-09-06 | Stop reason: HOSPADM

## 2023-09-06 RX ORDER — ACETAMINOPHEN 325 MG/1
650 TABLET ORAL EVERY 4 HOURS PRN
Status: DISCONTINUED | OUTPATIENT
Start: 2023-09-06 | End: 2023-09-09

## 2023-09-06 RX ORDER — ALBUMIN, HUMAN INJ 5% 5 %
12.5 SOLUTION INTRAVENOUS ONCE AS NEEDED
Status: COMPLETED | OUTPATIENT
Start: 2023-09-06 | End: 2023-09-06

## 2023-09-06 RX ORDER — ENEMA 19; 7 G/133ML; G/133ML
1 ENEMA RECTAL ONCE AS NEEDED
Status: DISCONTINUED | OUTPATIENT
Start: 2023-09-06 | End: 2023-09-09

## 2023-09-06 RX ORDER — ONDANSETRON 2 MG/ML
4 INJECTION INTRAMUSCULAR; INTRAVENOUS EVERY 6 HOURS PRN
Status: DISCONTINUED | OUTPATIENT
Start: 2023-09-06 | End: 2023-09-09

## 2023-09-06 RX ORDER — SODIUM CHLORIDE 9 MG/ML
83 INJECTION, SOLUTION INTRAVENOUS CONTINUOUS
Status: DISCONTINUED | OUTPATIENT
Start: 2023-09-06 | End: 2023-09-08

## 2023-09-06 RX ORDER — LATANOPROST 50 UG/ML
1 SOLUTION/ DROPS OPHTHALMIC NIGHTLY
Status: DISCONTINUED | OUTPATIENT
Start: 2023-09-06 | End: 2023-09-09

## 2023-09-06 RX ORDER — PHENYLEPHRINE HCL 10 MG/ML
VIAL (ML) INJECTION AS NEEDED
Status: DISCONTINUED | OUTPATIENT
Start: 2023-09-06 | End: 2023-09-06 | Stop reason: SURG

## 2023-09-06 RX ORDER — ASPIRIN 81 MG/1
81 TABLET ORAL DAILY
Status: DISCONTINUED | OUTPATIENT
Start: 2023-09-07 | End: 2023-09-09

## 2023-09-06 RX ORDER — MAGNESIUM SULFATE 1 G/100ML
1 INJECTION INTRAVENOUS AS NEEDED
Status: DISCONTINUED | OUTPATIENT
Start: 2023-09-06 | End: 2023-09-09

## 2023-09-06 RX ORDER — LORAZEPAM 1 MG/1
1 TABLET ORAL ONCE
Status: COMPLETED | OUTPATIENT
Start: 2023-09-06 | End: 2023-09-06

## 2023-09-06 RX ORDER — CEFAZOLIN SODIUM 1 G/3ML
INJECTION, POWDER, FOR SOLUTION INTRAMUSCULAR; INTRAVENOUS AS NEEDED
Status: DISCONTINUED | OUTPATIENT
Start: 2023-09-06 | End: 2023-09-06 | Stop reason: HOSPADM

## 2023-09-06 RX ORDER — EPHEDRINE SULFATE 50 MG/ML
INJECTION INTRAVENOUS AS NEEDED
Status: DISCONTINUED | OUTPATIENT
Start: 2023-09-06 | End: 2023-09-06 | Stop reason: SURG

## 2023-09-06 RX ORDER — PROCHLORPERAZINE EDISYLATE 5 MG/ML
5 INJECTION INTRAMUSCULAR; INTRAVENOUS EVERY 8 HOURS PRN
Status: DISCONTINUED | OUTPATIENT
Start: 2023-09-06 | End: 2023-09-09

## 2023-09-06 RX ORDER — FAMOTIDINE 10 MG/ML
INJECTION, SOLUTION INTRAVENOUS
Status: COMPLETED
Start: 2023-09-06 | End: 2023-09-06

## 2023-09-06 RX ORDER — MAGNESIUM SULFATE HEPTAHYDRATE 40 MG/ML
2 INJECTION, SOLUTION INTRAVENOUS AS NEEDED
Status: DISCONTINUED | OUTPATIENT
Start: 2023-09-06 | End: 2023-09-09

## 2023-09-06 RX ORDER — NICOTINE POLACRILEX 4 MG
15 LOZENGE BUCCAL
Status: DISCONTINUED | OUTPATIENT
Start: 2023-09-06 | End: 2023-09-09

## 2023-09-06 RX ORDER — CHLORHEXIDINE GLUCONATE 0.12 MG/ML
15 RINSE ORAL 2 TIMES DAILY
Status: DISCONTINUED | OUTPATIENT
Start: 2023-09-06 | End: 2023-09-09

## 2023-09-06 RX ORDER — ROCURONIUM BROMIDE 10 MG/ML
INJECTION, SOLUTION INTRAVENOUS AS NEEDED
Status: DISCONTINUED | OUTPATIENT
Start: 2023-09-06 | End: 2023-09-06 | Stop reason: SURG

## 2023-09-06 RX ORDER — POTASSIUM CHLORIDE 14.9 MG/ML
20 INJECTION INTRAVENOUS AS NEEDED
Status: DISCONTINUED | OUTPATIENT
Start: 2023-09-06 | End: 2023-09-09

## 2023-09-06 RX ADMIN — PHENYLEPHRINE HCL 100 MCG: 10 MG/ML VIAL (ML) INJECTION at 08:37:00

## 2023-09-06 RX ADMIN — DOBUTAMINE HYDROCHLORIDE 3 MCG/KG/MIN: 200 INJECTION INTRAVENOUS at 08:17:00

## 2023-09-06 RX ADMIN — ROCURONIUM BROMIDE 30 MG: 10 INJECTION, SOLUTION INTRAVENOUS at 10:25:00

## 2023-09-06 RX ADMIN — DEXMEDETOMIDINE HYDROCHLORIDE 0.6 MCG/KG/HR: 4 INJECTION, SOLUTION INTRAVENOUS at 08:47:00

## 2023-09-06 RX ADMIN — NITROGLYCERIN 8 MCG/MIN: 20 INJECTION INTRAVENOUS at 08:30:00

## 2023-09-06 RX ADMIN — NITROGLYCERIN 20 MCG/MIN: 20 INJECTION INTRAVENOUS at 10:56:00

## 2023-09-06 RX ADMIN — PHENYLEPHRINE HCL 100 MCG: 10 MG/ML VIAL (ML) INJECTION at 07:18:00

## 2023-09-06 RX ADMIN — ROCURONIUM BROMIDE 20 MG: 10 INJECTION, SOLUTION INTRAVENOUS at 10:16:00

## 2023-09-06 RX ADMIN — DOBUTAMINE HYDROCHLORIDE 1 MCG/KG/MIN: 200 INJECTION INTRAVENOUS at 10:38:00

## 2023-09-06 RX ADMIN — PHENYLEPHRINE HCL 100 MCG: 10 MG/ML VIAL (ML) INJECTION at 07:14:00

## 2023-09-06 RX ADMIN — SODIUM CHLORIDE: 9 INJECTION, SOLUTION INTRAVENOUS at 07:25:00

## 2023-09-06 RX ADMIN — DOBUTAMINE HYDROCHLORIDE 2 MCG/KG/MIN: 200 INJECTION INTRAVENOUS at 08:24:00

## 2023-09-06 RX ADMIN — DOBUTAMINE HYDROCHLORIDE 1 MCG/KG/MIN: 200 INJECTION INTRAVENOUS at 08:40:00

## 2023-09-06 RX ADMIN — NITROGLYCERIN 5 MCG/MIN: 20 INJECTION INTRAVENOUS at 08:45:00

## 2023-09-06 RX ADMIN — DEXMEDETOMIDINE HYDROCHLORIDE 0.7 MCG/KG/HR: 4 INJECTION, SOLUTION INTRAVENOUS at 07:45:00

## 2023-09-06 RX ADMIN — NITROGLYCERIN 5 MCG/MIN: 20 INJECTION INTRAVENOUS at 11:02:00

## 2023-09-06 RX ADMIN — NITROGLYCERIN 5 MCG/MIN: 20 INJECTION INTRAVENOUS at 10:44:00

## 2023-09-06 RX ADMIN — PHENYLEPHRINE HCL 100 MCG: 10 MG/ML VIAL (ML) INJECTION at 08:56:00

## 2023-09-06 RX ADMIN — NITROGLYCERIN 5 MCG/MIN: 20 INJECTION INTRAVENOUS at 08:16:00

## 2023-09-06 RX ADMIN — SODIUM CHLORIDE: 9 INJECTION, SOLUTION INTRAVENOUS at 08:23:00

## 2023-09-06 RX ADMIN — NITROGLYCERIN 20 MCG/MIN: 20 INJECTION INTRAVENOUS at 07:59:00

## 2023-09-06 RX ADMIN — NITROGLYCERIN 100 MCG/MIN: 20 INJECTION INTRAVENOUS at 08:59:00

## 2023-09-06 RX ADMIN — PHENYLEPHRINE HCL 200 MCG: 10 MG/ML VIAL (ML) INJECTION at 08:55:00

## 2023-09-06 RX ADMIN — NITROGLYCERIN 10 MCG/MIN: 20 INJECTION INTRAVENOUS at 09:01:00

## 2023-09-06 RX ADMIN — NITROGLYCERIN 10 MCG/MIN: 20 INJECTION INTRAVENOUS at 08:11:00

## 2023-09-06 RX ADMIN — PHENYLEPHRINE HCL 100 MCG: 10 MG/ML VIAL (ML) INJECTION at 08:44:00

## 2023-09-06 RX ADMIN — SODIUM CHLORIDE: 9 INJECTION, SOLUTION INTRAVENOUS at 12:01:00

## 2023-09-06 RX ADMIN — ROCURONIUM BROMIDE 60 MG: 10 INJECTION, SOLUTION INTRAVENOUS at 07:09:00

## 2023-09-06 RX ADMIN — CEFAZOLIN SODIUM/WATER 2 G: 2 G/20 ML SYRINGE (ML) INTRAVENOUS at 07:35:00

## 2023-09-06 RX ADMIN — EPHEDRINE SULFATE 10 MG: 50 INJECTION INTRAVENOUS at 08:56:00

## 2023-09-06 RX ADMIN — LIDOCAINE HYDROCHLORIDE 1 ML: 10 INJECTION, SOLUTION INFILTRATION; PERINEURAL at 07:05:00

## 2023-09-06 RX ADMIN — MIDAZOLAM HYDROCHLORIDE 2 MG: 1 INJECTION INTRAMUSCULAR; INTRAVENOUS at 07:02:00

## 2023-09-06 RX ADMIN — NITROGLYCERIN 15 MCG/MIN: 20 INJECTION INTRAVENOUS at 07:55:00

## 2023-09-06 RX ADMIN — SODIUM CHLORIDE: 9 INJECTION, SOLUTION INTRAVENOUS at 12:04:00

## 2023-09-06 RX ADMIN — EPHEDRINE SULFATE 5 MG: 50 INJECTION INTRAVENOUS at 07:37:00

## 2023-09-06 RX ADMIN — HEPARIN SODIUM 36000 UNITS: 1000 INJECTION, SOLUTION INTRAVENOUS; SUBCUTANEOUS at 08:36:00

## 2023-09-06 RX ADMIN — SODIUM CHLORIDE: 9 INJECTION, SOLUTION INTRAVENOUS at 10:38:00

## 2023-09-06 RX ADMIN — NITROGLYCERIN 15 MCG/MIN: 20 INJECTION INTRAVENOUS at 08:09:00

## 2023-09-06 RX ADMIN — NITROGLYCERIN 20 MCG/MIN: 20 INJECTION INTRAVENOUS at 07:51:00

## 2023-09-06 RX ADMIN — NITROGLYCERIN 50 MCG/MIN: 20 INJECTION INTRAVENOUS at 08:58:00

## 2023-09-06 RX ADMIN — LIDOCAINE HYDROCHLORIDE 50 MG: 10 INJECTION, SOLUTION EPIDURAL; INFILTRATION; INTRACAUDAL; PERINEURAL at 07:08:00

## 2023-09-06 RX ADMIN — SODIUM CHLORIDE: 9 INJECTION, SOLUTION INTRAVENOUS at 08:22:00

## 2023-09-06 RX ADMIN — ROCURONIUM BROMIDE 40 MG: 10 INJECTION, SOLUTION INTRAVENOUS at 08:21:00

## 2023-09-06 RX ADMIN — PHENYLEPHRINE HCL 100 MCG: 10 MG/ML VIAL (ML) INJECTION at 08:16:00

## 2023-09-06 RX ADMIN — PHENYLEPHRINE HCL 100 MCG: 10 MG/ML VIAL (ML) INJECTION at 10:59:00

## 2023-09-06 RX ADMIN — NITROGLYCERIN 20 MCG/MIN: 20 INJECTION INTRAVENOUS at 08:40:00

## 2023-09-06 RX ADMIN — NITROGLYCERIN 10 MCG/MIN: 20 INJECTION INTRAVENOUS at 08:24:00

## 2023-09-06 RX ADMIN — SODIUM CHLORIDE: 9 INJECTION, SOLUTION INTRAVENOUS at 09:16:00

## 2023-09-06 RX ADMIN — PROTAMINE SULFATE 360 MG: 10 INJECTION, SOLUTION INTRAVENOUS at 10:57:00

## 2023-09-06 RX ADMIN — PHENYLEPHRINE HCL 100 MCG: 10 MG/ML VIAL (ML) INJECTION at 08:11:00

## 2023-09-06 RX ADMIN — NITROGLYCERIN 10 MCG/MIN: 20 INJECTION INTRAVENOUS at 08:44:00

## 2023-09-06 RX ADMIN — SODIUM CHLORIDE: 9 INJECTION, SOLUTION INTRAVENOUS at 07:01:00

## 2023-09-06 RX ADMIN — SODIUM CHLORIDE: 9 INJECTION, SOLUTION INTRAVENOUS at 10:37:00

## 2023-09-06 NOTE — ANESTHESIA PROCEDURE NOTES
Arterial Line    Date/Time: 9/6/2023 7:05 AM    Performed by: Chalino An MD  Authorized by: Chalino An MD    General Information and Staff    Procedure Start:  9/6/2023 7:05 AM  Procedure End:  9/6/2023 7:07 AM  Anesthesiologist:  Chalino An MD  Performed By:  Anesthesiologist  Patient Location:  OR  Indication: continuous blood pressure monitoring and blood sampling needed    Site Identification: surface landmarks    Preanesthetic Checklist: 2 patient identifiers, IV checked, risks and benefits discussed, monitors and equipment checked, pre-op evaluation, timeout performed, anesthesia consent and sterile technique used    Procedure Details    Catheter Size:  20 G  Catheter Length:  1 and 3/4 inch  Catheter Type:  Arrow  Seldinger Technique?: Yes    Laterality:  Left  Site:  Radial artery  Site Prep: chlorhexidine    Line Secured:  Wrist Brace, tape and Tegaderm    Assessment    Events: patient tolerated procedure well with no complications      Medications  9/6/2023 7:05 AM  lidocaine injection 1% - Intradermal   1 mL - 9/6/2023 7:05:00 AM    Additional Comments    A-line placed easily first attempt using sterile technique, no hematoma.

## 2023-09-06 NOTE — CM/SW NOTE
Department  notified of request for gabrielle Dave referrals started. Assigned CM/SW to follow up with pt/family on further discharge planning.        Zohra Gallego   September 06, 2023   12:51

## 2023-09-06 NOTE — ANESTHESIA PROCEDURE NOTES
Airway  Date/Time: 9/6/2023 7:11 AM  Urgency: Elective    Airway not difficult    General Information and Staff    Patient location during procedure: OR  Anesthesiologist: Kita Barton MD  Performed: anesthesiologist   Performed by: Kita Barton MD  Authorized by: Kita Barton MD      Indications and Patient Condition  Indications for airway management: anesthesia  Spontaneous Ventilation: absent  Sedation level: deep  Preoxygenated: yes  Patient position: sniffing  Mask difficulty assessment: 1 - vent by mask  No planned trial extubation    Final Airway Details  Final airway type: endotracheal airway      Successful airway: ETT  Cuffed: yes   Successful intubation technique: direct laryngoscopy  Facilitating devices/methods: intubating stylet and cricoid pressure  Endotracheal tube insertion site: oral  Blade: Dorita  Blade size: #3  ETT size (mm): 7.5    Cormack-Lehane Classification: grade I - full view of glottis  Placement verified by: capnometry   Measured from: lips  ETT to lips (cm): 24  Number of attempts at approach: 1  Number of other approaches attempted: 0    Additional Comments  Intubated easily first attempt, no dental or soft tissue damage. No signs of aspiration.

## 2023-09-06 NOTE — ANESTHESIA PROCEDURE NOTES
Procedure Performed: LUIS MANUEL       Start Time:  9/6/2023 7:45 AM       End Time:   9/6/2023 11:30 AM    Preanesthesia Checklist:  Patient identified, IV assessed, risks and benefits discussed, monitors and equipment assessed, procedure being performed at surgeon's request and anesthesia consent obtained. General Procedure Information  Diagnostic Indications for Echo:  assessment of surgical repair and hemodynamic monitoring  Physician Requesting Echo: Maday Alaniz MD  Location performed:  OR  Intubated  Bite block placed  Heart visualized  Probe Insertion:  Easy    Echocardiographic and Doppler Measurements    Ventricles    Right Ventricle:  Cavity size normal.  Thrombus not present. Global function normal.    Left Ventricle:  Cavity size normal.  Thrombus not present. Global Function normal.  Ejection Fraction 59%. Ventricular Regional Function:  2- Basal Anterior:  normal  3- Basal Anterolateral:  normal  4- Basal Inferolateral:  normal  6- Basal Inferoseptal:  normal  7- Mid Anteroseptal:  normal  8- Mid Anterior:  normal  9- Mid Anterolateral:  normal  10- Mid Inferolateral:  normal  11- Mid Inferior:  normal  12- Mid Inferoseptal:  normal  14- Apical Lateral:  normal  16- Apical Septal:  normal  17- Bandon:  normal      Valves    Aortic Valve: Annulus normal.  Stenosis not present. Regurgitation absent. Leaflets normal.  Leaflet motions normal.      Mitral Valve: Annulus normal.  Stenosis not present. Regurgitation +1. Leaflets normal.  Leaflet motions normal.              Atria    Right Atrium:  Size normal.  Device present. Left Atrium:  Size normal.  Left atrial appendage normal.                  Anesthesia Information  Performed Personally  Anesthesiologist:  Pattie Salgado MD      Echocardiogram Comments:       Very mild AI pre-bypass    LVEF 60% post-bypass, normal wall motion.  Valves unchanged

## 2023-09-06 NOTE — PLAN OF CARE
Problem: Patient Centered Care  Goal: Patient preferences are identified and integrated in the patient's plan of care  Description: Interventions:  - What would you like us to know as we care for you?   - Provide timely, complete, and accurate information to patient/family  - Incorporate patient and family knowledge, values, beliefs, and cultural backgrounds into the planning and delivery of care  - Encourage patient/family to participate in care and decision-making at the level they choose  - Honor patient and family perspectives and choices  Outcome: Progressing     Problem: Diabetes/Glucose Control  Goal: Glucose maintained within prescribed range  Description: INTERVENTIONS:  - Monitor Blood Glucose as ordered  - Assess for signs and symptoms of hyperglycemia and hypoglycemia  - Administer ordered medications to maintain glucose within target range  - Assess barriers to adequate nutritional intake and initiate nutrition consult as needed  - Instruct patient on self management of diabetes  Outcome: Progressing     Problem: Patient/Family Goals  Goal: Patient/Family Long Term Goal  Description: Patient's Long Term Goal:     Interventions:  - See additional Care Plan goals for specific interventions  Outcome: Progressing  Goal: Patient/Family Short Term Goal  Description: Patient's Short Term Goal:     Interventions:   - See additional Care Plan goals for specific interventions  Outcome: Progressing

## 2023-09-06 NOTE — OR NURSING
CALLED AND SPOKE WITH PATIENT WIFE BELLE AT #[phone] AT 0133 TO GIVE UPDATE ON START OF PROCEDURE.

## 2023-09-06 NOTE — PROGRESS NOTES
Received patient from OR s/p CABG procedure with Dr. Ermelinda Lopez. AET: 2348. Lines on arrival to CCU: Left radial A-line, L hand PIV, berry, ETT, OG, CT x2, Right IJ cordis/swan, PW connected to temporary PM  Drips on arrival to CCU: amicar, insulin, precedex, levophed   SCDs applied. Joseph hugger applied. CXR completed. EKG completed. Labs collected and sent for processing. Potassium replaced. Extubated at 14:50 to 3L nasal cannula. [place] malpositioned per CXR- removed as ordered per Dr. Ermelinda Lopez.

## 2023-09-06 NOTE — ANESTHESIA PROCEDURE NOTES
Central Line    Date/Time: 9/6/2023 7:15 AM    Performed by: Emery Pittman MD  Authorized by: Emery Pittman MD    General Information and Staff    Procedure Start:  9/6/2023 7:15 AM  Procedure End:  9/6/2023 7:25 AM  Anesthesiologist:  Emery Pittman MD  Performed by:   Anesthesiologist  Patient Location:  OR  Indication: central venous access and CVP monitoring    Site Identification: real time ultrasound guided, surface landmarks and image stored and retrievable    Preanesthetic Checklist: 2 patient identifiers, IV checked, risks and benefits discussed, monitors and equipment checked, pre-op evaluation, timeout performed, anesthesia consent and sterile technique used    Procedure Detail    Patient Position:  Trendelenburg  Laterality:  Right  Site:  Internal jugular  Prep:  Chloraprep  Catheter Size:  9 Fr  Catheter Length (cm):  10  Catheter Type:  MAC introducer  Number of Lumens:  Double lumen  Oximetric Catheter?: Yes    Procedure Detail: target vein identified, needle advanced into vein and blood aspirated and guidewire advanced into vein    Seldinger Technique?: Yes    Intravenous Verification: verified by ultrasound and venous blood return    Post Insertion: all ports aspirated, all ports flushed easily, guidewire was removed intact, line was sutured in place and dressing was applied      Assessment    Events: patient tolerated procedure well with no complications      PA Catheter Placement    PA Catheter Placed?: Yes    PA Catheter Type:  Oximetric  PA Catheter Size:  8  Laterality:  Right  Site:  Internal jugular  Placement Confirmation: pressure tracing changes and verified by LUIS MANUEL    Events: patient tolerated procedure well with no complications      Additional Comments     PA catheter floated easily without resistance, not wedged

## 2023-09-06 NOTE — DIETARY NOTE
Deferred Cardiac Education Note    Consult received for diet education per protocol. Education deferred until pt transferred out of CCU or until s/p intervention and when appropriate for teaching.        Franklin Peralta RD, LDN  Clinical Dietitian  P: [phone]

## 2023-09-07 ENCOUNTER — APPOINTMENT (OUTPATIENT)
Dept: GENERAL RADIOLOGY | Facility: HOSPITAL | Age: 58
End: 2023-09-07
Attending: CLINICAL NURSE SPECIALIST
Payer: MEDICAID

## 2023-09-07 LAB
ANION GAP SERPL CALC-SCNC: 6 MMOL/L (ref 0–18)
ATRIAL RATE: 87 BPM
BASOPHILS # BLD AUTO: 0.03 X10(3) UL (ref 0–0.2)
BASOPHILS NFR BLD AUTO: 0.2 %
BUN BLD-MCNC: 9 MG/DL (ref 7–18)
BUN/CREAT SERPL: 10.6 (ref 10–20)
CALCIUM BLD-MCNC: 7.9 MG/DL (ref 8.5–10.1)
CHLORIDE SERPL-SCNC: 112 MMOL/L (ref 98–112)
CO2 SERPL-SCNC: 22 MMOL/L (ref 21–32)
CREAT BLD-MCNC: 0.85 MG/DL
DEPRECATED RDW RBC AUTO: 39 FL (ref 35.1–46.3)
EGFRCR SERPLBLD CKD-EPI 2021: 100 ML/MIN/1.73M2 (ref 60–?)
EOSINOPHIL # BLD AUTO: 0 X10(3) UL (ref 0–0.7)
EOSINOPHIL NFR BLD AUTO: 0 %
ERYTHROCYTE [DISTWIDTH] IN BLOOD BY AUTOMATED COUNT: 12.4 % (ref 11–15)
ERYTHROCYTE [SEDIMENTATION RATE] IN BLOOD: 13 MM/HR
GLUCOSE BLD-MCNC: 162 MG/DL (ref 70–99)
GLUCOSE BLDC GLUCOMTR-MCNC: 121 MG/DL (ref 70–99)
GLUCOSE BLDC GLUCOMTR-MCNC: 131 MG/DL (ref 70–99)
GLUCOSE BLDC GLUCOMTR-MCNC: 132 MG/DL (ref 70–99)
GLUCOSE BLDC GLUCOMTR-MCNC: 134 MG/DL (ref 70–99)
GLUCOSE BLDC GLUCOMTR-MCNC: 135 MG/DL (ref 70–99)
GLUCOSE BLDC GLUCOMTR-MCNC: 137 MG/DL (ref 70–99)
GLUCOSE BLDC GLUCOMTR-MCNC: 139 MG/DL (ref 70–99)
GLUCOSE BLDC GLUCOMTR-MCNC: 140 MG/DL (ref 70–99)
GLUCOSE BLDC GLUCOMTR-MCNC: 142 MG/DL (ref 70–99)
GLUCOSE BLDC GLUCOMTR-MCNC: 143 MG/DL (ref 70–99)
GLUCOSE BLDC GLUCOMTR-MCNC: 144 MG/DL (ref 70–99)
GLUCOSE BLDC GLUCOMTR-MCNC: 145 MG/DL (ref 70–99)
GLUCOSE BLDC GLUCOMTR-MCNC: 146 MG/DL (ref 70–99)
GLUCOSE BLDC GLUCOMTR-MCNC: 147 MG/DL (ref 70–99)
GLUCOSE BLDC GLUCOMTR-MCNC: 147 MG/DL (ref 70–99)
GLUCOSE BLDC GLUCOMTR-MCNC: 149 MG/DL (ref 70–99)
GLUCOSE BLDC GLUCOMTR-MCNC: 149 MG/DL (ref 70–99)
GLUCOSE BLDC GLUCOMTR-MCNC: 150 MG/DL (ref 70–99)
GLUCOSE BLDC GLUCOMTR-MCNC: 154 MG/DL (ref 70–99)
GLUCOSE BLDC GLUCOMTR-MCNC: 155 MG/DL (ref 70–99)
GLUCOSE BLDC GLUCOMTR-MCNC: 159 MG/DL (ref 70–99)
GLUCOSE BLDC GLUCOMTR-MCNC: 166 MG/DL (ref 70–99)
GLUCOSE BLDC GLUCOMTR-MCNC: 174 MG/DL (ref 70–99)
HCT VFR BLD AUTO: 35.1 %
HGB BLD-MCNC: 12.5 G/DL
IMM GRANULOCYTES # BLD AUTO: 0.09 X10(3) UL (ref 0–1)
IMM GRANULOCYTES NFR BLD: 0.5 %
LYMPHOCYTES # BLD AUTO: 0.83 X10(3) UL (ref 1–4)
LYMPHOCYTES NFR BLD AUTO: 4.2 %
MAGNESIUM SERPL-MCNC: 2.2 MG/DL (ref 1.6–2.6)
MCH RBC QN AUTO: 30.6 PG (ref 26–34)
MCHC RBC AUTO-ENTMCNC: 35.6 G/DL (ref 31–37)
MCV RBC AUTO: 86 FL
MONOCYTES # BLD AUTO: 1.49 X10(3) UL (ref 0.1–1)
MONOCYTES NFR BLD AUTO: 7.5 %
NEUTROPHILS # BLD AUTO: 17.51 X10 (3) UL (ref 1.5–7.7)
NEUTROPHILS # BLD AUTO: 17.51 X10(3) UL (ref 1.5–7.7)
NEUTROPHILS NFR BLD AUTO: 87.6 %
OSMOLALITY SERPL CALC.SUM OF ELEC: 292 MOSM/KG (ref 275–295)
P AXIS: 40 DEGREES
P-R INTERVAL: 174 MS
PLATELET # BLD AUTO: 190 10(3)UL (ref 150–450)
POTASSIUM SERPL-SCNC: 4 MMOL/L (ref 3.5–5.1)
Q-T INTERVAL: 432 MS
QRS DURATION: 140 MS
QTC CALCULATION (BEZET): 519 MS
R AXIS: -14 DEGREES
RBC # BLD AUTO: 4.08 X10(6)UL
SODIUM SERPL-SCNC: 140 MMOL/L (ref 136–145)
T AXIS: 73 DEGREES
VENTRICULAR RATE: 87 BPM
WBC # BLD AUTO: 20 X10(3) UL (ref 4–11)

## 2023-09-07 PROCEDURE — 71045 X-RAY EXAM CHEST 1 VIEW: CPT | Performed by: CLINICAL NURSE SPECIALIST

## 2023-09-07 PROCEDURE — 99232 SBSQ HOSP IP/OBS MODERATE 35: CPT | Performed by: INTERNAL MEDICINE

## 2023-09-07 RX ORDER — HYDRALAZINE HYDROCHLORIDE 25 MG/1
25 TABLET, FILM COATED ORAL EVERY 8 HOURS PRN
Status: DISCONTINUED | OUTPATIENT
Start: 2023-09-07 | End: 2023-09-09

## 2023-09-07 RX ORDER — HYDRALAZINE HYDROCHLORIDE 20 MG/ML
10 INJECTION INTRAMUSCULAR; INTRAVENOUS ONCE
Status: COMPLETED | OUTPATIENT
Start: 2023-09-07 | End: 2023-09-07

## 2023-09-07 RX ORDER — AMLODIPINE BESYLATE 5 MG/1
5 TABLET ORAL DAILY
Status: DISCONTINUED | OUTPATIENT
Start: 2023-09-07 | End: 2023-09-08

## 2023-09-07 RX ORDER — ACETAMINOPHEN 10 MG/ML
1000 INJECTION, SOLUTION INTRAVENOUS ONCE
Status: COMPLETED | OUTPATIENT
Start: 2023-09-07 | End: 2023-09-07

## 2023-09-07 RX ORDER — ROSUVASTATIN CALCIUM 20 MG/1
40 TABLET, COATED ORAL NIGHTLY
Status: DISCONTINUED | OUTPATIENT
Start: 2023-09-07 | End: 2023-09-09

## 2023-09-07 RX ORDER — HYDROMORPHONE HYDROCHLORIDE 1 MG/ML
0.2 INJECTION, SOLUTION INTRAMUSCULAR; INTRAVENOUS; SUBCUTANEOUS
Status: DISCONTINUED | OUTPATIENT
Start: 2023-09-07 | End: 2023-09-09

## 2023-09-07 NOTE — PLAN OF CARE
Problem: Diabetes/Glucose Control  Goal: Glucose maintained within prescribed range  Description: INTERVENTIONS:  - Monitor Blood Glucose as ordered  - Assess for signs and symptoms of hyperglycemia and hypoglycemia  - Administer ordered medications to maintain glucose within target range  - Assess barriers to adequate nutritional intake and initiate nutrition consult as needed  - Instruct patient on self management of diabetes  Outcome: Progressing     Problem: PAIN - ADULT  Goal: Verbalizes/displays adequate comfort level or patient's stated pain goal  Description: INTERVENTIONS:  - Encourage pt to monitor pain and request assistance  - Assess pain using appropriate pain scale  - Administer analgesics based on type and severity of pain and evaluate response  - Implement non-pharmacological measures as appropriate and evaluate response  - Consider cultural and social influences on pain and pain management  - Manage/alleviate anxiety  - Utilize distraction and/or relaxation techniques  - Monitor for opioid side effects  - Notify MD/LIP if interventions unsuccessful or patient reports new pain  - Anticipate increased pain with activity and pre-medicate as appropriate  Outcome: Progressing     Problem: SKIN/TISSUE INTEGRITY - ADULT  Goal: Skin integrity remains intact  Description: INTERVENTIONS  - Assess and document risk factors for pressure ulcer development  - Assess and document skin integrity  - Monitor for areas of redness and/or skin breakdown  - Initiate interventions, skin care algorithm/standards of care as needed  Outcome: Progressing  Goal: Incision(s), wounds(s) or drain site(s) healing without S/S of infection  Description: INTERVENTIONS:  - Assess and document risk factors for pressure ulcer development  - Assess and document skin integrity  - Assess and document dressing/incision, wound bed, drain sites and surrounding tissue  - Implement wound care per orders  - Initiate isolation precautions as appropriate  - Initiate Pressure Ulcer prevention bundle as indicated  Outcome: Progressing     Problem: CARDIOVASCULAR - ADULT  Goal: Maintains optimal cardiac output and hemodynamic stability  Description: INTERVENTIONS:  - Monitor vital signs, rhythm, and trends  - Monitor for bleeding, hypotension and signs of decreased cardiac output  - Evaluate effectiveness of vasoactive medications to optimize hemodynamic stability  - Monitor arterial and/or venous puncture sites for bleeding and/or hematoma  - Assess quality of pulses, skin color and temperature  - Assess for signs of decreased coronary artery perfusion - ex.  Angina  - Evaluate fluid balance, assess for edema, trend weights  Outcome: Progressing  Goal: Absence of cardiac arrhythmias or at baseline  Description: INTERVENTIONS:  - Continuous cardiac monitoring, monitor vital signs, obtain 12 lead EKG if indicated  - Evaluate effectiveness of antiarrhythmic and heart rate control medications as ordered  - Initiate emergency measures for life threatening arrhythmias  - Monitor electrolytes and administer replacement therapy as ordered  Outcome: Progressing

## 2023-09-07 NOTE — CM/SW NOTE
Department  asked to send updates to [facility] referral for [facility]. Assigned SW/CM to follow up with patient/family on discharge plan.        Joyce Bartholomew [facility]  9/7

## 2023-09-07 NOTE — PLAN OF CARE
Problem: Patient Centered Care  Goal: Patient preferences are identified and integrated in the patient's plan of care  Description: Interventions:  - What would you like us to know as we care for you?  Family very involved  - Provide timely, complete, and accurate information to patient/family  - Incorporate patient and family knowledge, values, beliefs, and cultural backgrounds into the planning and delivery of care  - Encourage patient/family to participate in care and decision-making at the level they choose  - Honor patient and family perspectives and choices  Outcome: Progressing     Problem: Diabetes/Glucose Control  Goal: Glucose maintained within prescribed range  Description: INTERVENTIONS:  - Monitor Blood Glucose as ordered  - Assess for signs and symptoms of hyperglycemia and hypoglycemia  - Administer ordered medications to maintain glucose within target range  - Assess barriers to adequate nutritional intake and initiate nutrition consult as needed  - Instruct patient on self management of diabetes  Outcome: Progressing     Problem: Patient/Family Goals  Goal: Patient/Family Long Term Goal  Description: Patient's Long Term Goal: get better    Interventions:  - See additional Care Plan goals for specific interventions  Outcome: Progressing  Goal: Patient/Family Short Term Goal  Description: Patient's Short Term Goal: be pain free    Interventions:   - PRN pain medication  -increase activity/ ambulation  - See additional Care Plan goals for specific interventions  Outcome: Progressing     Problem: PAIN - ADULT  Goal: Verbalizes/displays adequate comfort level or patient's stated pain goal  Description: INTERVENTIONS:  - Encourage pt to monitor pain and request assistance  - Assess pain using appropriate pain scale  - Administer analgesics based on type and severity of pain and evaluate response  - Implement non-pharmacological measures as appropriate and evaluate response  - Consider cultural and social influences on pain and pain management  - Manage/alleviate anxiety  - Utilize distraction and/or relaxation techniques  - Monitor for opioid side effects  - Notify MD/LIP if interventions unsuccessful or patient reports new pain  - Anticipate increased pain with activity and pre-medicate as appropriate  Outcome: Progressing     Problem: SKIN/TISSUE INTEGRITY - ADULT  Goal: Skin integrity remains intact  Description: INTERVENTIONS  - Assess and document risk factors for pressure ulcer development  - Assess and document skin integrity  - Monitor for areas of redness and/or skin breakdown  - Initiate interventions, skin care algorithm/standards of care as needed  Outcome: Progressing  Goal: Incision(s), wounds(s) or drain site(s) healing without S/S of infection  Description: INTERVENTIONS:  - Assess and document risk factors for pressure ulcer development  - Assess and document skin integrity  - Assess and document dressing/incision, wound bed, drain sites and surrounding tissue  - Implement wound care per orders  - Initiate isolation precautions as appropriate  - Initiate Pressure Ulcer prevention bundle as indicated  Outcome: Progressing     Problem: CARDIOVASCULAR - ADULT  Goal: Maintains optimal cardiac output and hemodynamic stability  Description: INTERVENTIONS:  - Monitor vital signs, rhythm, and trends  - Monitor for bleeding, hypotension and signs of decreased cardiac output  - Evaluate effectiveness of vasoactive medications to optimize hemodynamic stability  - Monitor arterial and/or venous puncture sites for bleeding and/or hematoma  - Assess quality of pulses, skin color and temperature  - Assess for signs of decreased coronary artery perfusion - ex.  Angina  - Evaluate fluid balance, assess for edema, trend weights  Outcome: Progressing  Goal: Absence of cardiac arrhythmias or at baseline  Description: INTERVENTIONS:  - Continuous cardiac monitoring, monitor vital signs, obtain 12 lead EKG if indicated  - Evaluate effectiveness of antiarrhythmic and heart rate control medications as ordered  - Initiate emergency measures for life threatening arrhythmias  - Monitor electrolytes and administer replacement therapy as ordered  Outcome: Progressing     Patient alert and oriented x4. On O2 at 3 liters pernasal cannula. Pacer wires intact. Pulses palpable. Afebrile. Started on nitroglycerin drip for SBP >140's and stopped by 6am. Patient medicated with Morphine and dilaudid for pain. Denies passing flatus. Complained of nausea with no vomiting. Zofran given. Sternal incision dry and intact. Able to make needs known. Call light within reach. Bed locked and in low position.

## 2023-09-07 NOTE — PLAN OF CARE
Notified by RN of up trending blood pressure since midnight. -160s. Pt denies pain. Plan to start Amlodipine 5mg daily.      JOSY Hough  9/7/2023  12:18 PM  Ph [phone] Kinsey Barats)  Ph [phone] [facility]

## 2023-09-07 NOTE — CM/SW NOTE
[facility] [address] is the only facility that accepted that is contracted with Arnulfo's insurance plan. Arnulfo notified and Rosalba reserved in [address] [place]. PLAN:  Home with [facility] [address] for RN services when medically cleared for discharge. / to remain available for support and/or discharge planning.       Rose Tony MBA BSN RN [address]  RN Case Manager  [phone]

## 2023-09-07 NOTE — PLAN OF CARE
Patient up in chair this AM. Dizzy and nauseous. PRNs utilized. Pain medications switched to oral with help of dizziness. Patient walked to desk and back X1. Family bedside and updated. CLD to start.     Problem: Diabetes/Glucose Control  Goal: Glucose maintained within prescribed range  Description: INTERVENTIONS:  - Monitor Blood Glucose as ordered  - Assess for signs and symptoms of hyperglycemia and hypoglycemia  - Administer ordered medications to maintain glucose within target range  - Assess barriers to adequate nutritional intake and initiate nutrition consult as needed  - Instruct patient on self management of diabetes  Outcome: Progressing     Problem: PAIN - ADULT  Goal: Verbalizes/displays adequate comfort level or patient's stated pain goal  Description: INTERVENTIONS:  - Encourage pt to monitor pain and request assistance  - Assess pain using appropriate pain scale  - Administer analgesics based on type and severity of pain and evaluate response  - Implement non-pharmacological measures as appropriate and evaluate response  - Consider cultural and social influences on pain and pain management  - Manage/alleviate anxiety  - Utilize distraction and/or relaxation techniques  - Monitor for opioid side effects  - Notify MD/LIP if interventions unsuccessful or patient reports new pain  - Anticipate increased pain with activity and pre-medicate as appropriate  Outcome: Progressing     Problem: SKIN/TISSUE INTEGRITY - ADULT  Goal: Skin integrity remains intact  Description: INTERVENTIONS  - Assess and document risk factors for pressure ulcer development  - Assess and document skin integrity  - Monitor for areas of redness and/or skin breakdown  - Initiate interventions, skin care algorithm/standards of care as needed  Outcome: Progressing     Problem: CARDIOVASCULAR - ADULT  Goal: Maintains optimal cardiac output and hemodynamic stability  Description: INTERVENTIONS:  - Monitor vital signs, rhythm, and trends  - Monitor for bleeding, hypotension and signs of decreased cardiac output  - Evaluate effectiveness of vasoactive medications to optimize hemodynamic stability  - Monitor arterial and/or venous puncture sites for bleeding and/or hematoma  - Assess quality of pulses, skin color and temperature  - Assess for signs of decreased coronary artery perfusion - ex.  Angina  - Evaluate fluid balance, assess for edema, trend weights  Outcome: Progressing  Goal: Absence of cardiac arrhythmias or at baseline  Description: INTERVENTIONS:  - Continuous cardiac monitoring, monitor vital signs, obtain 12 lead EKG if indicated  - Evaluate effectiveness of antiarrhythmic and heart rate control medications as ordered  - Initiate emergency measures for life threatening arrhythmias  - Monitor electrolytes and administer replacement therapy as ordered  Outcome: Progressing

## 2023-09-08 LAB
ANION GAP SERPL CALC-SCNC: 6 MMOL/L (ref 0–18)
ATRIAL RATE: 88 BPM
BASOPHILS # BLD AUTO: 0.02 X10(3) UL (ref 0–0.2)
BASOPHILS NFR BLD AUTO: 0.1 %
BLOOD TYPE BARCODE: 5100
BUN BLD-MCNC: 9 MG/DL (ref 7–18)
BUN/CREAT SERPL: 10.7 (ref 10–20)
CALCIUM BLD-MCNC: 8.4 MG/DL (ref 8.5–10.1)
CHLORIDE SERPL-SCNC: 106 MMOL/L (ref 98–112)
CO2 SERPL-SCNC: 28 MMOL/L (ref 21–32)
CREAT BLD-MCNC: 0.84 MG/DL
DEPRECATED RDW RBC AUTO: 45.3 FL (ref 35.1–46.3)
EGFRCR SERPLBLD CKD-EPI 2021: 100 ML/MIN/1.73M2 (ref 60–?)
EOSINOPHIL # BLD AUTO: 0 X10(3) UL (ref 0–0.7)
EOSINOPHIL NFR BLD AUTO: 0 %
ERYTHROCYTE [DISTWIDTH] IN BLOOD BY AUTOMATED COUNT: 13.3 % (ref 11–15)
GLUCOSE BLD-MCNC: 144 MG/DL (ref 70–99)
GLUCOSE BLDC GLUCOMTR-MCNC: 107 MG/DL (ref 70–99)
GLUCOSE BLDC GLUCOMTR-MCNC: 112 MG/DL (ref 70–99)
GLUCOSE BLDC GLUCOMTR-MCNC: 117 MG/DL (ref 70–99)
GLUCOSE BLDC GLUCOMTR-MCNC: 119 MG/DL (ref 70–99)
GLUCOSE BLDC GLUCOMTR-MCNC: 129 MG/DL (ref 70–99)
GLUCOSE BLDC GLUCOMTR-MCNC: 131 MG/DL (ref 70–99)
GLUCOSE BLDC GLUCOMTR-MCNC: 133 MG/DL (ref 70–99)
GLUCOSE BLDC GLUCOMTR-MCNC: 139 MG/DL (ref 70–99)
GLUCOSE BLDC GLUCOMTR-MCNC: 139 MG/DL (ref 70–99)
GLUCOSE BLDC GLUCOMTR-MCNC: 140 MG/DL (ref 70–99)
GLUCOSE BLDC GLUCOMTR-MCNC: 141 MG/DL (ref 70–99)
GLUCOSE BLDC GLUCOMTR-MCNC: 141 MG/DL (ref 70–99)
GLUCOSE BLDC GLUCOMTR-MCNC: 143 MG/DL (ref 70–99)
GLUCOSE BLDC GLUCOMTR-MCNC: 145 MG/DL (ref 70–99)
GLUCOSE BLDC GLUCOMTR-MCNC: 148 MG/DL (ref 70–99)
GLUCOSE BLDC GLUCOMTR-MCNC: 164 MG/DL (ref 70–99)
HCT VFR BLD AUTO: 35.9 %
HGB BLD-MCNC: 12.1 G/DL
IMM GRANULOCYTES # BLD AUTO: 0.17 X10(3) UL (ref 0–1)
IMM GRANULOCYTES NFR BLD: 0.8 %
LYMPHOCYTES # BLD AUTO: 1.63 X10(3) UL (ref 1–4)
LYMPHOCYTES NFR BLD AUTO: 7.9 %
MCH RBC QN AUTO: 30.8 PG (ref 26–34)
MCHC RBC AUTO-ENTMCNC: 33.7 G/DL (ref 31–37)
MCV RBC AUTO: 91.3 FL
MONOCYTES # BLD AUTO: 1.92 X10(3) UL (ref 0.1–1)
MONOCYTES NFR BLD AUTO: 9.3 %
NEUTROPHILS # BLD AUTO: 16.85 X10 (3) UL (ref 1.5–7.7)
NEUTROPHILS # BLD AUTO: 16.85 X10(3) UL (ref 1.5–7.7)
NEUTROPHILS NFR BLD AUTO: 81.9 %
OSMOLALITY SERPL CALC.SUM OF ELEC: 291 MOSM/KG (ref 275–295)
P AXIS: 43 DEGREES
P-R INTERVAL: 168 MS
PLATELET # BLD AUTO: 196 10(3)UL (ref 150–450)
POTASSIUM SERPL-SCNC: 4.4 MMOL/L (ref 3.5–5.1)
Q-T INTERVAL: 380 MS
QRS DURATION: 96 MS
QTC CALCULATION (BEZET): 459 MS
R AXIS: -10 DEGREES
RBC # BLD AUTO: 3.93 X10(6)UL
SODIUM SERPL-SCNC: 140 MMOL/L (ref 136–145)
T AXIS: 55 DEGREES
UNIT VOLUME: 350 ML
VENTRICULAR RATE: 88 BPM
WBC # BLD AUTO: 20.6 X10(3) UL (ref 4–11)

## 2023-09-08 RX ORDER — FUROSEMIDE 10 MG/ML
20 INJECTION INTRAMUSCULAR; INTRAVENOUS DAILY
Status: DISCONTINUED | OUTPATIENT
Start: 2023-09-08 | End: 2023-09-08

## 2023-09-08 RX ORDER — FUROSEMIDE 10 MG/ML
20 INJECTION INTRAMUSCULAR; INTRAVENOUS
Status: DISCONTINUED | OUTPATIENT
Start: 2023-09-08 | End: 2023-09-09

## 2023-09-08 RX ORDER — METOPROLOL TARTRATE 50 MG/1
50 TABLET, FILM COATED ORAL
Status: DISCONTINUED | OUTPATIENT
Start: 2023-09-08 | End: 2023-09-09

## 2023-09-08 NOTE — PLAN OF CARE
Bernardo Cano Patient Status:  Inpatient    5/10/1965 MRN [id number]   [facility] 2W/SW Attending Barb Tamayo,    Hosp Day # 1 PCP Brenden Flanagan MD     Cardiology Nocturnal APN Plan of Care     Page Received: ICU RN [id number]    Patient is POD1 CABG4 with fever despite tylenol. Patient is extubated. CXR showing new pleural effusions / atelectasis. Challenge with IS s/t CT discomfort.         Assessment/Plan:   - IV tylenol x 1  - Lidopatch around CT as able to help control pain  - No CT output per RN this shift, likely able to pull in AM.  If pleurals are present, keep to suction ISO new effusions.   - Encourage IS / acapella   - PRN atrovent; avoid duoneb with tachycardia   - F/U CXR and WBC in AM         Tika Arciniega, [address]  2023  11:35 PM

## 2023-09-08 NOTE — DIETARY NOTE
NUTRITION EDUCATION NOTE     Received consult for cardiac nutrition education. Pt sleeping upon visit. Dtr present in room, did not converse much. Left Eating Heart-Healthy and NCM handout in room for pt to take home. Receptive to instruction. Would benefit from outpt f/u. Expect fair compliance.         Cecile Howard RD, LDN  Clinical Dietitian  P: [phone]

## 2023-09-08 NOTE — CARDIAC REHAB
Cardiac Rehab Phase I    Activity:   Chair: yes   Ambulation: yes. Walked this am per Travis Duncan RN   Assistive Device: walker   Assistance needed: one   Patient tolerated activity: yes. Shower Date: 9/9/23     Education:  Handouts provided and reviewed: Heart Surgery Binder. Patient instructed on: I.S. / Acapella, Cough and Deep Breathe, Incision Care, Infection Prevention, and Pain Scale Instruction. Diet: Healthy Cardiac diet reviewed. Disease Process: Disease process reviewed. Weight Monitoring: Instructed on daily weights. Scale at home. I.S. and/or Acapella: Patient instructed on incentive spirometer and/or acapella with good return demonstration. Achieved 1500ml on I.S. Infection: Reviewed signs and symptoms of infection. Infection prevention and when to notify MD. Tuan Griffin at home. Betadine and soap given to pt for home use. Reviewed incisional cleaning method with pt and daughter. Pt has shower chair at home.

## 2023-09-08 NOTE — PHYSICAL THERAPY NOTE
PHYSICAL THERAPY EVALUATION - INPATIENT     Room Number: 764/039-B  Evaluation Date: 9/8/2023  Type of Evaluation: Initial   Physician Order: PT Eval and Treat    Presenting Problem: CABG x4  Co-Morbidities : DM, HTN, sleep apnea, visual impairment  Reason for Therapy: Mobility Dysfunction and Discharge Planning    PHYSICAL THERAPY ASSESSMENT     Patient is a 60year old male admitted 9/6/2023 for CABGx4; to OR  on 9/6. Pt ok to be seen per RN Thelma Portillo. Pt willing to participate. Pt educ in sternal precautions, pt endorsed understanding. Pt amb with cardiac cart with O2, pt is not on O2 at home. Pt does have O2 monitor for home. Pt amb in hallway with inconsistent foot placement, slight knee buckle, but no LOB. Pt with large step length, cued to dec step length for inc control, pt denied need and continued amb in current pattern. Pt demo'd safety with turning with cardiac cart. Pt will most likely need RW for d/c. Pt participatory in seated and standing therex, cued for safety when performing mini-squats. After educ and demo, pt performed full squat down into a catcher's position. Pt informed that that was not necessary or safe, again educ in proper mini-squat technique. Pt did require intermittent cueing for therex. Patient's current functional deficits include gait, stairs, which are below the patient's pre-admission status. Rec continued inpt PT to progress ambulation and therex; rec d/c home with HHPT. Pt was a  and has a good grasp on muscular activation, but needs cueing for safety. The patient's Approx Degree of Impairment: 46.58% has been calculated based on documentation in the [facility] '6 clicks' Inpatient Basic Mobility Short Form. Research supports that patients with this level of impairment may benefit from home with HHPT. Patient will benefit from continued IP PT services to address these deficits in preparation for discharge.     DISCHARGE RECOMMENDATIONS  PT Discharge Recommendations: Home with home health PT    PLAN  PT Treatment Plan: Bed mobility; Body mechanics; Endurance; Energy conservation;Patient education; Family education;Gait training;Strengthening;Stair training;Stoop training;Transfer training;Balance training  Rehab Potential : Good  Frequency (Obs): 5x/week       PHYSICAL THERAPY MEDICAL/SOCIAL HISTORY     History related to current admission: Chest pain, MI     Problem List  Active Problems:    Coronary artery disease involving native coronary artery of native heart      HOME SITUATION  Home Situation  Type of Home: House  Home Layout: One level  Stairs to Enter : 1  Railing: No  Stairs to Bedroom: 0  Lives With: Spouse;Daughter (15yo dtr at bedside with pt)  Drives: Yes  Patient Owned Equipment:  (just got a seat for shower)  Patient Regularly Uses: Glasses     Prior Level of [place]: indep in ADLs and mobility    SUBJECTIVE  I feel fine.     PHYSICAL THERAPY EXAMINATION     OBJECTIVE  Precautions: Sternal;Cardiac  Fall Risk: Standard fall risk    WEIGHT BEARING RESTRICTION                PAIN ASSESSMENT  Ratin  Location: denies       COGNITION  Overall Cognitive Status:  WFL - within functional limits  Safety Judgement:  decreased awareness of need for safety    RANGE OF MOTION AND STRENGTH ASSESSMENT  Upper extremity ROM and strength are within functional limits   Lower extremity ROM is within functional limits   Lower extremity strength is within functional limits     BALANCE  Static Sitting: Fair +  Dynamic Sitting: Fair  Static Standing: Fair  Dynamic Standing: Fair -    ADDITIONAL TESTS                                    NEUROLOGICAL FINDINGS                      ACTIVITY TOLERANCE  Pulse: 100  Heart Rate Source: Monitor     BP: 148/78  BP Location: Radial  BP Method: Automatic  Patient Position: Lying    O2 WALK  Oxygen Therapy  SPO2% on Oxygen at Rest: 94  At rest oxygen flow (liters per minute): 4  SPO2% Ambulation on Oxygen: 92  Ambulation oxygen flow (liters per minute): 4    AM-PAC '6-Clicks' INPATIENT SHORT FORM - BASIC MOBILITY  How much difficulty does the patient currently have. .. Patient Difficulty: Turning over in bed (including adjusting bedclothes, sheets and blankets)?: A Little   Patient Difficulty: Sitting down on and standing up from a chair with arms (e.g., wheelchair, bedside commode, etc.): A Little   Patient Difficulty: Moving from lying on back to sitting on the side of the bed?: A Little   How much help from another person does the patient currently need. .. Help from Another: Moving to and from a bed to a chair (including a wheelchair)?: A Little   Help from Another: Need to walk in hospital room?: A Little   Help from Another: Climbing 3-5 steps with a railing?: A Little     AM-PAC Score:  Raw Score: 18   Approx Degree of Impairment: 46.58%   Standardized Score (AM-PAC Scale): 43.63   CMS Modifier (G-Code): CK    FUNCTIONAL ABILITY STATUS  Functional Mobility/Gait Assessment  Gait Assistance: Contact guard assist  Distance (ft): 120  Assistive Device: Other (Comment) (cardiac cart)  Pattern:  (did not want to shorten step length; inconsistent foot placement)    Bed Mobility: NT, verbally reviewed with pt and pt endorsed understanding    Transfers: CGA to supv    Exercise/Education Provided:  Gait training  Strengthening  Lower therapeutic exercise: Alternating marching  Ankle pumps  Glut sets  Heel raises  Knee extension  LAQ  Mini squats  Transfer training  PT eval    Patient End of Session: Up in chair;Needs met;Call light within reach;RN aware of session/findings; All patient questions and concerns addressed; Family present    CURRENT GOALS    Goals to be met by: 9/12/23  Patient Goal Patient's self-stated goal is: go home   Goal #1 Patient is able to demonstrate supine - sit EOB @ level: modified independent     Goal #1   Current Status    Goal #2 Patient is able to demonstrate transfers Sit to/from Stand at assistance level: modified independent with walker - rolling     Goal #2  Current Status    Goal #3 Patient is able to ambulate 300 feet with assist device: walker - rolling at assistance level: modified independent   Goal #3   Current Status    Goal #4 Patient will negotiate 1 stairs/one curb w/ assistive device and supervision   Goal #4   Current Status    Goal #5 Patient to demonstrate independence with home activity/exercise instructions provided to patient in preparation for discharge.    Goal #5   Current Status    Goal #6    Goal #6  Current Status      Patient Evaluation Complexity Level:  History Moderate - 1 or 2 personal factors and/or co-morbidities   Examination of body systems Moderate - addressing a total of 3 or more elements   Clinical Presentation Moderate - Evolving   Clinical Decision Making Moderate Complexity       Gait Training: 15 minutes  Therapeutic Activity: 10 minutes

## 2023-09-08 NOTE — PLAN OF CARE
Kelley, chest tubes and cortis removed today. Transitioned off of insulin gtt this afternoon. Tolerating regular diet. Up ambulating halls x3. Very impulsive trying to self transfer in room multiple times. Frequently reminded to use call light. Bed alarm on. Oxygen titrated off. Occasionally requiring 2L more for comfort. Given norco x1 for pain control.     Problem: Diabetes/Glucose Control  Goal: Glucose maintained within prescribed range  Description: INTERVENTIONS:  - Monitor Blood Glucose as ordered  - Assess for signs and symptoms of hyperglycemia and hypoglycemia  - Administer ordered medications to maintain glucose within target range  - Assess barriers to adequate nutritional intake and initiate nutrition consult as needed  - Instruct patient on self management of diabetes  Outcome: Progressing     Problem: PAIN - ADULT  Goal: Verbalizes/displays adequate comfort level or patient's stated pain goal  Description: INTERVENTIONS:  - Encourage pt to monitor pain and request assistance  - Assess pain using appropriate pain scale  - Administer analgesics based on type and severity of pain and evaluate response  - Implement non-pharmacological measures as appropriate and evaluate response  - Consider cultural and social influences on pain and pain management  - Manage/alleviate anxiety  - Utilize distraction and/or relaxation techniques  - Monitor for opioid side effects  - Notify MD/LIP if interventions unsuccessful or patient reports new pain  - Anticipate increased pain with activity and pre-medicate as appropriate  Outcome: Progressing     Problem: SKIN/TISSUE INTEGRITY - ADULT  Goal: Skin integrity remains intact  Description: INTERVENTIONS  - Assess and document risk factors for pressure ulcer development  - Assess and document skin integrity  - Monitor for areas of redness and/or skin breakdown  - Initiate interventions, skin care algorithm/standards of care as needed  Outcome: Progressing  Goal: Incision(s), wounds(s) or drain site(s) healing without S/S of infection  Description: INTERVENTIONS:  - Assess and document risk factors for pressure ulcer development  - Assess and document skin integrity  - Assess and document dressing/incision, wound bed, drain sites and surrounding tissue  - Implement wound care per orders  - Initiate isolation precautions as appropriate  - Initiate Pressure Ulcer prevention bundle as indicated  Outcome: Progressing     Problem: CARDIOVASCULAR - ADULT  Goal: Maintains optimal cardiac output and hemodynamic stability  Description: INTERVENTIONS:  - Monitor vital signs, rhythm, and trends  - Monitor for bleeding, hypotension and signs of decreased cardiac output  - Evaluate effectiveness of vasoactive medications to optimize hemodynamic stability  - Monitor arterial and/or venous puncture sites for bleeding and/or hematoma  - Assess quality of pulses, skin color and temperature  - Assess for signs of decreased coronary artery perfusion - ex.  Angina  - Evaluate fluid balance, assess for edema, trend weights  Outcome: Progressing  Goal: Absence of cardiac arrhythmias or at baseline  Description: INTERVENTIONS:  - Continuous cardiac monitoring, monitor vital signs, obtain 12 lead EKG if indicated  - Evaluate effectiveness of antiarrhythmic and heart rate control medications as ordered  - Initiate emergency measures for life threatening arrhythmias  - Monitor electrolytes and administer replacement therapy as ordered  Outcome: Progressing

## 2023-09-08 NOTE — PLAN OF CARE
Patient alert and oriented. Febrile overnight, additional dose of IV tylenol ordered and sed rate collected. PRN pain medications utilized frequently for patient discomfort. Patient is currently on 6L. Education provided on incentive spirometer use various times. PRN nebs added. Chest tubes in place and patent. Surgical dressings clean, dry, and intact. Kelley in place, catheter care completed. Plan of care reviewed, will endorse care.      Current drips infusing:  Insulin: Algo 4, 4 units/hr  D5.45 @ 40 mL/hr    Output for last 12 hours:  Chest tubes: 30 mL  Urine: 720 mL    Problem: Patient Centered Care  Goal: Patient preferences are identified and integrated in the patient's plan of care  Description: Interventions:  - What would you like us to know as we care for you?   - Provide timely, complete, and accurate information to patient/family  - Incorporate patient and family knowledge, values, beliefs, and cultural backgrounds into the planning and delivery of care  - Encourage patient/family to participate in care and decision-making at the level they choose  - Honor patient and family perspectives and choices  Outcome: Progressing     Problem: Diabetes/Glucose Control  Goal: Glucose maintained within prescribed range  Description: INTERVENTIONS:  - Monitor Blood Glucose as ordered  - Assess for signs and symptoms of hyperglycemia and hypoglycemia  - Administer ordered medications to maintain glucose within target range  - Assess barriers to adequate nutritional intake and initiate nutrition consult as needed  - Instruct patient on self management of diabetes  Outcome: Progressing     Problem: PAIN - ADULT  Goal: Verbalizes/displays adequate comfort level or patient's stated pain goal  Description: INTERVENTIONS:  - Encourage pt to monitor pain and request assistance  - Assess pain using appropriate pain scale  - Administer analgesics based on type and severity of pain and evaluate response  - Implement non-pharmacological measures as appropriate and evaluate response  - Consider cultural and social influences on pain and pain management  - Manage/alleviate anxiety  - Utilize distraction and/or relaxation techniques  - Monitor for opioid side effects  - Notify MD/LIP if interventions unsuccessful or patient reports new pain  - Anticipate increased pain with activity and pre-medicate as appropriate  Outcome: Progressing     Problem: CARDIOVASCULAR - ADULT  Goal: Maintains optimal cardiac output and hemodynamic stability  Description: INTERVENTIONS:  - Monitor vital signs, rhythm, and trends  - Monitor for bleeding, hypotension and signs of decreased cardiac output  - Evaluate effectiveness of vasoactive medications to optimize hemodynamic stability  - Monitor arterial and/or venous puncture sites for bleeding and/or hematoma  - Assess quality of pulses, skin color and temperature  - Assess for signs of decreased coronary artery perfusion - ex.  Angina  - Evaluate fluid balance, assess for edema, trend weights  Outcome: Progressing  Goal: Absence of cardiac arrhythmias or at baseline  Description: INTERVENTIONS:  - Continuous cardiac monitoring, monitor vital signs, obtain 12 lead EKG if indicated  - Evaluate effectiveness of antiarrhythmic and heart rate control medications as ordered  - Initiate emergency measures for life threatening arrhythmias  - Monitor electrolytes and administer replacement therapy as ordered  Outcome: Progressing

## 2023-09-09 ENCOUNTER — APPOINTMENT (OUTPATIENT)
Dept: GENERAL RADIOLOGY | Facility: HOSPITAL | Age: 58
End: 2023-09-09
Attending: NURSE PRACTITIONER
Payer: MEDICAID

## 2023-09-09 ENCOUNTER — APPOINTMENT (OUTPATIENT)
Dept: GENERAL RADIOLOGY | Facility: HOSPITAL | Age: 58
End: 2023-09-09
Attending: STUDENT IN AN ORGANIZED HEALTH CARE EDUCATION/TRAINING PROGRAM
Payer: MEDICAID

## 2023-09-09 ENCOUNTER — HOSPITAL ENCOUNTER (EMERGENCY)
Facility: HOSPITAL | Age: 58
Discharge: HOME | End: 2023-09-10
Attending: STUDENT IN AN ORGANIZED HEALTH CARE EDUCATION/TRAINING PROGRAM
Payer: MEDICAID

## 2023-09-09 VITALS
OXYGEN SATURATION: 94 % | DIASTOLIC BLOOD PRESSURE: 78 MMHG | TEMPERATURE: 98 F | SYSTOLIC BLOOD PRESSURE: 120 MMHG | HEART RATE: 85 BPM | RESPIRATION RATE: 23 BRPM

## 2023-09-09 VITALS
DIASTOLIC BLOOD PRESSURE: 89 MMHG | HEART RATE: 96 BPM | OXYGEN SATURATION: 93 % | TEMPERATURE: 99 F | WEIGHT: 200.63 LBS | HEIGHT: 70 IN | BODY MASS INDEX: 28.72 KG/M2 | RESPIRATION RATE: 22 BRPM | SYSTOLIC BLOOD PRESSURE: 133 MMHG

## 2023-09-09 DIAGNOSIS — Z48.89 ENCOUNTER FOR POSTOPERATIVE WOUND CHECK: Primary | ICD-10-CM

## 2023-09-09 LAB
ANION GAP SERPL CALC-SCNC: 5 MMOL/L (ref 0–18)
BUN BLD-MCNC: 13 MG/DL (ref 7–18)
BUN/CREAT SERPL: 15.7 (ref 10–20)
CALCIUM BLD-MCNC: 8.4 MG/DL (ref 8.5–10.1)
CHLORIDE SERPL-SCNC: 102 MMOL/L (ref 98–112)
CO2 SERPL-SCNC: 32 MMOL/L (ref 21–32)
CREAT BLD-MCNC: 0.83 MG/DL
DEPRECATED RDW RBC AUTO: 42.3 FL (ref 35.1–46.3)
EGFRCR SERPLBLD CKD-EPI 2021: 101 ML/MIN/1.73M2 (ref 60–?)
ERYTHROCYTE [DISTWIDTH] IN BLOOD BY AUTOMATED COUNT: 13 % (ref 11–15)
GLUCOSE BLD-MCNC: 114 MG/DL (ref 70–99)
GLUCOSE BLDC GLUCOMTR-MCNC: 129 MG/DL (ref 70–99)
HCT VFR BLD AUTO: 34.1 %
HGB BLD-MCNC: 11.7 G/DL
MAGNESIUM SERPL-MCNC: 2.4 MG/DL (ref 1.6–2.6)
MCH RBC QN AUTO: 30.4 PG (ref 26–34)
MCHC RBC AUTO-ENTMCNC: 34.3 G/DL (ref 31–37)
MCV RBC AUTO: 88.6 FL
OSMOLALITY SERPL CALC.SUM OF ELEC: 289 MOSM/KG (ref 275–295)
PLATELET # BLD AUTO: 174 10(3)UL (ref 150–450)
POTASSIUM SERPL-SCNC: 3.4 MMOL/L (ref 3.5–5.1)
RBC # BLD AUTO: 3.85 X10(6)UL
SODIUM SERPL-SCNC: 139 MMOL/L (ref 136–145)
WBC # BLD AUTO: 15.8 X10(3) UL (ref 4–11)

## 2023-09-09 PROCEDURE — 99283 EMERGENCY DEPT VISIT LOW MDM: CPT

## 2023-09-09 PROCEDURE — 71046 X-RAY EXAM CHEST 2 VIEWS: CPT | Performed by: NURSE PRACTITIONER

## 2023-09-09 PROCEDURE — 71045 X-RAY EXAM CHEST 1 VIEW: CPT | Performed by: STUDENT IN AN ORGANIZED HEALTH CARE EDUCATION/TRAINING PROGRAM

## 2023-09-09 PROCEDURE — 99285 EMERGENCY DEPT VISIT HI MDM: CPT

## 2023-09-09 RX ORDER — POTASSIUM CHLORIDE 20 MEQ/1
40 TABLET, EXTENDED RELEASE ORAL EVERY 4 HOURS
Status: COMPLETED | OUTPATIENT
Start: 2023-09-09 | End: 2023-09-09

## 2023-09-09 RX ORDER — CLOPIDOGREL BISULFATE 75 MG/1
75 TABLET ORAL DAILY
Qty: 30 TABLET | Refills: 1 | Status: SHIPPED | OUTPATIENT
Start: 2023-09-10

## 2023-09-09 RX ORDER — FUROSEMIDE 20 MG/1
20 TABLET ORAL DAILY
Qty: 5 TABLET | Refills: 0 | Status: SHIPPED | OUTPATIENT
Start: 2023-09-09 | End: 2023-09-14

## 2023-09-09 RX ORDER — ASCORBIC ACID 500 MG
500 TABLET ORAL 2 TIMES DAILY
Qty: 60 TABLET | Refills: 0 | Status: SHIPPED | OUTPATIENT
Start: 2023-09-09

## 2023-09-09 RX ORDER — POTASSIUM CHLORIDE 20 MEQ/1
20 TABLET, EXTENDED RELEASE ORAL DAILY
Qty: 5 TABLET | Refills: 0 | Status: SHIPPED | OUTPATIENT
Start: 2023-09-09 | End: 2023-09-14

## 2023-09-09 RX ORDER — FAMOTIDINE 20 MG/1
20 TABLET, FILM COATED ORAL 2 TIMES DAILY
Qty: 60 TABLET | Refills: 0 | Status: SHIPPED | OUTPATIENT
Start: 2023-09-09

## 2023-09-09 RX ORDER — ROSUVASTATIN CALCIUM 40 MG/1
40 TABLET, COATED ORAL NIGHTLY
Qty: 30 TABLET | Refills: 3 | Status: SHIPPED | OUTPATIENT
Start: 2023-09-09

## 2023-09-09 RX ORDER — DOXEPIN HYDROCHLORIDE 50 MG/1
1 CAPSULE ORAL DAILY
Qty: 30 TABLET | Refills: 0 | Status: SHIPPED | OUTPATIENT
Start: 2023-09-09

## 2023-09-09 RX ORDER — ASPIRIN 81 MG/1
81 TABLET ORAL DAILY
Qty: 30 TABLET | Refills: 3 | Status: SHIPPED | OUTPATIENT
Start: 2023-09-10

## 2023-09-09 RX ORDER — METOPROLOL TARTRATE 100 MG/1
100 TABLET ORAL
Status: DISCONTINUED | OUTPATIENT
Start: 2023-09-09 | End: 2023-09-09

## 2023-09-09 RX ORDER — HYDROCODONE BITARTRATE AND ACETAMINOPHEN 5; 325 MG/1; MG/1
2 TABLET ORAL EVERY 4 HOURS PRN
Qty: 30 TABLET | Refills: 0 | Status: SHIPPED | OUTPATIENT
Start: 2023-09-09

## 2023-09-09 RX ORDER — METOPROLOL TARTRATE 100 MG/1
100 TABLET ORAL
Qty: 60 TABLET | Refills: 1 | Status: SHIPPED | OUTPATIENT
Start: 2023-09-09

## 2023-09-09 RX ORDER — METOPROLOL TARTRATE 100 MG/1
100 TABLET ORAL
Qty: 60 TABLET | Refills: 1 | Status: SHIPPED | OUTPATIENT
Start: 2023-09-09 | End: 2023-09-09

## 2023-09-09 NOTE — CM/SW NOTE
09/09/23 1400   Discharge disposition   Expected discharge disposition Home-Health   Post Acute Care Provider   ([address])   Discharge transportation Private car       Per chart, pt has DC order for today. [facility] notified via [facility]. Pt is cleared from SW/CM stand point.          PLAN: Home w/ [facility]          MAR Vasquez, [address] [facility]

## 2023-09-09 NOTE — PLAN OF CARE
Problem: Patient Centered Care  Goal: Patient preferences are identified and integrated in the patient's plan of care  Description: Interventions:  - What would you like us to know as we care for you? Keep the family informed of the plan of care. - Provide timely, complete, and accurate information to patient/family  - Incorporate patient and family knowledge, values, beliefs, and cultural backgrounds into the planning and delivery of care  - Encourage patient/family to participate in care and decision-making at the level they choose  - Honor patient and family perspectives and choices  Outcome: Progressing     Problem: Diabetes/Glucose Control  Goal: Glucose maintained within prescribed range  Description: INTERVENTIONS:  - Monitor Blood Glucose as ordered  - Assess for signs and symptoms of hyperglycemia and hypoglycemia  - Administer ordered medications to maintain glucose within target range  - Assess barriers to adequate nutritional intake and initiate nutrition consult as needed  - Instruct patient on self management of diabetes  Outcome: Progressing     Problem: Patient/Family Goals  Goal: Patient/Family Long Term Goal  Description: Patient's Long Term Goal: Discharge home    Interventions:  - Medications as ordered  - Tests and procedures as ordered  - PT/OT as ordered  - See additional Care Plan goals for specific interventions  Outcome: Progressing  Goal: Patient/Family Short Term Goal  Description: Patient's Short Term Goal: Shower, ambulate, stairs.     Interventions:   - Medications as ordered  - Tests and procedures as ordered  - PT/OT as ordered    - See additional Care Plan goals for specific interventions  Outcome: Progressing     Problem: PAIN - ADULT  Goal: Verbalizes/displays adequate comfort level or patient's stated pain goal  Description: INTERVENTIONS:  - Encourage pt to monitor pain and request assistance  - Assess pain using appropriate pain scale  - Administer analgesics based on type and severity of pain and evaluate response  - Implement non-pharmacological measures as appropriate and evaluate response  - Consider cultural and social influences on pain and pain management  - Manage/alleviate anxiety  - Utilize distraction and/or relaxation techniques  - Monitor for opioid side effects  - Notify MD/LIP if interventions unsuccessful or patient reports new pain  - Anticipate increased pain with activity and pre-medicate as appropriate  Outcome: Progressing     Problem: SKIN/TISSUE INTEGRITY - ADULT  Goal: Skin integrity remains intact  Description: INTERVENTIONS  - Assess and document risk factors for pressure ulcer development  - Assess and document skin integrity  - Monitor for areas of redness and/or skin breakdown  - Initiate interventions, skin care algorithm/standards of care as needed  Outcome: Progressing  Goal: Incision(s), wounds(s) or drain site(s) healing without S/S of infection  Description: INTERVENTIONS:  - Assess and document risk factors for pressure ulcer development  - Assess and document skin integrity  - Assess and document dressing/incision, wound bed, drain sites and surrounding tissue  - Implement wound care per orders  - Initiate isolation precautions as appropriate  - Initiate Pressure Ulcer prevention bundle as indicated  Outcome: Progressing     Problem: CARDIOVASCULAR - ADULT  Goal: Maintains optimal cardiac output and hemodynamic stability  Description: INTERVENTIONS:  - Monitor vital signs, rhythm, and trends  - Monitor for bleeding, hypotension and signs of decreased cardiac output  - Evaluate effectiveness of vasoactive medications to optimize hemodynamic stability  - Monitor arterial and/or venous puncture sites for bleeding and/or hematoma  - Assess quality of pulses, skin color and temperature  - Assess for signs of decreased coronary artery perfusion - ex.  Angina  - Evaluate fluid balance, assess for edema, trend weights  Outcome: Progressing  Goal: Absence of cardiac arrhythmias or at baseline  Description: INTERVENTIONS:  - Continuous cardiac monitoring, monitor vital signs, obtain 12 lead EKG if indicated  - Evaluate effectiveness of antiarrhythmic and heart rate control medications as ordered  - Initiate emergency measures for life threatening arrhythmias  - Monitor electrolytes and administer replacement therapy as ordered  Outcome: Progressing

## 2023-09-09 NOTE — PLAN OF CARE
Up in chair for meals, walk X2. Showered and given incision instructions. Verified with Dr. Ankur Ruano, [address] APN, and CV Surg okay to discharge. Patient, wife and daughter given discharge instructions. Wife given shower instructions as well. Medications picked up by wife and med instructions given to all. All questions answered. Patient sent home with all belonging, betadine swabs and heart binder. Patient taken down by wheelchair by this RN and wife picked him up and placed in backseat without issue.    Problem: Diabetes/Glucose Control  Goal: Glucose maintained within prescribed range  Description: INTERVENTIONS:  - Monitor Blood Glucose as ordered  - Assess for signs and symptoms of hyperglycemia and hypoglycemia  - Administer ordered medications to maintain glucose within target range  - Assess barriers to adequate nutritional intake and initiate nutrition consult as needed  - Instruct patient on self management of diabetes  Outcome: Progressing     Problem: PAIN - ADULT  Goal: Verbalizes/displays adequate comfort level or patient's stated pain goal  Description: INTERVENTIONS:  - Encourage pt to monitor pain and request assistance  - Assess pain using appropriate pain scale  - Administer analgesics based on type and severity of pain and evaluate response  - Implement non-pharmacological measures as appropriate and evaluate response  - Consider cultural and social influences on pain and pain management  - Manage/alleviate anxiety  - Utilize distraction and/or relaxation techniques  - Monitor for opioid side effects  - Notify MD/LIP if interventions unsuccessful or patient reports new pain  - Anticipate increased pain with activity and pre-medicate as appropriate  Outcome: Progressing     Problem: SKIN/TISSUE INTEGRITY - ADULT  Goal: Skin integrity remains intact  Description: INTERVENTIONS  - Assess and document risk factors for pressure ulcer development  - Assess and document skin integrity  - Monitor for areas of redness and/or skin breakdown  - Initiate interventions, skin care algorithm/standards of care as needed  Outcome: Progressing  Goal: Incision(s), wounds(s) or drain site(s) healing without S/S of infection  Description: INTERVENTIONS:  - Assess and document risk factors for pressure ulcer development  - Assess and document skin integrity  - Assess and document dressing/incision, wound bed, drain sites and surrounding tissue  - Implement wound care per orders  - Initiate isolation precautions as appropriate  - Initiate Pressure Ulcer prevention bundle as indicated  Outcome: Progressing     Problem: CARDIOVASCULAR - ADULT  Goal: Maintains optimal cardiac output and hemodynamic stability  Description: INTERVENTIONS:  - Monitor vital signs, rhythm, and trends  - Monitor for bleeding, hypotension and signs of decreased cardiac output  - Evaluate effectiveness of vasoactive medications to optimize hemodynamic stability  - Monitor arterial and/or venous puncture sites for bleeding and/or hematoma  - Assess quality of pulses, skin color and temperature  - Assess for signs of decreased coronary artery perfusion - ex.  Angina  - Evaluate fluid balance, assess for edema, trend weights  Outcome: Progressing  Goal: Absence of cardiac arrhythmias or at baseline  Description: INTERVENTIONS:  - Continuous cardiac monitoring, monitor vital signs, obtain 12 lead EKG if indicated  - Evaluate effectiveness of antiarrhythmic and heart rate control medications as ordered  - Initiate emergency measures for life threatening arrhythmias  - Monitor electrolytes and administer replacement therapy as ordered  Outcome: Progressing

## 2023-09-10 NOTE — ED INITIAL ASSESSMENT (HPI)
Patient arrives ambulatory through triage with c/o of swelling to post op site. Patient states that he recently had heart surgery and was released today.

## 2023-09-11 ENCOUNTER — PATIENT OUTREACH (OUTPATIENT)
Dept: CASE MANAGEMENT | Age: 58
End: 2023-09-11

## 2023-09-11 ENCOUNTER — TELEPHONE (OUTPATIENT)
Dept: INTERNAL MEDICINE CLINIC | Facility: CLINIC | Age: 58
End: 2023-09-11

## 2023-09-14 ENCOUNTER — TELEPHONE (OUTPATIENT)
Dept: MEDSURG UNIT | Facility: HOSPITAL | Age: 58
End: 2023-09-14

## 2023-09-14 DIAGNOSIS — Z86.010 PERSONAL HISTORY OF COLONIC POLYPS: Primary | ICD-10-CM

## 2023-09-22 NOTE — TELEPHONE ENCOUNTER
Scheduled for:  Colonoscopy [id number]  Provider Name:  Dr Beverley Holt  Date:  1/16/2024  Location:  [facility]  Sedation:  MAC  Time:  8:15am (pt is aware to arrive at 7:15 am)  Prep:  Split dose Trilyte. Meds/Allergies Reconciled?:  Yes  Diagnosis with codes:  H/O Colon Polyps Z86.010  Was patient informed to call insurance with codes (Y/N):  Yes   Referral sent?:  Referral was sent at the time of electronic surgical scheduling. [facility] or [facility]  notified?:  I sent an electronic request to Endo Scheduling and received a confirmation today. Medication Orders:  Pt is aware to NOT take iron pills, herbal meds and diet supplements for 7 days before exam. Also to NOT take any form of alcohol, recreational drugs and any forms of ED meds 24 hours before exam.   Misc Orders:  N/A   Further instructions given by staff:  I discussed the prep instructions with the patient which he verbally understood. I will send prep instructions via My Chart.

## 2023-09-22 NOTE — TELEPHONE ENCOUNTER
GI RNs: Please contact the patient. The patient recently had coronary bypass grafting on September 6, 2023. He is tentatively scheduled for a surveillance colonoscopy on September 27, 2023. If the patient is not experiencing any GI symptoms (change in bowel habits, bleeding) I would opt to postpone the colonoscopy for an additional several weeks to allow healing post bypass surgery. We would also need to query the prescribing physician on whether he would be able to hold Plavix for 5 days preceding the procedure.

## 2023-09-22 NOTE — TELEPHONE ENCOUNTER
Dr Irish Goodpasture and spoke to the patient, /name verified. He stated he discussed colonoscopy schedule on 2023 with Dr Augustine Steiner before his bypass. Dr Augustine Steiner recommended for him to reschedule the procedure in 2-3 months. He denies rectal bleeding or change in bowel habit. He is in agreement to cancel the procedure on 2023 and to reschedule in 2-3 months. I will obtain cardiac and plavix clearance after rescheduled.     Please also see TE from 2023

## 2023-09-26 ENCOUNTER — OFFICE VISIT (OUTPATIENT)
Dept: INTERNAL MEDICINE CLINIC | Facility: CLINIC | Age: 58
End: 2023-09-26

## 2023-09-26 VITALS
OXYGEN SATURATION: 97 % | SYSTOLIC BLOOD PRESSURE: 116 MMHG | RESPIRATION RATE: 14 BRPM | WEIGHT: 191 LBS | HEART RATE: 67 BPM | HEIGHT: 70 IN | DIASTOLIC BLOOD PRESSURE: 74 MMHG | BODY MASS INDEX: 27.35 KG/M2

## 2023-09-26 DIAGNOSIS — I21.4 NSTEMI (NON-ST ELEVATED MYOCARDIAL INFARCTION) (HCC): ICD-10-CM

## 2023-09-26 DIAGNOSIS — E04.1 THYROID NODULE: ICD-10-CM

## 2023-09-26 DIAGNOSIS — Z09 HOSPITAL DISCHARGE FOLLOW-UP: Primary | ICD-10-CM

## 2023-09-26 DIAGNOSIS — I25.118 CORONARY ARTERY DISEASE OF NATIVE ARTERY OF NATIVE HEART WITH STABLE ANGINA PECTORIS (HCC): Chronic | ICD-10-CM

## 2023-09-26 DIAGNOSIS — Z95.1 HISTORY OF CORONARY ARTERY BYPASS GRAFT: ICD-10-CM

## 2023-09-26 DIAGNOSIS — Z28.21 IMMUNIZATION NOT CARRIED OUT BECAUSE OF PATIENT REFUSAL: ICD-10-CM

## 2023-09-26 PROBLEM — I20.89 STABLE ANGINA: Status: RESOLVED | Noted: 2023-09-01 | Resolved: 2023-09-26

## 2023-09-26 PROBLEM — K61.1 PERIRECTAL ABSCESS: Status: RESOLVED | Noted: 2022-07-31 | Resolved: 2023-09-26

## 2023-09-26 PROBLEM — I20.89 STABLE ANGINA (HCC): Status: RESOLVED | Noted: 2023-09-01 | Resolved: 2023-09-26

## 2023-09-26 PROBLEM — Z01.818 PREOP TESTING: Status: RESOLVED | Noted: 2023-09-06 | Resolved: 2023-09-26

## 2023-09-26 PROCEDURE — 3074F SYST BP LT 130 MM HG: CPT | Performed by: INTERNAL MEDICINE

## 2023-09-26 PROCEDURE — 99214 OFFICE O/P EST MOD 30 MIN: CPT | Performed by: INTERNAL MEDICINE

## 2023-09-26 PROCEDURE — 3008F BODY MASS INDEX DOCD: CPT | Performed by: INTERNAL MEDICINE

## 2023-09-26 PROCEDURE — 3078F DIAST BP <80 MM HG: CPT | Performed by: INTERNAL MEDICINE

## 2023-10-06 ENCOUNTER — MED REC SCAN ONLY (OUTPATIENT)
Dept: INTERNAL MEDICINE CLINIC | Facility: CLINIC | Age: 58
End: 2023-10-06

## 2023-10-09 RX ORDER — FAMOTIDINE 20 MG/1
20 TABLET, FILM COATED ORAL 2 TIMES DAILY
Qty: 60 TABLET | Refills: 0 | Status: SHIPPED | OUTPATIENT
Start: 2023-10-09

## 2023-10-09 NOTE — TELEPHONE ENCOUNTER
Dr. Jackson Chester, please advise on pended refill- previously ordered by someone else.        multivitamin (Tab-A-Yamilka/Beta Carotene) tab 1 tablet [[id number]] (Order [id number] '  09/09/23 2017 [facility] Discharge Provider, Automatic Reason:Patient discharged

## 2023-10-09 NOTE — TELEPHONE ENCOUNTER
Per patient he needs rx med famotidine and Vit A Beta Carotene Tab. And per patient he is totally out of med. Current Outpatient Medications   Medication Sig Dispense Refill                                famotidine 20 MG Oral Tab Take 1 tablet (20 mg total) by mouth 2 (two) times daily.  60 tablet 0

## 2023-10-16 ENCOUNTER — HOSPITAL ENCOUNTER (OUTPATIENT)
Dept: ULTRASOUND IMAGING | Age: 58
Discharge: HOME | End: 2023-10-16
Attending: INTERNAL MEDICINE
Payer: MEDICAID

## 2023-10-16 DIAGNOSIS — E04.1 THYROID NODULE: ICD-10-CM

## 2023-10-16 PROCEDURE — 76536 US EXAM OF HEAD AND NECK: CPT | Performed by: INTERNAL MEDICINE

## 2023-10-17 DIAGNOSIS — E04.1 THYROID NODULE GREATER THAN OR EQUAL TO 1.5 CM IN DIAMETER INCIDENTALLY NOTED ON IMAGING STUDY: Primary | ICD-10-CM

## 2023-10-18 ENCOUNTER — HOSPITAL ENCOUNTER (EMERGENCY)
Facility: HOSPITAL | Age: 58
Discharge: HOME | End: 2023-10-18
Attending: EMERGENCY MEDICINE
Payer: MEDICAID

## 2023-10-18 VITALS
HEIGHT: 70 IN | RESPIRATION RATE: 16 BRPM | OXYGEN SATURATION: 97 % | BODY MASS INDEX: 27.2 KG/M2 | TEMPERATURE: 99 F | HEART RATE: 63 BPM | DIASTOLIC BLOOD PRESSURE: 93 MMHG | SYSTOLIC BLOOD PRESSURE: 137 MMHG | WEIGHT: 190 LBS

## 2023-10-18 DIAGNOSIS — R10.13 DYSPEPSIA: Primary | ICD-10-CM

## 2023-10-18 DIAGNOSIS — R11.2 NAUSEA AND VOMITING IN ADULT: ICD-10-CM

## 2023-10-18 LAB
ALBUMIN SERPL-MCNC: 3.9 G/DL (ref 3.4–5)
ALBUMIN/GLOB SERPL: 1.1 {RATIO} (ref 1–2)
ALP LIVER SERPL-CCNC: 82 U/L
ALT SERPL-CCNC: 28 U/L
ANION GAP SERPL CALC-SCNC: 6 MMOL/L (ref 0–18)
AST SERPL-CCNC: 19 U/L (ref 15–37)
BASOPHILS # BLD AUTO: 0.04 X10(3) UL (ref 0–0.2)
BASOPHILS NFR BLD AUTO: 0.4 %
BILIRUB SERPL-MCNC: 0.4 MG/DL (ref 0.1–2)
BUN BLD-MCNC: 13 MG/DL (ref 7–18)
BUN/CREAT SERPL: 13.7 (ref 10–20)
CALCIUM BLD-MCNC: 8.9 MG/DL (ref 8.5–10.1)
CHLORIDE SERPL-SCNC: 108 MMOL/L (ref 98–112)
CO2 SERPL-SCNC: 27 MMOL/L (ref 21–32)
CREAT BLD-MCNC: 0.95 MG/DL
DEPRECATED RDW RBC AUTO: 39.8 FL (ref 35.1–46.3)
EGFRCR SERPLBLD CKD-EPI 2021: 92 ML/MIN/1.73M2 (ref 60–?)
EOSINOPHIL # BLD AUTO: 0.66 X10(3) UL (ref 0–0.7)
EOSINOPHIL NFR BLD AUTO: 7.2 %
ERYTHROCYTE [DISTWIDTH] IN BLOOD BY AUTOMATED COUNT: 12.6 % (ref 11–15)
GLOBULIN PLAS-MCNC: 3.5 G/DL (ref 2.8–4.4)
GLUCOSE BLD-MCNC: 99 MG/DL (ref 70–99)
HCT VFR BLD AUTO: 39.9 %
HGB BLD-MCNC: 13.5 G/DL
IMM GRANULOCYTES # BLD AUTO: 0.03 X10(3) UL (ref 0–1)
IMM GRANULOCYTES NFR BLD: 0.3 %
LIPASE SERPL-CCNC: 36 U/L (ref 13–75)
LYMPHOCYTES # BLD AUTO: 2.47 X10(3) UL (ref 1–4)
LYMPHOCYTES NFR BLD AUTO: 27.1 %
MCH RBC QN AUTO: 29.5 PG (ref 26–34)
MCHC RBC AUTO-ENTMCNC: 33.8 G/DL (ref 31–37)
MCV RBC AUTO: 87.1 FL
MONOCYTES # BLD AUTO: 0.83 X10(3) UL (ref 0.1–1)
MONOCYTES NFR BLD AUTO: 9.1 %
NEUTROPHILS # BLD AUTO: 5.09 X10 (3) UL (ref 1.5–7.7)
NEUTROPHILS # BLD AUTO: 5.09 X10(3) UL (ref 1.5–7.7)
NEUTROPHILS NFR BLD AUTO: 55.9 %
OSMOLALITY SERPL CALC.SUM OF ELEC: 292 MOSM/KG (ref 275–295)
PLATELET # BLD AUTO: 255 10(3)UL (ref 150–450)
POTASSIUM SERPL-SCNC: 3.7 MMOL/L (ref 3.5–5.1)
PROT SERPL-MCNC: 7.4 G/DL (ref 6.4–8.2)
RBC # BLD AUTO: 4.58 X10(6)UL
SODIUM SERPL-SCNC: 141 MMOL/L (ref 136–145)
WBC # BLD AUTO: 9.1 X10(3) UL (ref 4–11)

## 2023-10-18 PROCEDURE — 99284 EMERGENCY DEPT VISIT MOD MDM: CPT

## 2023-10-18 PROCEDURE — 93005 ELECTROCARDIOGRAM TRACING: CPT

## 2023-10-18 PROCEDURE — 96374 THER/PROPH/DIAG INJ IV PUSH: CPT

## 2023-10-18 PROCEDURE — 85025 COMPLETE CBC W/AUTO DIFF WBC: CPT | Performed by: EMERGENCY MEDICINE

## 2023-10-18 PROCEDURE — 80053 COMPREHEN METABOLIC PANEL: CPT | Performed by: EMERGENCY MEDICINE

## 2023-10-18 PROCEDURE — 83690 ASSAY OF LIPASE: CPT | Performed by: EMERGENCY MEDICINE

## 2023-10-18 PROCEDURE — 93010 ELECTROCARDIOGRAM REPORT: CPT

## 2023-10-18 RX ORDER — ONDANSETRON 2 MG/ML
4 INJECTION INTRAMUSCULAR; INTRAVENOUS ONCE
Status: DISCONTINUED | OUTPATIENT
Start: 2023-10-18 | End: 2023-10-18

## 2023-10-18 RX ORDER — METOCLOPRAMIDE 10 MG/1
10 TABLET ORAL 3 TIMES DAILY PRN
Qty: 10 TABLET | Refills: 0 | Status: SHIPPED | OUTPATIENT
Start: 2023-10-18 | End: 2023-10-28

## 2023-10-18 RX ORDER — METOCLOPRAMIDE HYDROCHLORIDE 5 MG/ML
5 INJECTION INTRAMUSCULAR; INTRAVENOUS ONCE
Status: COMPLETED | OUTPATIENT
Start: 2023-10-18 | End: 2023-10-18

## 2023-10-18 RX ORDER — ONDANSETRON 4 MG/1
4 TABLET, ORALLY DISINTEGRATING ORAL EVERY 8 HOURS PRN
Qty: 6 TABLET | Refills: 0 | Status: SHIPPED | OUTPATIENT
Start: 2023-10-18 | End: 2023-10-18

## 2023-10-19 LAB
ATRIAL RATE: 66 BPM
P AXIS: 53 DEGREES
P-R INTERVAL: 174 MS
Q-T INTERVAL: 482 MS
QRS DURATION: 100 MS
QTC CALCULATION (BEZET): 505 MS
R AXIS: 43 DEGREES
T AXIS: 96 DEGREES
VENTRICULAR RATE: 66 BPM

## 2023-10-20 ENCOUNTER — TELEPHONE (OUTPATIENT)
Dept: INTERNAL MEDICINE CLINIC | Facility: CLINIC | Age: 58
End: 2023-10-20

## 2023-10-20 NOTE — TELEPHONE ENCOUNTER
Eran Garcia RN  10/17/2023  4:32 PM CDT Back to Top      Patient called to schedule the biopsy but they need to know when patient is to stop/resume the aspirin 81mg and the plavix 75mg before they can schedule the patient? Please advise. Zandra Stevenson RN  10/17/2023  4:03 PM CDT       Patient calling ( identified name and  ) in regards to [employer] message     Provided information for  Biopsy : To schedule an appointment for your radiology test please call [facility] Scheduling at [phone]. Patient verbalizes understanding and agrees with plan. Lilia Helm,     Your ultrasound is attached. It does show a 4 cm nodule in the left portion of your thyroid gland. It is recommended that you have a biopsy of this. I am going to place the order for this. Schedule as soon as you can.      Joana Syed MD  [phone]   Written by Arnulfo Ng MD on 10/17/2023 10:03 AM CDT  Seen by patient Zackery Renee on 10/17/2023  3:55 PM

## 2023-10-20 NOTE — TELEPHONE ENCOUNTER
I need him to discuss with cardiology if it is okay to be off those medications so early after starting. If it is not then we may need to postpone the biopsy.

## 2023-11-13 ENCOUNTER — ORDER TRANSCRIPTION (OUTPATIENT)
Dept: CARDIAC REHAB | Facility: HOSPITAL | Age: 58
End: 2023-11-13

## 2023-11-13 DIAGNOSIS — Z95.1 S/P CABG (CORONARY ARTERY BYPASS GRAFT): Primary | ICD-10-CM

## 2023-11-14 ENCOUNTER — TELEPHONE (OUTPATIENT)
Dept: SURGERY | Facility: CLINIC | Age: 58
End: 2023-11-14

## 2023-11-15 ENCOUNTER — CARDPULM VISIT (OUTPATIENT)
Dept: CARDIAC REHAB | Facility: HOSPITAL | Age: 58
End: 2023-11-15
Attending: INTERNAL MEDICINE
Payer: MEDICAID

## 2023-11-15 DIAGNOSIS — Z95.1 S/P CABG (CORONARY ARTERY BYPASS GRAFT): Primary | ICD-10-CM

## 2023-11-20 ENCOUNTER — APPOINTMENT (OUTPATIENT)
Dept: CARDIAC REHAB | Facility: HOSPITAL | Age: 58
End: 2023-11-20
Attending: INTERNAL MEDICINE
Payer: MEDICAID

## 2023-11-20 ENCOUNTER — HOSPITAL ENCOUNTER (OUTPATIENT)
Dept: CT IMAGING | Facility: HOSPITAL | Age: 58
Discharge: HOME | End: 2023-11-20
Attending: SURGERY
Payer: MEDICAID

## 2023-11-20 ENCOUNTER — OFFICE VISIT (OUTPATIENT)
Dept: SURGERY | Facility: CLINIC | Age: 58
End: 2023-11-20

## 2023-11-20 VITALS — HEIGHT: 70 IN | WEIGHT: 190 LBS | BODY MASS INDEX: 27.2 KG/M2

## 2023-11-20 DIAGNOSIS — N50.89 PERINEAL MASS, MALE: Primary | ICD-10-CM

## 2023-11-20 DIAGNOSIS — N50.89 PERINEAL MASS, MALE: ICD-10-CM

## 2023-11-20 LAB
CREAT BLD-MCNC: 0.8 MG/DL
EGFRCR SERPLBLD CKD-EPI 2021: 102 ML/MIN/1.73M2 (ref 60–?)

## 2023-11-20 PROCEDURE — 99213 OFFICE O/P EST LOW 20 MIN: CPT | Performed by: SURGERY

## 2023-11-20 PROCEDURE — 3008F BODY MASS INDEX DOCD: CPT | Performed by: SURGERY

## 2023-11-20 PROCEDURE — 82565 ASSAY OF CREATININE: CPT

## 2023-11-20 PROCEDURE — 72193 CT PELVIS W/DYE: CPT | Performed by: SURGERY

## 2023-11-21 ENCOUNTER — TELEPHONE (OUTPATIENT)
Dept: SURGERY | Facility: CLINIC | Age: 58
End: 2023-11-21

## 2023-11-22 ENCOUNTER — TELEPHONE (OUTPATIENT)
Dept: SURGERY | Facility: CLINIC | Age: 58
End: 2023-11-22

## 2023-11-22 DIAGNOSIS — K61.0 ANAL ABSCESS: Primary | ICD-10-CM

## 2023-11-22 NOTE — TELEPHONE ENCOUNTER
Abnormal CT. 11-20-23. Per MD YOU add I & D to 11-28-23. Urgent surgery added to 11-28-23. Patient was informed. Patient requires authorization. Clinicals faxed to [facility] for STAT CT from yesterday and URGENT surgery for 11-28-23. Serge Hernandez is pending  Ref # [id number]    I talked to patient about surgery and clarified all details. Patient was advised to go to ED if pain is severe or he gets fever. ,  Patient expressed understand and surgery orders placed. Instructions provided to the patient.      Huma Shaffer

## 2023-11-22 NOTE — TELEPHONE ENCOUNTER
MD YOU received a requests from [employer] that they need addl information to authorize the CT.  Per patient they told him they need a peer to peer review.  Patient already had the CT yesterday.      CT abnormal.    GENIA

## 2023-11-22 NOTE — TELEPHONE ENCOUNTER
Pt states he spoke to ins co and peer to peer is needed to get this CT approved - it was already done on 11/20

## 2023-11-27 ENCOUNTER — APPOINTMENT (OUTPATIENT)
Dept: CARDIAC REHAB | Facility: HOSPITAL | Age: 58
End: 2023-11-27
Attending: INTERNAL MEDICINE
Payer: MEDICAID

## 2023-11-28 ENCOUNTER — HOSPITAL ENCOUNTER (OUTPATIENT)
Facility: HOSPITAL | Age: 58
Setting detail: HOSPITAL OUTPATIENT SURGERY
Discharge: HOME | End: 2023-11-28
Attending: SURGERY | Admitting: SURGERY
Payer: MEDICAID

## 2023-11-28 ENCOUNTER — ANESTHESIA (OUTPATIENT)
Dept: SURGERY | Facility: HOSPITAL | Age: 58
End: 2023-11-28
Payer: MEDICAID

## 2023-11-28 ENCOUNTER — ANESTHESIA EVENT (OUTPATIENT)
Dept: SURGERY | Facility: HOSPITAL | Age: 58
End: 2023-11-28
Payer: MEDICAID

## 2023-11-28 VITALS
OXYGEN SATURATION: 95 % | TEMPERATURE: 98 F | SYSTOLIC BLOOD PRESSURE: 151 MMHG | WEIGHT: 193 LBS | RESPIRATION RATE: 16 BRPM | HEART RATE: 69 BPM | BODY MASS INDEX: 27.63 KG/M2 | DIASTOLIC BLOOD PRESSURE: 74 MMHG | HEIGHT: 70 IN

## 2023-11-28 DIAGNOSIS — K61.0 ANAL ABSCESS: ICD-10-CM

## 2023-11-28 PROCEDURE — 46050 I&D PERIANAL ABSCESS SUPFC: CPT | Performed by: SURGERY

## 2023-11-28 PROCEDURE — 0D9QXZZ DRAINAGE OF ANUS, EXTERNAL APPROACH: ICD-10-PCS | Performed by: SURGERY

## 2023-11-28 RX ORDER — ONDANSETRON 2 MG/ML
4 INJECTION INTRAMUSCULAR; INTRAVENOUS EVERY 6 HOURS PRN
Status: DISCONTINUED | OUTPATIENT
Start: 2023-11-28 | End: 2023-11-28

## 2023-11-28 RX ORDER — LEVOFLOXACIN 500 MG/1
500 TABLET, FILM COATED ORAL DAILY
Qty: 7 TABLET | Refills: 0 | Status: SHIPPED | OUTPATIENT
Start: 2023-11-28

## 2023-11-28 RX ORDER — HYDROCODONE BITARTRATE AND ACETAMINOPHEN 5; 325 MG/1; MG/1
1 TABLET ORAL ONCE AS NEEDED
Status: COMPLETED | OUTPATIENT
Start: 2023-11-28 | End: 2023-11-28

## 2023-11-28 RX ORDER — DEXAMETHASONE SODIUM PHOSPHATE 4 MG/ML
VIAL (ML) INJECTION AS NEEDED
Status: DISCONTINUED | OUTPATIENT
Start: 2023-11-28 | End: 2023-11-28 | Stop reason: SURG

## 2023-11-28 RX ORDER — LIDOCAINE HYDROCHLORIDE 10 MG/ML
INJECTION, SOLUTION EPIDURAL; INFILTRATION; INTRACAUDAL; PERINEURAL AS NEEDED
Status: DISCONTINUED | OUTPATIENT
Start: 2023-11-28 | End: 2023-11-28 | Stop reason: SURG

## 2023-11-28 RX ORDER — ONDANSETRON 2 MG/ML
INJECTION INTRAMUSCULAR; INTRAVENOUS AS NEEDED
Status: DISCONTINUED | OUTPATIENT
Start: 2023-11-28 | End: 2023-11-28 | Stop reason: SURG

## 2023-11-28 RX ORDER — HYDROMORPHONE HYDROCHLORIDE 1 MG/ML
0.2 INJECTION, SOLUTION INTRAMUSCULAR; INTRAVENOUS; SUBCUTANEOUS EVERY 5 MIN PRN
Status: DISCONTINUED | OUTPATIENT
Start: 2023-11-28 | End: 2023-11-28

## 2023-11-28 RX ORDER — CEFAZOLIN SODIUM/WATER 2 G/20 ML
2 SYRINGE (ML) INTRAVENOUS ONCE
Status: COMPLETED | OUTPATIENT
Start: 2023-11-28 | End: 2023-11-28

## 2023-11-28 RX ORDER — MORPHINE SULFATE 4 MG/ML
4 INJECTION, SOLUTION INTRAMUSCULAR; INTRAVENOUS EVERY 10 MIN PRN
Status: DISCONTINUED | OUTPATIENT
Start: 2023-11-28 | End: 2023-11-28

## 2023-11-28 RX ORDER — PROCHLORPERAZINE EDISYLATE 5 MG/ML
5 INJECTION INTRAMUSCULAR; INTRAVENOUS EVERY 8 HOURS PRN
Status: DISCONTINUED | OUTPATIENT
Start: 2023-11-28 | End: 2023-11-28

## 2023-11-28 RX ORDER — ACETAMINOPHEN 500 MG
1000 TABLET ORAL ONCE
Status: COMPLETED | OUTPATIENT
Start: 2023-11-28 | End: 2023-11-28

## 2023-11-28 RX ORDER — DEXTROSE MONOHYDRATE 25 G/50ML
50 INJECTION, SOLUTION INTRAVENOUS
Status: DISCONTINUED | OUTPATIENT
Start: 2023-11-28 | End: 2023-11-28

## 2023-11-28 RX ORDER — METRONIDAZOLE 500 MG/1
500 TABLET ORAL EVERY 12 HOURS
Qty: 14 TABLET | Refills: 0 | Status: SHIPPED | OUTPATIENT
Start: 2023-11-28

## 2023-11-28 RX ORDER — SODIUM CHLORIDE, SODIUM LACTATE, POTASSIUM CHLORIDE, CALCIUM CHLORIDE 600; 310; 30; 20 MG/100ML; MG/100ML; MG/100ML; MG/100ML
INJECTION, SOLUTION INTRAVENOUS CONTINUOUS
Status: DISCONTINUED | OUTPATIENT
Start: 2023-11-28 | End: 2023-11-28

## 2023-11-28 RX ORDER — NICOTINE POLACRILEX 4 MG
30 LOZENGE BUCCAL
Status: DISCONTINUED | OUTPATIENT
Start: 2023-11-28 | End: 2023-11-28

## 2023-11-28 RX ORDER — NALOXONE HYDROCHLORIDE 0.4 MG/ML
0.08 INJECTION, SOLUTION INTRAMUSCULAR; INTRAVENOUS; SUBCUTANEOUS AS NEEDED
Status: DISCONTINUED | OUTPATIENT
Start: 2023-11-28 | End: 2023-11-28

## 2023-11-28 RX ORDER — HYDROMORPHONE HYDROCHLORIDE 1 MG/ML
0.6 INJECTION, SOLUTION INTRAMUSCULAR; INTRAVENOUS; SUBCUTANEOUS EVERY 5 MIN PRN
Status: DISCONTINUED | OUTPATIENT
Start: 2023-11-28 | End: 2023-11-28

## 2023-11-28 RX ORDER — METRONIDAZOLE 500 MG/100ML
500 INJECTION, SOLUTION INTRAVENOUS ONCE
Status: DISCONTINUED | OUTPATIENT
Start: 2023-11-28 | End: 2023-11-28 | Stop reason: HOSPADM

## 2023-11-28 RX ORDER — MORPHINE SULFATE 4 MG/ML
2 INJECTION, SOLUTION INTRAMUSCULAR; INTRAVENOUS EVERY 10 MIN PRN
Status: DISCONTINUED | OUTPATIENT
Start: 2023-11-28 | End: 2023-11-28

## 2023-11-28 RX ORDER — HYDROMORPHONE HYDROCHLORIDE 1 MG/ML
0.4 INJECTION, SOLUTION INTRAMUSCULAR; INTRAVENOUS; SUBCUTANEOUS EVERY 5 MIN PRN
Status: DISCONTINUED | OUTPATIENT
Start: 2023-11-28 | End: 2023-11-28

## 2023-11-28 RX ORDER — MORPHINE SULFATE 10 MG/ML
6 INJECTION, SOLUTION INTRAMUSCULAR; INTRAVENOUS EVERY 10 MIN PRN
Status: DISCONTINUED | OUTPATIENT
Start: 2023-11-28 | End: 2023-11-28

## 2023-11-28 RX ORDER — NICOTINE POLACRILEX 4 MG
15 LOZENGE BUCCAL
Status: DISCONTINUED | OUTPATIENT
Start: 2023-11-28 | End: 2023-11-28

## 2023-11-28 RX ORDER — HYDROCODONE BITARTRATE AND ACETAMINOPHEN 5; 325 MG/1; MG/1
1 TABLET ORAL EVERY 6 HOURS PRN
Qty: 20 TABLET | Refills: 0 | Status: SHIPPED | OUTPATIENT
Start: 2023-11-28

## 2023-11-28 RX ORDER — BUPIVACAINE HYDROCHLORIDE AND EPINEPHRINE 2.5; 5 MG/ML; UG/ML
INJECTION, SOLUTION INFILTRATION; PERINEURAL AS NEEDED
Status: DISCONTINUED | OUTPATIENT
Start: 2023-11-28 | End: 2023-11-28 | Stop reason: HOSPADM

## 2023-11-28 RX ORDER — MIDAZOLAM HYDROCHLORIDE 1 MG/ML
INJECTION INTRAMUSCULAR; INTRAVENOUS AS NEEDED
Status: DISCONTINUED | OUTPATIENT
Start: 2023-11-28 | End: 2023-11-28 | Stop reason: SURG

## 2023-11-28 RX ADMIN — SODIUM CHLORIDE, SODIUM LACTATE, POTASSIUM CHLORIDE, CALCIUM CHLORIDE: 600; 310; 30; 20 INJECTION, SOLUTION INTRAVENOUS at 12:00:00

## 2023-11-28 RX ADMIN — ONDANSETRON 4 MG: 2 INJECTION INTRAMUSCULAR; INTRAVENOUS at 12:19:00

## 2023-11-28 RX ADMIN — DEXAMETHASONE SODIUM PHOSPHATE 8 MG: 4 MG/ML VIAL (ML) INJECTION at 12:19:00

## 2023-11-28 RX ADMIN — SODIUM CHLORIDE, SODIUM LACTATE, POTASSIUM CHLORIDE, CALCIUM CHLORIDE: 600; 310; 30; 20 INJECTION, SOLUTION INTRAVENOUS at 12:39:00

## 2023-11-28 RX ADMIN — LIDOCAINE HYDROCHLORIDE 25 MG: 10 INJECTION, SOLUTION EPIDURAL; INFILTRATION; INTRACAUDAL; PERINEURAL at 12:00:00

## 2023-11-28 RX ADMIN — CEFAZOLIN SODIUM/WATER 2 G: 2 G/20 ML SYRINGE (ML) INTRAVENOUS at 12:00:00

## 2023-11-28 RX ADMIN — MIDAZOLAM HYDROCHLORIDE 2 MG: 1 INJECTION INTRAMUSCULAR; INTRAVENOUS at 12:00:00

## 2023-11-28 NOTE — ANESTHESIA POSTPROCEDURE EVALUATION
Patient: Marquise Ch    Procedure Summary       Date: 11/28/23 Room / Location: [address] [place] [address] MAIN OR 03 / [address] [place] [address] MAIN OR    Anesthesia Start: 7871 Anesthesia Stop: 2683    Procedures:       Exam under anesthesia and anal abscess irrigation and debridement (Anus)      ANAL ABSCESS IRRIGATION & DEBRIDEMENT (Anus) Diagnosis:       Anal abscess      (Anal abscess [K61.0])    Surgeons: Gracy Zarco MD Anesthesiologist: Percy Sam MD    Anesthesia Type: general ASA Status: 3            Anesthesia Type: general    Vitals Value Taken Time   /76 11/28/23 1239   Temp 97.3 11/28/23 1241   Pulse 70 11/28/23 1241   Resp 14 11/28/23 1241   SpO2 93 % 11/28/23 1241   Vitals shown include unfiled device data.     [address] [place] [address] AN Post Evaluation:   Patient Evaluated in PACU  Patient Participation: complete - patient participated  Level of Consciousness: awake  Pain Score: 0  Pain Management: adequate  Airway Patency:patent  Yes    Cardiovascular Status: acceptable  Respiratory Status: acceptable  Postoperative Hydration acceptable      Yessi Casas CRNA  11/28/2023 12:41 PM

## 2023-11-28 NOTE — INTERVAL H&P NOTE
Pre-op Diagnosis: Anal abscess [K61.0]    The above referenced H&P was reviewed by Marito Aguilar MD on 11/28/2023, the patient was examined and no significant changes have occurred in the patient's condition since the H&P was performed. I discussed with the patient and/or legal representative the potential benefits, risks and side effects of this procedure; the likelihood of the patient achieving goals; and potential problems that might occur during recuperation. I discussed reasonable alternatives to the procedure, including risks, benefits and side effects related to the alternatives and risks related to not receiving this procedure. We will proceed with procedure as planned.

## 2023-11-28 NOTE — ANESTHESIA PROCEDURE NOTES
Airway  Date/Time: 11/28/2023 12:05 PM  Urgency: elective    Airway not difficult    General Information and Staff    Patient location during procedure: OR  Resident/CRNA: Amanda Borjas CRNA  Performed: CRNA   Performed by: Amanda Borjas CRNA  Authorized by: Ana Hebert MD      Indications and Patient Condition  Indications for airway management: anesthesia  Spontaneous Ventilation: absent  Sedation level: deep  Preoxygenated: yes  Patient position: sniffing  MILS maintained throughout  Mask difficulty assessment: 1 - vent by mask  No planned trial extubation    Final Airway Details  Final airway type: supraglottic airway      Successful airway: classic  Size 5       Number of attempts at approach: 1

## 2023-11-29 ENCOUNTER — HOSPITAL ENCOUNTER (EMERGENCY)
Facility: HOSPITAL | Age: 58
Discharge: HOME | End: 2023-11-30
Attending: EMERGENCY MEDICINE
Payer: MEDICAID

## 2023-11-29 DIAGNOSIS — Z48.00 ENCOUNTER FOR ABSCESS PACKING REMOVAL: Primary | ICD-10-CM

## 2023-11-29 DIAGNOSIS — Z51.89 ENCOUNTER FOR WOUND CARE: ICD-10-CM

## 2023-11-29 PROCEDURE — 99283 EMERGENCY DEPT VISIT LOW MDM: CPT

## 2023-11-29 NOTE — OPERATIVE REPORT
[facility]    PATIENT'S NAME: Onur Singh   ATTENDING PHYSICIAN: Karla Harkins MD   OPERATING PHYSICIAN: Karla Harkins MD   PATIENT ACCOUNT#:   [de-identified]    LOCATION:  [address]  MEDICAL RECORD #:   [id number]       YOB: 1965  ADMISSION DATE:       11/28/2023      OPERATION DATE:  11/28/2023    OPERATIVE REPORT      PREOPERATIVE DIAGNOSIS:  Perineal abscess. POSTOPERATIVE DIAGNOSIS:  Perineal abscess. PROCEDURE:  Examination under anesthesia, anoscopy, incision and drainage of complex perineal abscess with lavage. ASSISTANT:  SUSANA Diehl. ESTIMATED BLOOD LOSS:  2 mL. COMPLICATIONS:  None. ANESTHESIA:  General.      DISPOSITION:  Tolerated well. INDICATIONS:  The patient is 60. He has had 2 other anorectal abscesses drained in the past, presents now with pain in the perineum along the midline midway between the scrotum and the anus. CT suggests phlegmon. Consent obtained. OPERATIVE TECHNIQUE:  He is taken to surgery, is prepped and draped in usual sterile fashion. He is in lithotomy. A 15 blade scalpel is utilized. Then, 1 cm incision is made. This is carried down through the subcutaneous tissue where a deep abscess is identified, drained and cultured. It is irrigated copiously. Loculations are broken up using a hemostat. It is packed with 1/4-inch iodoform. Marcaine infiltrated for local block. I see no evidence of a fistula to the anal canal.  He tolerated this well.      Dictated By Karla Harkins MD  d: 11/28/2023 12:27:46  t: 11/28/2023 18:28:19  Job [id number]  DX/    cc: Jaspal Armstrong MD

## 2023-11-30 ENCOUNTER — APPOINTMENT (OUTPATIENT)
Dept: CARDIAC REHAB | Facility: HOSPITAL | Age: 58
End: 2023-11-30
Attending: INTERNAL MEDICINE
Payer: MEDICAID

## 2023-11-30 ENCOUNTER — TELEPHONE (OUTPATIENT)
Dept: INTERNAL MEDICINE CLINIC | Facility: CLINIC | Age: 58
End: 2023-11-30

## 2023-11-30 ENCOUNTER — TELEPHONE (OUTPATIENT)
Dept: SURGERY | Facility: CLINIC | Age: 58
End: 2023-11-30

## 2023-11-30 VITALS
SYSTOLIC BLOOD PRESSURE: 136 MMHG | TEMPERATURE: 98 F | RESPIRATION RATE: 18 BRPM | OXYGEN SATURATION: 96 % | HEART RATE: 50 BPM | DIASTOLIC BLOOD PRESSURE: 82 MMHG

## 2023-11-30 NOTE — ED QUICK NOTES
This rn educated pt on how to pack wound and apply dressing. Pt and pt wife verbalized understand and repeated steps to pack wound and apply dressing. Teach back method used.

## 2023-11-30 NOTE — ED INITIAL ASSESSMENT (HPI)
Patient states he had an abscess drained yesterday under general anesthesia. He was trying to change the dressing today but was unable due to the pain.

## 2023-11-30 NOTE — CM/SW NOTE
[facility] WAS ASKED TO PLACE REFERRAL FOR THIS PATIENT FOR WOUND CARE AND DRESSING CHANGES. EDCM PLACED REFERRAL WITH AIDIN  EDCM PLACED ORDER FOR FACE TO FACE  EDCM PLACED ORDER FOR SW CONSULT FOR [place] [address]    INFORMED MANUELA CALDERÓN TO INFORM THE PATIENT THAT THEY WILL RECEIVE A CALL FROM EITHER THE [place] [address] IF/AND WHEN REFERRAL WAS ACCEPTED, AND OR THE [facility] IF REFERRAL WAS NOT ACCEPTED BC OF INSURANCE.      Patti Pryor MSN, SAPNA, RN  Emergency Department   Extension [phone]

## 2023-11-30 NOTE — ED QUICK NOTES
Pt states he is unable to change his dressing at home. This rn spoke to [facility] for possible at home wound care for pt.  [facility] to call this rn back

## 2023-11-30 NOTE — TELEPHONE ENCOUNTER
Pt wants Dr Yanique Rosario to call him. He had surgery w/ Dr Robert Granda and has questions about it. I advised he call Dr Viviana Silva office. He said no one responded to him.

## 2023-11-30 NOTE — TELEPHONE ENCOUNTER
Dr. Tuan Clifton - patient concerned about chamging his dressings. They need to be done every day and his wife cannot do them. Please advise.

## 2023-11-30 NOTE — TELEPHONE ENCOUNTER
04/03/20        Sanjiv Vance  [address] [facility] [address]    To Whom It May Concern:    This is to certify Sanjiv Vance was evaluated on 04/03/20 and should be off work at least 7 days from the date symptoms first appeared and at least 3 days after recovery meaning there is no fever without the use of fever-reducing medications and improvement in respiratory symptoms (e.g., cough, shortness of breath).This can take up to 10- 14 days.    Obviously the Coronavirus is a rapidly evolving situation and recommendations are changing regularly to prevent spread of the disease and further loss of life.    If you have any questions please call our office.         Electronically signed by:   Livia Polo CNP                 [facility]  [address] [id number] [phone]      Spoke with patient states he is having a hard time doing his wound dressing. Patient has an appt with Dr. Yas Clark tomorrow. Patient advised to bring up issue with Dr. Yas Clark to see if they can accomodate him in the office for freq wound changes as needed. Patient verbalized understanding and will keep the office updated.

## 2023-11-30 NOTE — TELEPHONE ENCOUNTER
Patient had surgery 11-28-23. I reached out and patient was advised and URGENT pre auth started.    Jennifer Ledbetter

## 2023-11-30 NOTE — TELEPHONE ENCOUNTER
Pt had surgery on 11-28-23. Pt requesting to speak to the nurse regarding changing the bandage. Call transferred to the nurse.

## 2023-12-01 ENCOUNTER — NURSE ONLY (OUTPATIENT)
Dept: SURGERY | Facility: CLINIC | Age: 58
End: 2023-12-01
Payer: MEDICAID

## 2023-12-01 ENCOUNTER — TELEPHONE (OUTPATIENT)
Dept: SURGERY | Facility: CLINIC | Age: 58
End: 2023-12-01

## 2023-12-01 VITALS — BODY MASS INDEX: 28 KG/M2 | WEIGHT: 192 LBS

## 2023-12-01 PROCEDURE — 99024 POSTOP FOLLOW-UP VISIT: CPT | Performed by: SURGERY

## 2023-12-01 NOTE — CM/SW NOTE
Spoke to patient and mentioned to him that home health has not reserved yet. If and when they do reserve it would not be daily changes. He v/u. He states that his wife just doesn't feel comfortable doing wound care, and he is pain. I mentioned to him to bring this up at his f/u appt today. That his wife is most likely going to have to do the dressing or another family member and do nursing visits at his pmd. He states he was going to bring this things up at his appt today.

## 2023-12-01 NOTE — TELEPHONE ENCOUNTER
Patient was seen by the nurse today from Dr Gianna Steen and was told to make an appt next week to f/u on the wound. No openings   Available please advise

## 2023-12-01 NOTE — CM/SW NOTE
Received a call from Chidi who states that Brianda Rios from [facility] home health care approved the [facility] order in [address]. They are willing to go out to see the patient Sunday December 3rd. I also called Dr. Verlena Snellen office and spoke to [facility] who states that the patient and wife were reeducated on how to perform the dressing change. Both wife and the patient v/u. I did mention the approved of the [facility] and [facility] states that Dr. Bruno Sharma would be willing to sign off the orders. I called the patient to update him, but he did not answer. I left a voicemail. They might schedule a nurse visit next week depending on Dr Bruno Sharma approval.     1400- just spoke to Edin Woodard at [facility] who already spoke to the patient. They will be able to come out Sunday and maybe one more time during the week. They will reinforce wound management and education with the wife and patient.

## 2023-12-01 NOTE — HOME CARE LIAISON
Referral received from SW/CM team via [employer]. Discussed with patient the recommendation for home health services, patient agreeable, and all questions answered. Updated CM Brad Vasquez.

## 2023-12-01 NOTE — TELEPHONE ENCOUNTER
12-1-23  Phone call to patient. Contact made. Follow up with Pramod Ackerman made for 12-4-23 so we can help him with wound care. All questions answered.      Yesy Belcher

## 2023-12-04 ENCOUNTER — APPOINTMENT (OUTPATIENT)
Dept: CARDIAC REHAB | Facility: HOSPITAL | Age: 58
End: 2023-12-04
Attending: INTERNAL MEDICINE
Payer: MEDICAID

## 2023-12-04 ENCOUNTER — OFFICE VISIT (OUTPATIENT)
Dept: SURGERY | Facility: CLINIC | Age: 58
End: 2023-12-04
Payer: MEDICAID

## 2023-12-04 VITALS — BODY MASS INDEX: 28 KG/M2 | WEIGHT: 192 LBS

## 2023-12-04 DIAGNOSIS — Z48.89 ENCOUNTER FOR POSTOPERATIVE CARE: Primary | ICD-10-CM

## 2023-12-04 PROCEDURE — 99024 POSTOP FOLLOW-UP VISIT: CPT

## 2023-12-05 ENCOUNTER — TELEPHONE (OUTPATIENT)
Dept: SURGERY | Facility: CLINIC | Age: 58
End: 2023-12-05

## 2023-12-05 NOTE — TELEPHONE ENCOUNTER
Authorization for surgery and denial for authorization of CT received by fax. Sending copies to scanning.    Hanh Muniz

## 2023-12-06 ENCOUNTER — APPOINTMENT (OUTPATIENT)
Dept: CARDIAC REHAB | Facility: HOSPITAL | Age: 58
End: 2023-12-06
Attending: INTERNAL MEDICINE
Payer: MEDICAID

## 2023-12-07 ENCOUNTER — APPOINTMENT (OUTPATIENT)
Dept: CARDIAC REHAB | Facility: HOSPITAL | Age: 58
End: 2023-12-07
Attending: INTERNAL MEDICINE
Payer: MEDICAID

## 2023-12-08 ENCOUNTER — TELEPHONE (OUTPATIENT)
Dept: SURGERY | Facility: CLINIC | Age: 58
End: 2023-12-08

## 2023-12-08 NOTE — TELEPHONE ENCOUNTER
Per pt he had a post op appt for 12/12  pt is upset appointment is no longer showing on mychart  pt is requesting to be seen on 12/12   No openings available     It seems that pt was seeing by JALEEL Odonnell on 12/4 and state to f/u in 2 weeks that would be 12/18? I tried to explain to pt but pt said he was told to be seen on the 12. Please advise

## 2023-12-08 NOTE — TELEPHONE ENCOUNTER
Pt called mitch states he had appt on the 12/12 with MD explained there was never an appt on 12/12 said I don't know what I am talking about it was written on his sheet explained that there was never an appt made itf changed or scheduled I would be able to see said I don't know what I am talking about states he never had a post op explaine dpost op was on 12/4 with JOSY saha states he didn't that was just wound care explained was a post op states I don't know what I am talking about and he was to see MD like he said on the 12th he continued to yell then phone hung up please advise

## 2023-12-11 ENCOUNTER — APPOINTMENT (OUTPATIENT)
Dept: CARDIAC REHAB | Facility: HOSPITAL | Age: 58
End: 2023-12-11
Attending: INTERNAL MEDICINE
Payer: MEDICAID

## 2023-12-12 ENCOUNTER — OFFICE VISIT (OUTPATIENT)
Dept: OPHTHALMOLOGY | Facility: CLINIC | Age: 58
End: 2023-12-12

## 2023-12-12 DIAGNOSIS — H40.1332 PIGMENTARY GLAUCOMA OF BOTH EYES, MODERATE STAGE: Primary | ICD-10-CM

## 2023-12-12 PROCEDURE — 99213 OFFICE O/P EST LOW 20 MIN: CPT | Performed by: OPHTHALMOLOGY

## 2023-12-12 RX ORDER — LATANOPROST 50 UG/ML
1 SOLUTION/ DROPS OPHTHALMIC NIGHTLY
Qty: 7.5 ML | Refills: 3 | Status: SHIPPED | OUTPATIENT
Start: 2023-12-12

## 2023-12-12 NOTE — TELEPHONE ENCOUNTER
Patient seen here asking for appointment, was told per Randi Calderón patient did not need to be seen anymore for his wound, that he was cleared after last visit.

## 2023-12-12 NOTE — PROGRESS NOTES
Adan Brown is a 60year old male. HPI:     HPI    Pt here for IOP check. Pt is taking Latanoprost both eyes at bedtime as directed. Pt states vision is stable and denies any ocular complaints. Last edited by Gerard Harrison OT on 12/12/2023 11:06 AM.        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    Pigmentary glaucoma of both eyes, moderate stage 02/09/2016 1/30/2020- pt had been lost to follow up since 2016 and came in with IOP of 27 OU and abnormal VF and OCT OS- and was restarted on Latanoprost OS and started Latanoprost OD due to [address]    Sleep apnea     uses cpap on andoff    Visual impairment     readers       Surgical History: Adan Brown has a past surgical history that includes electrocardiogram, complete (02/25/2014) (Scanned to media tab ); colonoscopy (N/A, 09/15/2020) (Procedure: COLONOSCOPY;  Surgeon: Josafat Ballesteros MD;  Location: [address] [place] [address] ENDOSCOPY); colonoscopy; upper gi endoscopy,exam; removal anal fistula,subcutaneous; and cabg.     Family History   Problem Relation Age of Onset    Heart Disease Mother     Heart Disease Maternal Grandmother     Diabetes Neg     Glaucoma Neg     Macular degeneration Neg        Social History:   Social History     Socioeconomic History    Marital status:    Tobacco Use    Smoking status: Never    Smokeless tobacco: Never   Vaping Use    Vaping Use: Never used   Substance and Sexual Activity Alcohol use: Yes     Comment: 1-2 3-4 xx  a wk/wine    Drug use: No   Other Topics Concern    Caffeine Concern Yes     Comment: Daily; coffee, 1 cup     Reaction to local anesthetic No     Social Determinants of Health     Financial Resource Strain: Low Risk  (9/11/2023)    Financial Resource Strain     Difficulty of Paying Living Expenses: Not very hard     Med Affordability: No   Transportation Needs: No Transportation Needs (9/11/2023)    Transportation Needs     Lack of Transportation: No       Medications:  Current Outpatient Medications   Medication Sig Dispense Refill    latanoprost 0.005 % Ophthalmic Solution Place 1 drop into both eyes nightly. Instill 1 drop by ophthalmic route every night into both eyes 7.5 mL 3    levoFLOXacin 500 MG Oral Tab Take 1 tablet (500 mg total) by mouth daily. 7 tablet 0    metRONIDAZOLE 500 MG Oral Tab Take 1 tablet (500 mg total) by mouth every 12 (twelve) hours. 14 tablet 0    HYDROcodone-acetaminophen 5-325 MG Oral Tab Take 1 tablet by mouth every 6 (six) hours as needed. 20 tablet 0    rosuvastatin 40 MG Oral Tab Take 1 tablet (40 mg total) by mouth nightly. 30 tablet 3    aspirin 81 MG Oral Tab EC Take 1 tablet (81 mg total) by mouth daily. 30 tablet 3    metoprolol tartrate 100 MG Oral Tab Take 1 tablet (100 mg total) by mouth 2x Daily(Beta Blocker). 60 tablet 1       Allergies: Allergies   Allergen Reactions    Calamari Oil [Squid Oil] ITCHING and SWELLING    Grass Runny nose    Ragweed ITCHING       ROS:       PHYSICAL EXAM:     Base Eye Exam       Visual Acuity (Snellen - Linear)         Right Left    Dist sc 20/30 +1 20/25              Tonometry (Applanation, 11:09 AM)         Right Left    Pressure 14 14              Pachymetry (09/10/2013)         Right Left    Thickness 530/+1 539/+1/2              Pupils         Pupils    Right PERRL    Left PERRL              Neuro/Psych       Oriented x3:  Yes                  Slit Lamp and Fundus Exam       Slit Lamp Exam Right Left    Lids/Lashes Dermatochalasis, Meibomian gland dysfunction, Puncta normal upper and lower Dermatochalasis, Meibomian gland dysfunction, Puncta normal upper and lower    Conjunctiva/Sclera Trace Injection, Conjunctivochalasis Trace Injection, Conjunctivochalasis    Cornea 1+ K spindles 2+ K spindles    Anterior Chamber Deep and quiet Deep and quiet    Iris Transillumination defects 2, 4, 6 subtle Transillumination defects 360 degrees               Fundus Exam         Right Left    Disc Good rim thin inferior rim     C/D Ratio 0.3 0.6                  Refraction       Wearing Rx         Sphere Cylinder    Right +1.50 Sphere    Left +1.50 Sphere      Type: forgot-Distance only              Wearing Rx #2         Sphere Cylinder    Right +2.50 Sphere    Left +2.50 Sphere      Type: OTC reading only                     ASSESSMENT/PLAN:     Diagnoses and Plan:     Pigmentary glaucoma of both eyes, moderate stage  IOP is stable. Continue Latanoprost at bedtime in both eyes. We will have patient back in 4 months for a visual field, OCT, complete exam and photos    Orders Placed This Encounter   Procedures    [place] Visual Field - OU - Both Eyes    OCT, Optic Nerve - OU - Both Eyes    Fundus Photos - OU - Both Eyes       Meds This Visit:  Requested Prescriptions     Signed Prescriptions Disp Refills    latanoprost 0.005 % Ophthalmic Solution 7.5 mL 3     Sig: Place 1 drop into both eyes nightly. Instill 1 drop by ophthalmic route every night into both eyes        Follow up instructions:  Return in about 4 months (around 4/12/2024) for Visual Field, OCT, complete exam, Photos.     12/12/2023  Scribed by: Deborah Gonzalez MD

## 2023-12-12 NOTE — ASSESSMENT & PLAN NOTE
IOP is stable. Continue Latanoprost at bedtime in both eyes.      We will have patient back in 4 months for a visual field, OCT, complete exam and photos

## 2023-12-13 ENCOUNTER — APPOINTMENT (OUTPATIENT)
Dept: CARDIAC REHAB | Facility: HOSPITAL | Age: 58
End: 2023-12-13
Attending: INTERNAL MEDICINE
Payer: MEDICAID

## 2023-12-14 ENCOUNTER — APPOINTMENT (OUTPATIENT)
Dept: CARDIAC REHAB | Facility: HOSPITAL | Age: 58
End: 2023-12-14
Attending: INTERNAL MEDICINE
Payer: MEDICAID

## 2023-12-18 ENCOUNTER — APPOINTMENT (OUTPATIENT)
Dept: CARDIAC REHAB | Facility: HOSPITAL | Age: 58
End: 2023-12-18
Attending: INTERNAL MEDICINE
Payer: MEDICAID

## 2023-12-18 ENCOUNTER — OFFICE VISIT (OUTPATIENT)
Dept: SURGERY | Facility: CLINIC | Age: 58
End: 2023-12-18
Payer: MEDICAID

## 2023-12-18 DIAGNOSIS — T14.8XXA OPEN WOUND: Primary | ICD-10-CM

## 2023-12-18 PROCEDURE — 99213 OFFICE O/P EST LOW 20 MIN: CPT | Performed by: SURGERY

## 2023-12-18 NOTE — PROGRESS NOTES
General surgery note    Patient is postop after drainage of perianal abscess. This was recurrent but in a different site. No evidence of fistula was seen at the time of surgery. Physical exam-wound has some purulent discharge. There is some superficial granulation tissue. I could not probe with a Q-tip or quarter inch iodoform. All questions answered. I discussed further evaluation with colorectal surgery at [facility].  Continue warm baths and cover with clean gauze.   No antibiotics as there is no cellulitis

## 2023-12-20 ENCOUNTER — APPOINTMENT (OUTPATIENT)
Dept: CARDIAC REHAB | Facility: HOSPITAL | Age: 58
End: 2023-12-20
Attending: INTERNAL MEDICINE
Payer: MEDICAID

## 2023-12-21 ENCOUNTER — APPOINTMENT (OUTPATIENT)
Dept: CARDIAC REHAB | Facility: HOSPITAL | Age: 58
End: 2023-12-21
Attending: INTERNAL MEDICINE
Payer: MEDICAID

## 2023-12-25 ENCOUNTER — APPOINTMENT (OUTPATIENT)
Dept: CARDIAC REHAB | Facility: HOSPITAL | Age: 58
End: 2023-12-25
Attending: INTERNAL MEDICINE
Payer: MEDICAID

## 2023-12-27 ENCOUNTER — APPOINTMENT (OUTPATIENT)
Dept: CARDIAC REHAB | Facility: HOSPITAL | Age: 58
End: 2023-12-27
Attending: INTERNAL MEDICINE
Payer: MEDICAID

## 2023-12-28 ENCOUNTER — APPOINTMENT (OUTPATIENT)
Dept: CARDIAC REHAB | Facility: HOSPITAL | Age: 58
End: 2023-12-28
Attending: INTERNAL MEDICINE
Payer: MEDICAID

## 2024-01-01 ENCOUNTER — APPOINTMENT (OUTPATIENT)
Dept: CARDIAC REHAB | Facility: HOSPITAL | Age: 59
End: 2024-01-01
Attending: INTERNAL MEDICINE
Payer: MEDICAID

## 2024-01-03 ENCOUNTER — APPOINTMENT (OUTPATIENT)
Dept: CARDIAC REHAB | Facility: HOSPITAL | Age: 59
End: 2024-01-03
Attending: INTERNAL MEDICINE
Payer: MEDICAID

## 2024-01-04 ENCOUNTER — MED REC SCAN ONLY (OUTPATIENT)
Dept: INTERNAL MEDICINE CLINIC | Facility: CLINIC | Age: 59
End: 2024-01-04

## 2024-01-04 ENCOUNTER — APPOINTMENT (OUTPATIENT)
Dept: CARDIAC REHAB | Facility: HOSPITAL | Age: 59
End: 2024-01-04
Attending: INTERNAL MEDICINE
Payer: MEDICAID

## 2024-01-08 ENCOUNTER — APPOINTMENT (OUTPATIENT)
Dept: CARDIAC REHAB | Facility: HOSPITAL | Age: 59
End: 2024-01-08
Attending: INTERNAL MEDICINE
Payer: MEDICAID

## 2024-01-08 ENCOUNTER — OFFICE VISIT (OUTPATIENT)
Facility: LOCATION | Age: 59
End: 2024-01-08
Payer: MEDICAID

## 2024-01-08 VITALS — TEMPERATURE: 97 F | HEART RATE: 72 BPM

## 2024-01-08 DIAGNOSIS — L02.215 PERINEAL ABSCESS: Primary | ICD-10-CM

## 2024-01-08 PROCEDURE — 99204 OFFICE O/P NEW MOD 45 MIN: CPT | Performed by: STUDENT IN AN ORGANIZED HEALTH CARE EDUCATION/TRAINING PROGRAM

## 2024-01-10 ENCOUNTER — APPOINTMENT (OUTPATIENT)
Dept: CARDIAC REHAB | Facility: HOSPITAL | Age: 59
End: 2024-01-10
Attending: INTERNAL MEDICINE
Payer: MEDICAID

## 2024-01-10 NOTE — PAT NURSING NOTE
Spoke to patient to discuss procedure with history of abscess. Patient states he will be coming for procedure as he does not have an open wound.

## 2024-01-11 ENCOUNTER — APPOINTMENT (OUTPATIENT)
Dept: CARDIAC REHAB | Facility: HOSPITAL | Age: 59
End: 2024-01-11
Attending: INTERNAL MEDICINE
Payer: MEDICAID
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
## 2024-01-15 ENCOUNTER — APPOINTMENT (OUTPATIENT)
Dept: CARDIAC REHAB | Facility: HOSPITAL | Age: 59
End: 2024-01-15
Attending: INTERNAL MEDICINE
Payer: MEDICAID

## 2024-01-15 ENCOUNTER — TELEPHONE (OUTPATIENT)
Facility: CLINIC | Age: 59
End: 2024-01-15

## 2024-01-15 NOTE — TELEPHONE ENCOUNTER
Received a call back from the patient, /name verified.    Discussed bowel prep instructions.    He verbalized understanding of the instructions given.

## 2024-01-16 ENCOUNTER — TELEPHONE (OUTPATIENT)
Facility: LOCATION | Age: 59
End: 2024-01-16

## 2024-01-16 ENCOUNTER — HOSPITAL ENCOUNTER (OUTPATIENT)
Facility: HOSPITAL | Age: 59
Setting detail: HOSPITAL OUTPATIENT SURGERY
Discharge: HOME | End: 2024-01-16
Attending: INTERNAL MEDICINE | Admitting: INTERNAL MEDICINE
Payer: MEDICAID

## 2024-01-16 ENCOUNTER — ANESTHESIA (OUTPATIENT)
Dept: ENDOSCOPY | Facility: HOSPITAL | Age: 59
End: 2024-01-16
Payer: MEDICAID

## 2024-01-16 ENCOUNTER — ANESTHESIA EVENT (OUTPATIENT)
Dept: ENDOSCOPY | Facility: HOSPITAL | Age: 59
End: 2024-01-16
Payer: MEDICAID

## 2024-01-16 VITALS
HEART RATE: 85 BPM | WEIGHT: 191 LBS | RESPIRATION RATE: 14 BRPM | HEIGHT: 70 IN | OXYGEN SATURATION: 93 % | BODY MASS INDEX: 27.35 KG/M2 | SYSTOLIC BLOOD PRESSURE: 141 MMHG | DIASTOLIC BLOOD PRESSURE: 84 MMHG

## 2024-01-16 DIAGNOSIS — Z86.010 PERSONAL HISTORY OF COLONIC POLYPS: ICD-10-CM

## 2024-01-16 PROCEDURE — 45385 COLONOSCOPY W/LESION REMOVAL: CPT | Performed by: INTERNAL MEDICINE

## 2024-01-16 PROCEDURE — 0DBK8ZX EXCISION OF ASCENDING COLON, VIA NATURAL OR ARTIFICIAL OPENING ENDOSCOPIC, DIAGNOSTIC: ICD-10-PCS | Performed by: INTERNAL MEDICINE

## 2024-01-16 RX ORDER — NALOXONE HYDROCHLORIDE 0.4 MG/ML
0.08 INJECTION, SOLUTION INTRAMUSCULAR; INTRAVENOUS; SUBCUTANEOUS ONCE AS NEEDED
Status: DISCONTINUED | OUTPATIENT
Start: 2024-01-16 | End: 2024-01-16

## 2024-01-16 RX ORDER — LIDOCAINE HYDROCHLORIDE 10 MG/ML
INJECTION, SOLUTION EPIDURAL; INFILTRATION; INTRACAUDAL; PERINEURAL AS NEEDED
Status: DISCONTINUED | OUTPATIENT
Start: 2024-01-16 | End: 2024-01-16 | Stop reason: SURG

## 2024-01-16 RX ORDER — SODIUM CHLORIDE, SODIUM LACTATE, POTASSIUM CHLORIDE, CALCIUM CHLORIDE 600; 310; 30; 20 MG/100ML; MG/100ML; MG/100ML; MG/100ML
INJECTION, SOLUTION INTRAVENOUS CONTINUOUS
OUTPATIENT
Start: 2024-01-16

## 2024-01-16 RX ORDER — SODIUM CHLORIDE, SODIUM LACTATE, POTASSIUM CHLORIDE, CALCIUM CHLORIDE 600; 310; 30; 20 MG/100ML; MG/100ML; MG/100ML; MG/100ML
INJECTION, SOLUTION INTRAVENOUS CONTINUOUS
Status: DISCONTINUED | OUTPATIENT
Start: 2024-01-16 | End: 2024-01-16

## 2024-01-16 RX ORDER — ONDANSETRON 2 MG/ML
4 INJECTION INTRAMUSCULAR; INTRAVENOUS ONCE AS NEEDED
OUTPATIENT
Start: 2024-01-16 | End: 2024-01-16

## 2024-01-16 RX ADMIN — SODIUM CHLORIDE, SODIUM LACTATE, POTASSIUM CHLORIDE, CALCIUM CHLORIDE: 600; 310; 30; 20 INJECTION, SOLUTION INTRAVENOUS at 08:29:00

## 2024-01-16 RX ADMIN — SODIUM CHLORIDE, SODIUM LACTATE, POTASSIUM CHLORIDE, CALCIUM CHLORIDE: 600; 310; 30; 20 INJECTION, SOLUTION INTRAVENOUS at 09:10:00

## 2024-01-16 RX ADMIN — LIDOCAINE HYDROCHLORIDE 50 MG: 10 INJECTION, SOLUTION EPIDURAL; INFILTRATION; INTRACAUDAL; PERINEURAL at 08:33:00

## 2024-01-16 NOTE — TELEPHONE ENCOUNTER
S/w patient who reports increase in the size of \"his bump\".  Denies fever or chills, or drainage.  Reports an increase in discomfort.  Appointment made.  Verbalized understanding.    Future Appointments   Date Time Provider Department Center   1/18/2024  8:30 AM Herman Jiménez MD [facility] [id number]   2/6/2024  1:30 PM [facility] MRI RM2 (3T WIDE) [facility] MRI  Main [place]   2/12/2024  1:15 PM Herman Jiménez MD [facility] [id number]   5/7/2024  9:30 AM Dalton Samson MD [facility]

## 2024-01-16 NOTE — OPERATIVE REPORT
[facility] Endoscopy Report      Date of Procedure:  01/16/24      Preoperative Diagnosis:  1.  Colorectal cancer screening  2.  Personal history of adenomatous colon polyps  3.  Recurrent perirectal abscess      Postoperative Diagnosis:  1.  Diminutive right-sided colon polyps  2.  Colonic diverticulosis      Procedure:    Colonoscopy with polypectomy      Surgeon:  Bhavesh Beltran M.D.      Anesthesia:  Monitored anesthesia care  Cecal withdrawal time: 18 minutes  EBL:  Insignificant      Brief History:  This is a 58 year old male who presents for a screening/surveillance colonoscopy in the setting of a history of #3 adenomatous polyps including a tubulovillous adenoma removed from the colon a little over 3 years prior.  The patient has had a recurrent perirectal abscess.  He has no luminal gastrointestinal tract symptoms or signs.  He has seen Dr. Jiménez and an MRI of the pelvis has been ordered.      Technique:  After informed consent, the patient was placed in the left lateral recumbent position.  Examination of the perineum revealed induration without fluctuance or drainage between the anus and scrotum.  Digital rectal examination revealed no palpable intraluminal abnormalities.  An Olympus variable stiffness 190 series HD colonoscope was inserted into the rectum and advanced under direct vision by following the lumen to the terminal ileum.  The colon was examined upon withdrawal in the left lateral recumbent position.      Findings:  The preparation of the colon was very good.  The terminal ileum was examined for at least 10 cm and visually normal.  There were no signs of Crohn's disease.  The ileocecal valve was well preserved. The visualized colonic mucosa from the cecum to the anal verge was normal with an intact vascular pattern.  There were #3 3-4 mm sessile polyps in the distal ascending colon which were cold snare excised and retrieved.  No ongoing bleeding.  Scattered  diverticula were seen from at least the distal transverse to the sigmoid without current signs of complication.  Diverticula were seen in the right colon previously, however, not well identified on today's examination.  There were no other colonic polyps, mass lesions, vascular anomalies or signs of inflammation seen.  Retroflexion in the rectum revealed no abnormalities.  The procedure was well tolerated without immediate complication.      Impression:  1.  Diminutive colon polyps  2.  Uncomplicated colonic diverticulosis    Recommendations:  1.  High-fiber diet.  2.  Follow-up biopsy results.  3.  Surveillance colonoscopy in 3-5 years adenomatous polyps.  4.  Management of the recurrent perirectal abscess as per Dr. Jiménez.        Bhavesh Beltran MD  1/16/2024

## 2024-01-16 NOTE — H&P
History & Physical Examination    Patient Name: Arnulfo Clark  MRN: [id number]  [facility]: [id number]  YOB: 1965    Diagnosis: Colorectal cancer screening, personal history of adenomatous colon polyps, recurrent perirectal abscess        Medications Prior to Admission   Medication Sig Dispense Refill Last Dose    rosuvastatin 40 MG Oral Tab Take 1 tablet (40 mg total) by mouth nightly. 30 tablet 3 1/15/2024    aspirin 81 MG Oral Tab EC Take 1 tablet (81 mg total) by mouth daily. 30 tablet 3 1/15/2024    metoprolol tartrate 100 MG Oral Tab Take 1 tablet (100 mg total) by mouth 2x Daily(Beta Blocker). 60 tablet 1 1/15/2024    latanoprost 0.005 % Ophthalmic Solution Place 1 drop into both eyes nightly. Instill 1 drop by ophthalmic route every night into both eyes 7.5 mL 3     levoFLOXacin 500 MG Oral Tab Take 1 tablet (500 mg total) by mouth daily. 7 tablet 0     metRONIDAZOLE 500 MG Oral Tab Take 1 tablet (500 mg total) by mouth every 12 (twelve) hours. 14 tablet 0     HYDROcodone-acetaminophen 5-325 MG Oral Tab Take 1 tablet by mouth every 6 (six) hours as needed. 20 tablet 0      Current Facility-Administered Medications   Medication Dose Route Frequency    lactated ringers infusion   Intravenous Continuous       Allergies:   Allergies   Allergen Reactions    Calamari Oil [Squid Oil] ITCHING and SWELLING    Grass Runny nose    Ragweed ITCHING       Past Medical History:   Diagnosis Date    Age-related nuclear cataract of both eyes 02/09/2016    Back problem     Coronary atherosclerosis     Diabetes mellitus type 2 without retinopathy (HCC) 02/09/2016    Esophageal reflux     Glaucoma 10/31/2015    started on Latanoprost qhs OS after abnormal OCT result 10/31/15    Heart attack (HCC)     High blood pressure     High cholesterol     History of blood transfusion     Hx of motion sickness     Lipid screening 06/24/2014    Lumbar herniated disc 2010    Physical Therapy     MANAN on CPAP 12/17/2020    Pigmentary  dispersion syndrome 02/09/2016    Pigmentary glaucoma of both eyes, moderate stage 02/09/2016 1/30/2020- pt had been lost to follow up since 2016 and came in with IOP of 27 OU and abnormal VF and OCT OS- and was restarted on Latanoprost OS and started Latanoprost OD due to OHT    PONV (postoperative nausea and vomiting)     Sleep apnea     uses cpap on andoff    Visual impairment     readers     Past Surgical History:   Procedure Laterality Date    CABG      COLONOSCOPY N/A 09/15/2020    Procedure: COLONOSCOPY;  Surgeon: Bhavesh Beltran MD;  Location: [facility] ENDOSCOPY    COLONOSCOPY      ELECTROCARDIOGRAM, COMPLETE  02/25/2014    Scanned to media tab     REMOVAL ANAL FISTULA,SUBCUTANEOUS      UPPER GI ENDOSCOPY,EXAM       Family History   Problem Relation Age of Onset    Heart Disease Mother     Heart Disease Maternal Grandmother     Diabetes Neg     Glaucoma Neg     Macular degeneration Neg      Social History     Tobacco Use    Smoking status: Never    Smokeless tobacco: Never   Substance Use Topics    Alcohol use: Yes     Comment: daily glass of wine       SYSTEM Check if Review is Normal Check if Physical Exam is Normal If not normal, please explain:   HEENT [X ] [ X]    NECK  [X ] [ X]    HEART [X ] [ X]    LUNGS [X ] [ X]    ABDOMEN [X ] [ X]    EXTREMITIES [X ] [ X]    OTHER        [ x ] I have discussed the risks and benefits and alternatives with the patient/family.  They understand and agree to proceed with plan of care.  [ x ] I have reviewed the History and Physical done within the last 30 days.  Any changes noted above.    Bhavesh Beltran MD  1/16/2024  8:37 AM

## 2024-01-16 NOTE — ANESTHESIA POSTPROCEDURE EVALUATION
Patient: Arnulfo Clark    Procedure Summary       Date: 01/16/24 Room / Location: [facility] ENDOSCOPY 04 / EM ENDOSCOPY    Anesthesia Start: 0829 Anesthesia Stop:     Procedure: COLONOSCOPY Diagnosis:       Personal history of colonic polyps      (Colon polyps, diverticulosis)    Surgeons: Bhavesh Beltran MD Anesthesiologist: Sparkle Baltazar CRNA    Anesthesia Type: general ASA Status: 3            Anesthesia Type: general    Vitals Value Taken Time   BP 91/62 01/16/24 0910   Temp  01/16/24 0911   Pulse 77 01/16/24 0910   Resp 16 01/16/24 0910   SpO2 93 % 01/16/24 0910       [facility] AN Post Evaluation:   Patient Evaluated in PACU  Patient Participation: complete - patient participated  Level of Consciousness: sleepy but conscious  Pain Score: 0  Pain Management: adequate  Airway Patency:patent  Dental exam unchanged from preop  Yes    Cardiovascular Status: hemodynamically stable  Respiratory Status: room air  Postoperative Hydration acceptable      Sparkle Baltazar CRNA  1/16/2024 9:11 AM

## 2024-01-16 NOTE — TELEPHONE ENCOUNTER
Patient says that the lump that was checked out has gotten bigger, and he's wondering if it needs to be drained.     Please advise  Best callback number is [phone]    Future Appointments   Date Time Provider Department Center   2/6/2024  1:30 PM [facility] MRI RM2 (3T WIDE) [facility] MRI  Main [place]   2/12/2024  1:15 PM Herman Jiménez MD [facility] [id number]   5/7/2024  9:30 AM Dalton Samson MD [facility]

## 2024-01-16 NOTE — ANESTHESIA PREPROCEDURE EVALUATION
Anesthesia PreOp Note    HPI:     Arnulfo Clark is a 58 year old male who presents for preoperative consultation requested by: Bhavesh Beltran MD    Date of Surgery: 1/16/2024    Procedure(s):  COLONOSCOPY  Indication: Personal history of colonic polyps    Relevant Problems   No relevant active problems       NPO:  Last Liquid Consumption Date: 01/16/24  Last Liquid Consumption Time: 0400  Last Solid Consumption Date: 01/15/24  Last Solid Consumption Time: 0700  Last Liquid Consumption Date: 01/16/24          History Review:  Patient Active Problem List    Diagnosis Date Noted    History of coronary artery bypass graft 09/26/2023    Coronary artery disease involving native coronary artery of native heart 09/06/2023    NSTEMI (non-ST elevated myocardial infarction) (HCC) 09/01/2023    Tearing eyes 01/17/2023    Conjunctivochalasis of both eyes 01/17/2023    Meibomian gland dysfunction (MGD) of both eyes 03/22/2022    MANAN on CPAP 12/17/2020    Vitreous floaters of both eyes 01/30/2020    Primary hypertension 10/29/2019    Allergic reaction 03/28/2019    Impaired fasting glucose 05/17/2018    Hypercholesterolemia 05/17/2018    Pigmentary glaucoma of both eyes, moderate stage 02/09/2016    Age-related nuclear cataract of both eyes 02/09/2016    Hyperopia with presbyopia 10/22/2015       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    Pigmentary glaucoma of both eyes,  moderate stage 02/09/2016 1/30/2020- pt had been lost to follow up since 2016 and came in with IOP of 27 OU and abnormal VF and OCT OS- and was restarted on Latanoprost OS and started Latanoprost OD due to OHT    PONV (postoperative nausea and vomiting)     Sleep apnea     uses cpap on andoff    Visual impairment     readers       Past Surgical History:   Procedure Laterality Date    CABG      COLONOSCOPY N/A 09/15/2020    Procedure: COLONOSCOPY;  Surgeon: Bhavesh Beltran MD;  Location: [facility] ENDOSCOPY    COLONOSCOPY      ELECTROCARDIOGRAM, COMPLETE  02/25/2014    Scanned to media tab     REMOVAL ANAL FISTULA,SUBCUTANEOUS      UPPER GI ENDOSCOPY,EXAM         Medications Prior to Admission   Medication Sig Dispense Refill Last Dose    rosuvastatin 40 MG Oral Tab Take 1 tablet (40 mg total) by mouth nightly. 30 tablet 3 1/15/2024    aspirin 81 MG Oral Tab EC Take 1 tablet (81 mg total) by mouth daily. 30 tablet 3 1/15/2024    metoprolol tartrate 100 MG Oral Tab Take 1 tablet (100 mg total) by mouth 2x Daily(Beta Blocker). 60 tablet 1 1/15/2024    latanoprost 0.005 % Ophthalmic Solution Place 1 drop into both eyes nightly. Instill 1 drop by ophthalmic route every night into both eyes 7.5 mL 3     levoFLOXacin 500 MG Oral Tab Take 1 tablet (500 mg total) by mouth daily. 7 tablet 0     metRONIDAZOLE 500 MG Oral Tab Take 1 tablet (500 mg total) by mouth every 12 (twelve) hours. 14 tablet 0     HYDROcodone-acetaminophen 5-325 MG Oral Tab Take 1 tablet by mouth every 6 (six) hours as needed. 20 tablet 0      Current Facility-Administered Medications Ordered in Epic   Medication Dose Route Frequency Provider Last Rate Last Admin    lactated ringers infusion   Intravenous Continuous Bhavesh Beltran MD         No current [facility]-ordered outpatient medications on file.       Allergies   Allergen Reactions    Calamari Oil [Squid Oil] ITCHING and SWELLING    Grass Runny nose    Ragweed ITCHING       Family History    Problem Relation Age of Onset    Heart Disease Mother     Heart Disease Maternal Grandmother     Diabetes Neg     Glaucoma Neg     Macular degeneration Neg      Social History     Socioeconomic History    Marital status:    Tobacco Use    Smoking status: Never    Smokeless tobacco: Never   Vaping Use    Vaping Use: Never used   Substance and Sexual Activity    Alcohol use: Yes     Comment: daily glass of wine    Drug use: No   Other Topics Concern    Caffeine Concern Yes     Comment: Daily; coffee, 1 cup     Reaction to local anesthetic No       Available pre-op labs reviewed.  Lab Results   Component Value Date    WBC 9.1 10/18/2023    RBC 4.58 10/18/2023    HGB 13.5 10/18/2023    HCT 39.9 10/18/2023    MCV 87.1 10/18/2023    MCH 29.5 10/18/2023    MCHC 33.8 10/18/2023    RDW 12.6 10/18/2023    .0 10/18/2023     Lab Results   Component Value Date     10/18/2023    K 3.7 10/18/2023     10/18/2023    CO2 27.0 10/18/2023    BUN 13 10/18/2023    CREATSERUM 0.95 10/18/2023    GLU 99 10/18/2023    CA 8.9 10/18/2023          Vital Signs:  Body mass index is 27.41 kg/m².   height is 1.778 m (5' 10\") and weight is 86.6 kg (191 lb). His blood pressure is 135/93 (abnormal) and his pulse is 85. His respiration is 12 and oxygen saturation is 94%.   Vitals:    01/08/24 0930 01/16/24 0733   BP:  (!) 135/93   Pulse:  85   Resp:  12   SpO2:  94%   Weight: 86.6 kg (191 lb)    Height: 1.778 m (5' 10\")         Anesthesia Evaluation     Patient summary reviewed and Nursing notes reviewed    History of anesthetic complications (PONV)   Airway   Mallampati: II  TM distance: <3 FB  Neck ROM: full  Dental      Pulmonary     breath sounds clear to auscultation  (+) sleep apnea (does not wear c-pap)  Cardiovascular   (+) hypertension, past MI, CAD, CABG/stent (CABG x 4 in 8/2023)    Rhythm: regular  Rate: normal  ROS comment: Denies any recent chest pain or sob     Neuro/Psych - negative ROS     GI/Hepatic/Renal     (+) GERD (intermittently, denies any symptoms today), bowel prep (finished bowel prep at 0400)    Comments: Currently has anal abscess that has been debrided in the past    Endo/Other - negative ROS   Abdominal                  Anesthesia Plan:   ASA:  3  Plan:   General  Informed Consent Plan and Risks Discussed With:  Patient      I have informed Arnulfo Clark and/or legal guardian or family member of the nature of the anesthetic plan, benefits, risks including possible dental damage if relevant, major complications, and any alternative forms of anesthetic management.   All of the patient's questions were answered to the best of my ability. The patient desires the anesthetic management as planned.  Sparkle Baltazar CRNA  1/16/2024 7:47 AM  Present on Admission:  **None**

## 2024-01-16 NOTE — DISCHARGE INSTRUCTIONS
Home Care Instructions for Colonoscopy with Sedation    Diet:  - Resume your regular diet as tolerated unless otherwise instructed.  - Start with light meals to minimize bloating.  - Do not drink alcohol today.    Medication:  - If you have questions about resuming your normal medications, please contact your Primary Care Physician.    Activities:  - Take it easy today. Do not return to work today.  - Do not drive today.  - Do not operate any machinery today (including kitchen equipment).    Colonoscopy:  - You may notice some rectal \"spotting\" (a little blood on the toilet tissue) for a day or two after the exam. This is normal.  - If you experience any rectal bleeding (not spotting), persistent tenderness or sharp severe abdominal pains, oral temperature over 100 degrees Fahrenheit, light-headedness or dizziness, or any other problems, contact your doctor.    **If unable to reach your doctor, please go to the [facility] Emergency Room**    - Your referring physician will receive a full report of your examination.  - If you do not hear from your doctor's office within two weeks of your biopsy, please call them for your results.    You may be able to see your laboratory results in "[employer] between 4 and 7 business days.  In some cases, your physician may not have viewed the results before they are released to "[employer]  If you have questions regarding your results contact the physician who ordered the test/exam by phone or via "[employer] by choosing \"Ask a Medical Question.\"

## 2024-01-17 ENCOUNTER — APPOINTMENT (OUTPATIENT)
Dept: CARDIAC REHAB | Facility: HOSPITAL | Age: 59
End: 2024-01-17
Attending: INTERNAL MEDICINE
Payer: MEDICAID

## 2024-01-18 ENCOUNTER — OFFICE VISIT (OUTPATIENT)
Facility: LOCATION | Age: 59
End: 2024-01-18
Payer: MEDICAID

## 2024-01-18 ENCOUNTER — APPOINTMENT (OUTPATIENT)
Dept: CARDIAC REHAB | Facility: HOSPITAL | Age: 59
End: 2024-01-18
Attending: INTERNAL MEDICINE
Payer: MEDICAID

## 2024-01-18 ENCOUNTER — TELEPHONE (OUTPATIENT)
Facility: CLINIC | Age: 59
End: 2024-01-18

## 2024-01-18 VITALS — TEMPERATURE: 97 F | HEART RATE: 72 BPM

## 2024-01-18 DIAGNOSIS — L02.215 PERINEAL ABSCESS: Primary | ICD-10-CM

## 2024-01-19 NOTE — TELEPHONE ENCOUNTER
----- Message from Bhavesh Beltran MD sent at 1/18/2024  5:04 PM CST -----  I spoke to the patient.  He is feeling well.  He had #3 subcentimeter tubular adenomas removed.  I discussed the significance.  He has had adenomatous colon polyps removed in the past as well.  Uncomplicated diverticulosis was present.  I have recommended a surveillance colonoscopy in 3 years.  The patient will continue workup and treatment of the recurrent perirectal abscess as per Dr. Jiménez.    GI RNs: Please enter colonoscopy recall for 3 years.

## 2024-01-19 NOTE — TELEPHONE ENCOUNTER
Health maintenance updated. Last colonoscopy done 1/16/24. 3 year recall placed into [facility]reach, next due on 01/2027 per Dr. Beltran.
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
## 2024-01-22 ENCOUNTER — MED REC SCAN ONLY (OUTPATIENT)
Facility: CLINIC | Age: 59
End: 2024-01-22

## 2024-01-22 ENCOUNTER — APPOINTMENT (OUTPATIENT)
Dept: CARDIAC REHAB | Facility: HOSPITAL | Age: 59
End: 2024-01-22
Attending: INTERNAL MEDICINE
Payer: MEDICAID

## 2024-01-24 ENCOUNTER — APPOINTMENT (OUTPATIENT)
Dept: CARDIAC REHAB | Facility: HOSPITAL | Age: 59
End: 2024-01-24
Attending: INTERNAL MEDICINE
Payer: MEDICAID

## 2024-01-25 ENCOUNTER — APPOINTMENT (OUTPATIENT)
Dept: CARDIAC REHAB | Facility: HOSPITAL | Age: 59
End: 2024-01-25
Attending: INTERNAL MEDICINE
Payer: MEDICAID

## 2024-01-29 ENCOUNTER — APPOINTMENT (OUTPATIENT)
Dept: CARDIAC REHAB | Facility: HOSPITAL | Age: 59
End: 2024-01-29
Attending: INTERNAL MEDICINE
Payer: MEDICAID

## 2024-01-31 ENCOUNTER — APPOINTMENT (OUTPATIENT)
Dept: CARDIAC REHAB | Facility: HOSPITAL | Age: 59
End: 2024-01-31
Attending: INTERNAL MEDICINE
Payer: MEDICAID

## 2024-02-01 ENCOUNTER — APPOINTMENT (OUTPATIENT)
Dept: CARDIAC REHAB | Facility: HOSPITAL | Age: 59
End: 2024-02-01
Attending: INTERNAL MEDICINE
Payer: MEDICAID

## 2024-02-05 ENCOUNTER — APPOINTMENT (OUTPATIENT)
Dept: CARDIAC REHAB | Facility: HOSPITAL | Age: 59
End: 2024-02-05
Attending: INTERNAL MEDICINE
Payer: MEDICAID

## 2024-02-06 ENCOUNTER — HOSPITAL ENCOUNTER (OUTPATIENT)
Dept: MRI IMAGING | Facility: HOSPITAL | Age: 59
Discharge: HOME | End: 2024-02-06
Attending: STUDENT IN AN ORGANIZED HEALTH CARE EDUCATION/TRAINING PROGRAM
Payer: MEDICAID

## 2024-02-06 DIAGNOSIS — L02.215 PERINEAL ABSCESS: ICD-10-CM

## 2024-02-06 PROCEDURE — A9575 INJ GADOTERATE MEGLUMI 0.1ML: HCPCS | Performed by: STUDENT IN AN ORGANIZED HEALTH CARE EDUCATION/TRAINING PROGRAM

## 2024-02-06 PROCEDURE — 72197 MRI PELVIS W/O & W/DYE: CPT | Performed by: STUDENT IN AN ORGANIZED HEALTH CARE EDUCATION/TRAINING PROGRAM

## 2024-02-06 RX ORDER — GADOTERATE MEGLUMINE 376.9 MG/ML
15 INJECTION INTRAVENOUS
Status: COMPLETED | OUTPATIENT
Start: 2024-02-06 | End: 2024-02-06

## 2024-02-06 RX ADMIN — GADOTERATE MEGLUMINE 15 ML: 376.9 INJECTION INTRAVENOUS at 14:46:00

## 2024-02-07 ENCOUNTER — APPOINTMENT (OUTPATIENT)
Dept: CARDIAC REHAB | Facility: HOSPITAL | Age: 59
End: 2024-02-07
Attending: INTERNAL MEDICINE
Payer: MEDICAID

## 2024-02-08 ENCOUNTER — APPOINTMENT (OUTPATIENT)
Dept: CARDIAC REHAB | Facility: HOSPITAL | Age: 59
End: 2024-02-08
Attending: INTERNAL MEDICINE
Payer: MEDICAID

## 2024-02-12 ENCOUNTER — TELEPHONE (OUTPATIENT)
Facility: LOCATION | Age: 59
End: 2024-02-12

## 2024-02-12 ENCOUNTER — OFFICE VISIT (OUTPATIENT)
Facility: LOCATION | Age: 59
End: 2024-02-12
Payer: MEDICAID

## 2024-02-12 ENCOUNTER — APPOINTMENT (OUTPATIENT)
Dept: CARDIAC REHAB | Facility: HOSPITAL | Age: 59
End: 2024-02-12
Attending: INTERNAL MEDICINE
Payer: MEDICAID

## 2024-02-12 VITALS — TEMPERATURE: 98 F | HEART RATE: 81 BPM

## 2024-02-12 DIAGNOSIS — K60.3 ANAL FISTULA: Primary | ICD-10-CM
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
## 2024-02-12 NOTE — TELEPHONE ENCOUNTER
INEZ WHALEY Patient  Member ID  [id number]    Date of Birth  1965-05-10    Gender  Male    Transaction Type  Outpatient Authorization    Organization  [facility]    Payer  [facility] logo     Certificate Information  Reference Number  [id number]    Status  NO ACTION REQUIRED    Message  Requested Service does not require preauthorization. We would strongly encourage you to check benefits for this service.    Member Information  Patient Name  INEZ WHALEY    Patient Date of Birth  1965-05-10    Patient Gender  Male    Member ID  [id number]    Relationship to Subscriber  Self    Subscriber Name  INEZ WHALEY    Requesting Provider     Name  JOSE R FAN    NPI  [id number]    Tax Id  [id number]    Specialty  [id number]  Provider Role  Provider    Address  [address] [place] [address]    Phone  [phone]  Fax  [phone]    Contact Name  AIDA CESAR    Service Information  Service Type  2 - Surgical    Place of Service  [facility]    Service From - To Date  2024-02-23 - 2024-04-24    Level of Service  Elective    Diagnosis Code 1  K603 - Anal fistula    Procedure Code 1 (CPT/HCPCS)  50514 - SURG DX EXAM ANORECTAL    Quantity  1 Units    Status  NO ACTION REQUIRED    Procedure Code 2 (CPT/HCPCS)  52459 - REMOVE ANAL FIST INTER    Quantity  1 Units    Status  NO ACTION REQUIRED    Procedure Code 3 (CPT/HCPCS)  03672 - INCISION OF RECTAL ABSCESS    Quantity  1 Units    Status  NO ACTION REQUIRED    Rendering Provider/Facility     Provider 1  Name  JOSE R FAN    NPI  [id number]    Specialty  [id number]  Provider Role  Attending    Address  [address] [place] [address]    Provider 2  Name  [facility]    NPI  [id number]    Provider Role  Facility    Address  [address] [place] [address]
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
## 2024-02-13 ENCOUNTER — TELEPHONE (OUTPATIENT)
Facility: LOCATION | Age: 59
End: 2024-02-13

## 2024-02-13 ENCOUNTER — EKG ENCOUNTER (OUTPATIENT)
Dept: LAB | Facility: HOSPITAL | Age: 59
End: 2024-02-13
Attending: STUDENT IN AN ORGANIZED HEALTH CARE EDUCATION/TRAINING PROGRAM
Payer: MEDICAID

## 2024-02-13 DIAGNOSIS — Z01.818 PRE-OP TESTING: ICD-10-CM

## 2024-02-13 LAB
ANION GAP SERPL CALC-SCNC: 2 MMOL/L (ref 0–18)
ATRIAL RATE: 70 BPM
BUN BLD-MCNC: 12 MG/DL (ref 9–23)
BUN/CREAT SERPL: 13.8 (ref 10–20)
CALCIUM BLD-MCNC: 9.3 MG/DL (ref 8.7–10.4)
CHLORIDE SERPL-SCNC: 107 MMOL/L (ref 98–112)
CO2 SERPL-SCNC: 28 MMOL/L (ref 21–32)
CREAT BLD-MCNC: 0.87 MG/DL
EGFRCR SERPLBLD CKD-EPI 2021: 99 ML/MIN/1.73M2 (ref 60–?)
FASTING STATUS PATIENT QL REPORTED: NO
GLUCOSE BLD-MCNC: 109 MG/DL (ref 70–99)
OSMOLALITY SERPL CALC.SUM OF ELEC: 284 MOSM/KG (ref 275–295)
P AXIS: 26 DEGREES
P-R INTERVAL: 170 MS
POTASSIUM SERPL-SCNC: 3.7 MMOL/L (ref 3.5–5.1)
Q-T INTERVAL: 458 MS
QRS DURATION: 108 MS
QTC CALCULATION (BEZET): 494 MS
R AXIS: 34 DEGREES
SODIUM SERPL-SCNC: 137 MMOL/L (ref 136–145)
T AXIS: 32 DEGREES
VENTRICULAR RATE: 70 BPM

## 2024-02-13 PROCEDURE — 36415 COLL VENOUS BLD VENIPUNCTURE: CPT

## 2024-02-13 PROCEDURE — 93005 ELECTROCARDIOGRAM TRACING: CPT

## 2024-02-13 PROCEDURE — 93010 ELECTROCARDIOGRAM REPORT: CPT | Performed by: INTERNAL MEDICINE

## 2024-02-13 PROCEDURE — 80048 BASIC METABOLIC PNL TOTAL CA: CPT

## 2024-02-13 NOTE — TELEPHONE ENCOUNTER
Cardiac Clearance received from Linda Schmidt APRN.  Per note \"Low risk for Anal Exam under Anesthesia, Poss Fistulotomy.  If needed may hold aspirin for 5 days pre op.  Faxed to Pre admission testing and placed in binder.

## 2024-02-14 ENCOUNTER — APPOINTMENT (OUTPATIENT)
Dept: CARDIAC REHAB | Facility: HOSPITAL | Age: 59
End: 2024-02-14
Attending: INTERNAL MEDICINE
Payer: MEDICAID

## 2024-02-15 ENCOUNTER — APPOINTMENT (OUTPATIENT)
Dept: CARDIAC REHAB | Facility: HOSPITAL | Age: 59
End: 2024-02-15
Attending: INTERNAL MEDICINE
Payer: MEDICAID

## 2024-02-19 ENCOUNTER — APPOINTMENT (OUTPATIENT)
Dept: CARDIAC REHAB | Facility: HOSPITAL | Age: 59
End: 2024-02-19
Attending: INTERNAL MEDICINE
Payer: MEDICAID

## 2024-02-21 ENCOUNTER — APPOINTMENT (OUTPATIENT)
Dept: CARDIAC REHAB | Facility: HOSPITAL | Age: 59
End: 2024-02-21
Attending: INTERNAL MEDICINE
Payer: MEDICAID

## 2024-02-22 ENCOUNTER — APPOINTMENT (OUTPATIENT)
Dept: CARDIAC REHAB | Facility: HOSPITAL | Age: 59
End: 2024-02-22
Attending: INTERNAL MEDICINE
Payer: MEDICAID

## 2024-02-23 ENCOUNTER — ANESTHESIA (OUTPATIENT)
Dept: SURGERY | Facility: HOSPITAL | Age: 59
End: 2024-02-23
Payer: MEDICAID

## 2024-02-23 ENCOUNTER — ANESTHESIA EVENT (OUTPATIENT)
Dept: SURGERY | Facility: HOSPITAL | Age: 59
End: 2024-02-23
Payer: MEDICAID

## 2024-02-23 ENCOUNTER — HOSPITAL ENCOUNTER (OUTPATIENT)
Facility: HOSPITAL | Age: 59
Setting detail: HOSPITAL OUTPATIENT SURGERY
Discharge: HOME | End: 2024-02-23
Attending: STUDENT IN AN ORGANIZED HEALTH CARE EDUCATION/TRAINING PROGRAM | Admitting: STUDENT IN AN ORGANIZED HEALTH CARE EDUCATION/TRAINING PROGRAM
Payer: MEDICAID

## 2024-02-23 VITALS
RESPIRATION RATE: 16 BRPM | BODY MASS INDEX: 28.69 KG/M2 | DIASTOLIC BLOOD PRESSURE: 86 MMHG | TEMPERATURE: 97 F | OXYGEN SATURATION: 98 % | WEIGHT: 200.38 LBS | SYSTOLIC BLOOD PRESSURE: 151 MMHG | HEART RATE: 66 BPM | HEIGHT: 70 IN

## 2024-02-23 DIAGNOSIS — Z01.818 PRE-OP TESTING: ICD-10-CM

## 2024-02-23 DIAGNOSIS — K60.3 ANAL FISTULA: Primary | ICD-10-CM

## 2024-02-23 PROBLEM — K60.30 ANAL FISTULA: Status: ACTIVE | Noted: 2024-02-23

## 2024-02-23 PROCEDURE — 46020 PLACEMENT OF SETON: CPT | Performed by: STUDENT IN AN ORGANIZED HEALTH CARE EDUCATION/TRAINING PROGRAM

## 2024-02-23 PROCEDURE — 46275 REMOVE ANAL FIST INTER: CPT | Performed by: STUDENT IN AN ORGANIZED HEALTH CARE EDUCATION/TRAINING PROGRAM

## 2024-02-23 PROCEDURE — 0DBQ8ZZ EXCISION OF ANUS, VIA NATURAL OR ARTIFICIAL OPENING ENDOSCOPIC: ICD-10-PCS | Performed by: STUDENT IN AN ORGANIZED HEALTH CARE EDUCATION/TRAINING PROGRAM

## 2024-02-23 RX ORDER — SCOLOPAMINE TRANSDERMAL SYSTEM 1 MG/1
1 PATCH, EXTENDED RELEASE TRANSDERMAL ONCE
Status: DISCONTINUED | OUTPATIENT
Start: 2024-02-23 | End: 2024-02-23 | Stop reason: HOSPADM

## 2024-02-23 RX ORDER — DROPERIDOL 2.5 MG/ML
0.62 INJECTION, SOLUTION INTRAMUSCULAR; INTRAVENOUS ONCE AS NEEDED
Status: DISCONTINUED | OUTPATIENT
Start: 2024-02-23 | End: 2024-02-23

## 2024-02-23 RX ORDER — PROCHLORPERAZINE EDISYLATE 5 MG/ML
5 INJECTION INTRAMUSCULAR; INTRAVENOUS EVERY 8 HOURS PRN
Status: DISCONTINUED | OUTPATIENT
Start: 2024-02-23 | End: 2024-02-23

## 2024-02-23 RX ORDER — PHENYLEPHRINE HCL 10 MG/ML
VIAL (ML) INJECTION AS NEEDED
Status: DISCONTINUED | OUTPATIENT
Start: 2024-02-23 | End: 2024-02-23 | Stop reason: SURG

## 2024-02-23 RX ORDER — LIDOCAINE HYDROCHLORIDE 10 MG/ML
INJECTION, SOLUTION EPIDURAL; INFILTRATION; INTRACAUDAL; PERINEURAL AS NEEDED
Status: DISCONTINUED | OUTPATIENT
Start: 2024-02-23 | End: 2024-02-23 | Stop reason: SURG

## 2024-02-23 RX ORDER — METRONIDAZOLE 500 MG/100ML
500 INJECTION, SOLUTION INTRAVENOUS ONCE
Status: COMPLETED | OUTPATIENT
Start: 2024-02-23 | End: 2024-02-23

## 2024-02-23 RX ORDER — DEXTROSE MONOHYDRATE 25 G/50ML
50 INJECTION, SOLUTION INTRAVENOUS
Status: DISCONTINUED | OUTPATIENT
Start: 2024-02-23 | End: 2024-02-23

## 2024-02-23 RX ORDER — HYDROMORPHONE HYDROCHLORIDE 1 MG/ML
0.6 INJECTION, SOLUTION INTRAMUSCULAR; INTRAVENOUS; SUBCUTANEOUS EVERY 5 MIN PRN
Status: DISCONTINUED | OUTPATIENT
Start: 2024-02-23 | End: 2024-02-23

## 2024-02-23 RX ORDER — HYDROCODONE BITARTRATE AND ACETAMINOPHEN 5; 325 MG/1; MG/1
1 TABLET ORAL ONCE AS NEEDED
Status: COMPLETED | OUTPATIENT
Start: 2024-02-23 | End: 2024-02-23

## 2024-02-23 RX ORDER — HYDROCODONE BITARTRATE AND ACETAMINOPHEN 5; 325 MG/1; MG/1
1-2 TABLET ORAL EVERY 6 HOURS PRN
Qty: 6 TABLET | Refills: 0 | Status: SHIPPED | OUTPATIENT
Start: 2024-02-23

## 2024-02-23 RX ORDER — NALOXONE HYDROCHLORIDE 0.4 MG/ML
0.08 INJECTION, SOLUTION INTRAMUSCULAR; INTRAVENOUS; SUBCUTANEOUS AS NEEDED
Status: DISCONTINUED | OUTPATIENT
Start: 2024-02-23 | End: 2024-02-23

## 2024-02-23 RX ORDER — HYDROMORPHONE HYDROCHLORIDE 1 MG/ML
0.4 INJECTION, SOLUTION INTRAMUSCULAR; INTRAVENOUS; SUBCUTANEOUS EVERY 5 MIN PRN
Status: DISCONTINUED | OUTPATIENT
Start: 2024-02-23 | End: 2024-02-23

## 2024-02-23 RX ORDER — ACETAMINOPHEN 500 MG
1000 TABLET ORAL ONCE AS NEEDED
Status: COMPLETED | OUTPATIENT
Start: 2024-02-23 | End: 2024-02-23

## 2024-02-23 RX ORDER — HYDROCODONE BITARTRATE AND ACETAMINOPHEN 5; 325 MG/1; MG/1
2 TABLET ORAL ONCE AS NEEDED
Status: COMPLETED | OUTPATIENT
Start: 2024-02-23 | End: 2024-02-23

## 2024-02-23 RX ORDER — ACETAMINOPHEN 500 MG
1000 TABLET ORAL ONCE
Status: DISCONTINUED | OUTPATIENT
Start: 2024-02-23 | End: 2024-02-23 | Stop reason: HOSPADM

## 2024-02-23 RX ORDER — ONDANSETRON 2 MG/ML
4 INJECTION INTRAMUSCULAR; INTRAVENOUS EVERY 6 HOURS PRN
Status: DISCONTINUED | OUTPATIENT
Start: 2024-02-23 | End: 2024-02-23

## 2024-02-23 RX ORDER — CEFAZOLIN SODIUM/WATER 2 G/20 ML
2 SYRINGE (ML) INTRAVENOUS ONCE
Status: COMPLETED | OUTPATIENT
Start: 2024-02-23 | End: 2024-02-23

## 2024-02-23 RX ORDER — HYDROMORPHONE HYDROCHLORIDE 1 MG/ML
0.2 INJECTION, SOLUTION INTRAMUSCULAR; INTRAVENOUS; SUBCUTANEOUS EVERY 5 MIN PRN
Status: DISCONTINUED | OUTPATIENT
Start: 2024-02-23 | End: 2024-02-23

## 2024-02-23 RX ORDER — SODIUM CHLORIDE, SODIUM LACTATE, POTASSIUM CHLORIDE, CALCIUM CHLORIDE 600; 310; 30; 20 MG/100ML; MG/100ML; MG/100ML; MG/100ML
INJECTION, SOLUTION INTRAVENOUS CONTINUOUS
Status: DISCONTINUED | OUTPATIENT
Start: 2024-02-23 | End: 2024-02-23

## 2024-02-23 RX ORDER — DEXAMETHASONE SODIUM PHOSPHATE 4 MG/ML
VIAL (ML) INJECTION AS NEEDED
Status: DISCONTINUED | OUTPATIENT
Start: 2024-02-23 | End: 2024-02-23 | Stop reason: SURG

## 2024-02-23 RX ORDER — ONDANSETRON 2 MG/ML
INJECTION INTRAMUSCULAR; INTRAVENOUS AS NEEDED
Status: DISCONTINUED | OUTPATIENT
Start: 2024-02-23 | End: 2024-02-23 | Stop reason: SURG

## 2024-02-23 RX ORDER — INSULIN ASPART 100 [IU]/ML
INJECTION, SOLUTION INTRAVENOUS; SUBCUTANEOUS ONCE
Status: DISCONTINUED | OUTPATIENT
Start: 2024-02-23 | End: 2024-02-23

## 2024-02-23 RX ORDER — NICOTINE POLACRILEX 4 MG
30 LOZENGE BUCCAL
Status: DISCONTINUED | OUTPATIENT
Start: 2024-02-23 | End: 2024-02-23

## 2024-02-23 RX ORDER — BUPIVACAINE HYDROCHLORIDE AND EPINEPHRINE 5; 5 MG/ML; UG/ML
INJECTION, SOLUTION EPIDURAL; INTRACAUDAL; PERINEURAL AS NEEDED
Status: DISCONTINUED | OUTPATIENT
Start: 2024-02-23 | End: 2024-02-23 | Stop reason: HOSPADM

## 2024-02-23 RX ORDER — MIDAZOLAM HYDROCHLORIDE 1 MG/ML
INJECTION INTRAMUSCULAR; INTRAVENOUS AS NEEDED
Status: DISCONTINUED | OUTPATIENT
Start: 2024-02-23 | End: 2024-02-23 | Stop reason: SURG

## 2024-02-23 RX ORDER — NICOTINE POLACRILEX 4 MG
15 LOZENGE BUCCAL
Status: DISCONTINUED | OUTPATIENT
Start: 2024-02-23 | End: 2024-02-23

## 2024-02-23 RX ADMIN — DEXAMETHASONE SODIUM PHOSPHATE 4 MG: 4 MG/ML VIAL (ML) INJECTION at 07:01:00

## 2024-02-23 RX ADMIN — CEFAZOLIN SODIUM/WATER 2 G: 2 G/20 ML SYRINGE (ML) INTRAVENOUS at 07:01:00

## 2024-02-23 RX ADMIN — ONDANSETRON 4 MG: 2 INJECTION INTRAMUSCULAR; INTRAVENOUS at 07:01:00

## 2024-02-23 RX ADMIN — LIDOCAINE HYDROCHLORIDE 50 MG: 10 INJECTION, SOLUTION EPIDURAL; INFILTRATION; INTRACAUDAL; PERINEURAL at 07:01:00

## 2024-02-23 RX ADMIN — SODIUM CHLORIDE, SODIUM LACTATE, POTASSIUM CHLORIDE, CALCIUM CHLORIDE: 600; 310; 30; 20 INJECTION, SOLUTION INTRAVENOUS at 07:00:00

## 2024-02-23 RX ADMIN — METRONIDAZOLE 500 MG: 500 INJECTION, SOLUTION INTRAVENOUS at 07:01:00

## 2024-02-23 RX ADMIN — PHENYLEPHRINE HCL 100 MCG: 10 MG/ML VIAL (ML) INJECTION at 07:51:00

## 2024-02-23 RX ADMIN — MIDAZOLAM HYDROCHLORIDE 2 MG: 1 INJECTION INTRAMUSCULAR; INTRAVENOUS at 07:01:00

## 2024-02-23 NOTE — ANESTHESIA PREPROCEDURE EVALUATION
PRE-OP EVALUATION    Patient Name: Arnulfo Clark    Admit Diagnosis: Anal fistula [K60.3]    Pre-op Diagnosis: Anal fistula [K60.3]    ANAL EXAMINATION UNDER ANESTHESIA, POSSIBLE FISTULOTOMY, POSSIBLE SETON PLACEMENT, POSSIBLE DRAINAGE OF PERIANAL ABSCESS    Anesthesia Procedure: ANAL EXAMINATION UNDER ANESTHESIA, POSSIBLE FISTULOTOMY, POSSIBLE SETON PLACEMENT, POSSIBLE DRAINAGE OF PERIANAL ABSCESS (Anus)    Surgeon(s) and Role:     * Herman Jiménez MD - Primary    Pre-op vitals reviewed.  Temp: 98.4 °F (36.9 °C)  Pulse: 59  Resp: 16  BP: 136/83  SpO2: 95 %  Body mass index is 28.75 kg/m².    Current medications reviewed.  Hospital Medications:   acetaminophen (Tylenol Extra Strength) tab 1,000 mg  1,000 mg Oral Once    scopolamine (Transderm-Scop) 1 MG/3DAYS patch 1 patch  1 patch Transdermal Once    lactated ringers infusion   Intravenous Continuous    ceFAZolin (Ancef) 2 g in 20mL IV syringe premix  2 g Intravenous Once    And    metRONIDAZOLE in sodium chloride 0.79% (Flagyl) 5 mg/mL IVPB premix 500 mg  500 mg Intravenous Once       Outpatient Medications:     Medications Prior to Admission   Medication Sig Dispense Refill Last Dose    metoprolol tartrate 25 MG Oral Tab Take 1 tablet (25 mg total) by mouth as needed. TAKEN BID BUT HELD FOR 2 DAYS DUE TO SIDE EFFECTS SLEEPYNESS. TIREDNESS,LAST DOSE 2/10/24   2/22/2024 at 1900    latanoprost 0.005 % Ophthalmic Solution Place 1 drop into both eyes nightly. Instill 1 drop by ophthalmic route every night into both eyes 7.5 mL 3     rosuvastatin 40 MG Oral Tab Take 1 tablet (40 mg total) by mouth nightly. 30 tablet 3 2/22/2024 at 1900    aspirin 81 MG Oral Tab EC Take 1 tablet (81 mg total) by mouth daily. 30 tablet 3 2/21/2024 at 1900       Allergies: Calamari oil [squid oil], Grass, and Ragweed      Anesthesia Evaluation        Anesthetic Complications  (+) history of anesthetic complications  History of: PONV       GI/Hepatic/Renal      (+) GERD                            Cardiovascular        Exercise tolerance: good     MET: >4      (+) hypertension and well controlled    (+) CAD                                Endo/Other                                  Pulmonary                    (+) sleep apnea and noncompliant      Neuro/Psych                                      Past Surgical History:   Procedure Laterality Date    CABG  09/2023    COLONOSCOPY N/A 09/15/2020    Procedure: COLONOSCOPY;  Surgeon: Bhavesh Beltran MD;  Location: [facility] ENDOSCOPY    COLONOSCOPY      COLONOSCOPY N/A 01/16/2024    Dr. Beltran; Colon polyps    COLONOSCOPY N/A 01/16/2024    Procedure: COLONOSCOPY;  Surgeon: Bhavesh Beltran MD;  Location: [facility] ENDOSCOPY    ELECTROCARDIOGRAM, COMPLETE  02/25/2014    Scanned to media tab     REMOVAL ANAL FISTULA,SUBCUTANEOUS      UPPER GI ENDOSCOPY,EXAM       Social History     Socioeconomic History    Marital status:    Tobacco Use    Smoking status: Never    Smokeless tobacco: Never   Vaping Use    Vaping Use: Never used   Substance and Sexual Activity    Alcohol use: Yes     Comment: daily glass of wine    Drug use: No   Other Topics Concern    Caffeine Concern Yes     Comment: Daily; coffee, 1 cup     Reaction to local anesthetic No     History   Drug Use No     Available pre-op labs reviewed.     Lab Results   Component Value Date     02/13/2024    K 3.7 02/13/2024     02/13/2024    CO2 28.0 02/13/2024    BUN 12 02/13/2024    CREATSERUM 0.87 02/13/2024     (H) 02/13/2024    CA 9.3 02/13/2024            Airway      Mallampati: III  Mouth opening: >3 FB  TM distance: 4 - 6 cm  Neck ROM: full Cardiovascular    Cardiovascular exam normal.         Dental    Dentition appears grossly intact         Pulmonary    Pulmonary exam normal.                 Other findings              ASA: 2   Plan: general  NPO status verified and patient meets guidelines.  Patient has taken beta blockers in last 24 hours.      Comment:  PONV, dental/oral injury, corneal abrasion, postoperative pain, allergic reaction, death, aspiration    Plan/risks discussed with: patient and spouse                Present on Admission:  **None**

## 2024-02-23 NOTE — DISCHARGE INSTRUCTIONS
Anal Surgery  Post-Surgical Instructions     Herman Jiménez MD         MEDICATIONS  You will be given a prescription for pain.  Take 1 tablet every 6 hours as needed.  Pain medications will ease your pain, but you should expect some incisional pain for about 7-10 days.  You may take acetaminophen (Tylenol) up to 650 mg every 6 hours as needed for mild-moderate pain. You may take ibuprofen (Motrin) up to 600 mg every 6 hours as needed for mild-moderate pain. Take narcotic pain medication as needed for severe pain per your prescription. Do not drive while taking narcotic pain medication. Many patients take a stool softener as narcotics are known to cause constipation. You should walk often, cough and take deep breaths.       DIET  You will begin a high fiber diet.  The easiest way to a high fiber diet is to take a fiber supplement.  An excellent supplement is plain, unflavored Metamucil.  You should take one tablespoon in 8 oz of water twice a day.  Ideally, you should take the supplement before breakfast and dinner.  You may experience some gas bloating for the first 2 weeks. This is normal and will go away as long as you keep taking the supplement.  Other supplements that can be taken include Per Courtney, Benefiber, Konsyl, or Citrucel. Continue to take the fiber supplement for 1 month.     In addition, it is a good idea go to the drugstore and a stool softener, such as docusate, and a gentle laxative such as MiraLAX.  Taking MiraLAX daily and stool softener 1-2 times a day may prevent you from getting overly constipated while on the narcotic drug.  Once you stop taking the prescription pain medication, you may stop taking the stool softener and laxative.        WOUND CARE   You will perform sitz baths two-three times a day for 3-4 weeks.  Sitz baths simply mean soaking your anus in a tub of warm-hot water for about 15 minutes.  Sitz baths will clean the anal wound as well as relax the anal sphincter muscles, which  will help minimize your pain.  Be careful around the anal wound, especially if you have stitches on the outside.  Gently pat your anus dry (never rub) or simply dry your anus with a blow-dryer set to cool/warm.   Do not soak your anus for more than 15 minutes.        ACTIVITY  After surgery, your driving reflexes will be slower, especially if you are taking pain medications.  Therefore, you are restricted from driving until after your follow-up visit and after you have stopped taking your prescription pain meds.  You may walk about the house, go shopping, or eat at a restaurant.  You may also climb stairs, but no weight lifting, power-walking, jogging, or using the “Stair-Master”.  You can sit on your wound, but keep in mind that the less you sit, the less pain you will have.     APPOINTMENT  Please call our office for an appointment in 2-4 weeks after surgery, unless otherwise instructed.  This will allow ample time for the swelling and soreness to resolve before your wound is examined.  There may be some bleeding from your wound.  This is normal.  If you begin bleeding heavily, have fevers, chills, or if you are concerned about your wound, please call us immediately at [phone].     Thank you for entrusting us with your care.
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
## 2024-02-23 NOTE — ANESTHESIA POSTPROCEDURE EVALUATION
[facility]    Arnulfo Clark Patient Status:  Hospital Outpatient Surgery   Age/Gender 58 year old male MRN [id number]   Location [facility] POST ANESTHESIA CARE UNIT Attending Herman Jiménez MD   Hosp Day # 0 PCP Cole Cary MD       Anesthesia Post-op Note    ANAL EXAMINATION UNDER ANESTHESIA, PARTIAL FISTULOTOMY WITH SETON PLACEMENT,    Procedure Summary       Date: 02/23/24 Room / Location:  MAIN OR 04 /  MAIN OR    Anesthesia Start: 0659 Anesthesia Stop: 0816    Procedure: ANAL EXAMINATION UNDER ANESTHESIA, PARTIAL FISTULOTOMY WITH SETON PLACEMENT, (Anus) Diagnosis:       Anal fistula      (Anal fistula [K60.3])    Surgeons: Herman Jiménez MD Anesthesiologist: Serge Clarke DO    Anesthesia Type: general ASA Status: 2            Anesthesia Type: general    Vitals Value Taken Time   /69 02/23/24 0906   Temp 97.1 °F (36.2 °C) 02/23/24 0906   Pulse 62 02/23/24 0908   Resp 9 02/23/24 0908   SpO2 98 % 02/23/24 0908   Vitals shown include unfiled device data.    Patient Location: PACU    Anesthesia Type: general    Airway Patency: patent and extubated    Postop Pain Control: adequate    Mental Status: mildly sedated but able to meaningfully participate in the post-anesthesia evaluation    Nausea/Vomiting: none    Cardiopulmonary/Hydration status: stable euvolemic    Complications: no apparent anesthesia related complications    Postop vital signs: stable    Dental Exam: Unchanged from Preop    Patient to be discharged from PACU when criteria met.

## 2024-02-23 NOTE — ANESTHESIA PROCEDURE NOTES
Airway  Date/Time: 2/23/2024 7:04 AM  Urgency: elective      General Information and Staff    Patient location during procedure: OR  Anesthesiologist: Serge Clarke DO  Performed: anesthesiologist   Performed by: Serge Clarke DO  Authorized by: Serge Clarke DO      Indications and Patient Condition  Indications for airway management: anesthesia  Sedation level: deep  Preoxygenated: yes  Patient position: sniffing  Mask difficulty assessment: 0 - not attempted    Final Airway Details  Final airway type: supraglottic airway      Successful airway: classic  Size 3       Number of attempts at approach: 1

## 2024-02-26 ENCOUNTER — APPOINTMENT (OUTPATIENT)
Dept: CARDIAC REHAB | Facility: HOSPITAL | Age: 59
End: 2024-02-26
Attending: INTERNAL MEDICINE
Payer: MEDICAID

## 2024-02-27 ENCOUNTER — TELEMEDICINE (OUTPATIENT)
Dept: INTERNAL MEDICINE CLINIC | Facility: CLINIC | Age: 59
End: 2024-02-27
Payer: MEDICAID

## 2024-02-27 ENCOUNTER — NURSE TRIAGE (OUTPATIENT)
Dept: INTERNAL MEDICINE CLINIC | Facility: CLINIC | Age: 59
End: 2024-02-27

## 2024-02-27 DIAGNOSIS — U07.1 COVID-19: Primary | ICD-10-CM

## 2024-02-27 LAB — AMB EXT COVID-19 RESULT: DETECTED

## 2024-02-27 PROCEDURE — 99213 OFFICE O/P EST LOW 20 MIN: CPT | Performed by: NURSE PRACTITIONER

## 2024-02-27 NOTE — PROGRESS NOTES
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 recommend repeat testing after a positive test.    For Those in Close Contact with Someone with COVID-19  Anyone who has been in close contact with someone who has COVID-19 should follow CDC guidelines for quarantine (below).     What counts as close contact?    * You were within six feet of someone who has COVID-19 for at least 15 minutes.  * You provided care at home to someone who is sick with COVID-19.  * You had direct physical contact with the person (touched, hugged, or kissed them).  * You shared eating or drinking utensils.  * They sneezed, coughed, or somehow got respiratory droplets on you.    CDC Guidelines: If you were exposed to someone with COVID-19 (Quarantine)   If you:  Have been boosted  OR  Completed the primary series of Pfizer or Moderna vaccine within the last six months  OR   Completed the primary series of J&J vaccine within the last two months   Wear a mask around others for 10 days    Test on day five, if possible    If you develop symptoms get a test and stay home.   If you:  Completed the primary series of Pfizer or Moderna vaccine over six months ago and are not boosted  OR  Completed the primary series of J&J over two months ago and are not boosted  OR   Are unvaccinated   Stay home for five days. Wear a mask  around others for five additional days    Test on day five if possible    If you develop symptoms get a test and stay home.   Additional Information      You can also get more information at the following websites:   Centers for Disease Control & Prevention (CDC)  What to do if you are sick with coronavirus disease 2019, https://www.cdc.gov/coronavirus/2019-ncov/downloads/sick-with-2019-nCoV-fact-sheet.pdf  Centers for Disease Control & Prevention (CDC)  10 things you can do to manage your health at home, https://www.cdc.gov/coronavirus/2019-ncov/downloads/10Things.pdf  http://www.[facility]/topics-services/diseases-and-conditions/diseases-a-z-list/coronavirus/symptoms-treatment  http://www.[facility]/topics-services/diseases-and-conditions/diseases-a-z-list/coronavirus  https://www.cdc.gov/coronavirus/2019-ncov/               Relevant Medications    nirmatrelvir-ritonavir 300-100 MG Oral Tablet Therapy Pack          No orders of the defined types were placed in this encounter.      Meds This Visit:  Requested Prescriptions     Signed Prescriptions Disp Refills    nirmatrelvir-ritonavir 300-100 MG Oral Tablet Therapy Pack 30 tablet 0     Sig: Take two nirmatrelvir tablets (300mg) with one ritonavir tablet (100mg) together twice daily for 5 days.       Imaging & Referrals:  None         DAMIEN Johns

## 2024-02-27 NOTE — ASSESSMENT & PLAN NOTE
COVID-19 positive    Requesting paxlovid    Plan  Hold cholesterol medication  Hold metoprolol- He will monitor his B/P and HR- If they increase he can take his metoprolol    Drink Gatorade    COVID-19 Guidelines        [facility] is committed to the safety and well-being of our patients, members, employees, and communities. As the COVID-19 pandemic continues to evolve, [facility] is here to provide community members with reliable answers to any questions they may have.     Please review this entire document. It includes information related to exposure, pending tests, positive results and aftercare.     Reliable, evidence-based treatment options for COVID-19 are extremely limited. People with COVID-19 should receive supportive care to help relieve symptoms.       Patients with pending COVID-19 test results should follow all care and home isolation instructions. An [facility] representative will call with your test results. Results will also be available on [facility].     Home Isolation  If you have tested positive for COVID-19, you should remain under home isolation and follow the below guidelines:  Stay home for five days.  If you have no symptoms or your symptoms are resolving after five days, you can leave your house.  Continue to wear a mask around others for five additional days.  If you have a fever, continue to stay home until your fever resolves.  If you have a fever with cough or shortness of breath but have not been exposed to someone with COVID-19 and have not tested positive for COVID-19, you should also stay home and away from others for at least 24 hours after your fever is gone and symptoms are improving.    Talk to Your Healthcare Provider    If you test positive for COVID-19, notify your healthcare provider to discuss potential treatment options. Patients at high-risk for severe illness due to advanced age or chronic health conditions may be candidates for  outpatient antiviral treatment or monoclonal antibody therapy. These treatments, when available and appropriate, should be given as soon as possible after diagnosis.      Seek Further Care      If you are awaiting test results or are confirmed positive for COVID-19, and your symptoms worsen at home with symptoms such as: extreme weakness, difficult breathing or unrelenting fevers greater than 100.4° F, you should contact your healthcare provider or the emergency department before arriving for care. Measures to keep everyone safe in this difficult time are changing frequently. Your healthcare provider can help direct you on next steps.      If you have not been exposed or are not aware of an exposure to COVID-19 and are concerned about your symptoms, please contact your healthcare provider with any questions.     10 Ways to Manage Your Health at Home       Stay home from work, school, and other public places. If you must go out, avoid using any kind of public transportation, ridesharing, or taxis.   Carefully monitor your symptoms. If your symptoms get worse, call your healthcare provider immediately.  Get rest and stay hydrated.   If you have a medical appointment, call the healthcare provider ahead of time and tell them that you have or may have COVID-19.  For medical emergencies, call 911 and notify the dispatch personnel that you have or may have COVID-19.   Cover your cough and sneezes.   Wash your hands often with soap and water for at least 20 seconds, or clean your hands with an alcohol-based hand  that contains at least 60% alcohol.   As much as possible, stay in a specific room and away from other people in your home. You should use a separate bathroom, if available. If you need to be around other people in or outside of the home, wear a facemask.   Avoid sharing personal items with other people in your household, like dishes, towels, and bedding.   Clean all surfaces that are touched often, like  counters, tabletops, and doorknobs. Use household cleaning sprays or wipes according to the label instructions.         Post-Discharge Follow-up  Please call your primary care provider within two days of your discharge to arrange for a telehealth follow-up. CDC does not recommend repeat testing after a positive test.    For Those in Close Contact with Someone with COVID-19  Anyone who has been in close contact with someone who has COVID-19 should follow CDC guidelines for quarantine (below).     What counts as close contact?    * You were within six feet of someone who has COVID-19 for at least 15 minutes.  * You provided care at home to someone who is sick with COVID-19.  * You had direct physical contact with the person (touched, hugged, or kissed them).  * You shared eating or drinking utensils.  * They sneezed, coughed, or somehow got respiratory droplets on you.    CDC Guidelines: If you were exposed to someone with COVID-19 (Quarantine)   If you:  Have been boosted  OR  Completed the primary series of Pfizer or Moderna vaccine within the last six months  OR   Completed the primary series of J&J vaccine within the last two months   Wear a mask around others for 10 days    Test on day five, if possible    If you develop symptoms get a test and stay home.   If you:  Completed the primary series of Pfizer or Moderna vaccine over six months ago and are not boosted  OR  Completed the primary series of J&J over two months ago and are not boosted  OR   Are unvaccinated   Stay home for five days. Wear a mask around others for five additional days    Test on day five if possible    If you develop symptoms get a test and stay home.     Additional Information      You can also get more information at the following websites:   Centers for Disease Control & Prevention (CDC)  What to do if you are sick with coronavirus disease 2019,  https://www.cdc.gov/coronavirus/2019-ncov/downloads/sick-with-2019-nCoV-fact-sheet.pdf  Centers for Disease Control & Prevention (CDC)  10 things you can do to manage your health at home, https://www.cdc.gov/coronavirus/2019-ncov/downloads/10Things.pdf  http://www.[facility]/topics-services/diseases-and-conditions/diseases-a-z-list/coronavirus/symptoms-treatment  http://www.[facility].[place]/topics-services/diseases-and-conditions/diseases-a-z-list/coronavirus  https://www.cdc.gov/coronavirus/2019-ncov/

## 2024-02-27 NOTE — TELEPHONE ENCOUNTER
Patient called back and said pharmacy did not get prescription. RN called pharmacy and they state they have the prescription and are working on it. Patient notified, states \"they should have had it ready at 10:45. Im not going to fill my prescriptions here anymore.\"

## 2024-02-27 NOTE — TELEPHONE ENCOUNTER
Action Requested: Summary for Provider     []  Critical Lab, Recommendations Needed  [] Need Additional Advice  []   FYI    []   Need Orders  [] Need Medications Sent to Pharmacy  []  Other     SUMMARY: Per protocol disposition advised virtual appointment, patient is interested in Paxlovid     Future Appointments   Date Time Provider Department Center   2/27/2024 10:00 AM [facility]   2/29/2024  1:00 PM Herman Jiménez MD [id number]   5/7/2024  9:30 AM Dalton Samson MD [facility]       Patient scheduled for a video visit.  Patient advised to complete the E-check in [employer], if active.  Understands to follow the prompts and links to complete the visit.    Patient advised that there may be a co-pay involved with this type of visit.     Patient agreed to proceed, they understand the provider may be calling from a blocked, or unknown phone number on their caller ID and they know to answer the phone.    Reviewed emergency symptoms and when patient should be seen sooner in ER/ICC.     Per patient aware of Quarantine and isolation recommendations.     Infection banner updated.    Best call back:  [phone]     Reason for call: Infection (COVID)  Onset: 2 days     Per patient he believes he has COVID, symptoms started 2 days ago and include: sore throat, nasal burning, dry cough and mild headache. Afebrile. Denies chest pain and shortness of breath. Positive home COVID test. Denies other symptoms marked no under protocol.     Reason for Disposition   [1] HIGH RISK for severe COVID complications (e.g., weak immune system, age > 64 years, obesity with BMI of 30 or higher, pregnant, chronic lung disease or other chronic medical condition) AND [2] COVID symptoms (e.g., cough, fever)  (Exceptions: Already seen by PCP and no new or worsening symptoms.)    Protocols used: Coronavirus (COVID-19) Diagnosed or Nctntcnew-A-UY

## 2024-02-28 ENCOUNTER — APPOINTMENT (OUTPATIENT)
Dept: CARDIAC REHAB | Facility: HOSPITAL | Age: 59
End: 2024-02-28
Attending: INTERNAL MEDICINE
Payer: MEDICAID

## 2024-02-29 ENCOUNTER — APPOINTMENT (OUTPATIENT)
Dept: CARDIAC REHAB | Facility: HOSPITAL | Age: 59
End: 2024-02-29
Attending: INTERNAL MEDICINE
Payer: MEDICAID

## 2024-02-29 ENCOUNTER — TELEPHONE (OUTPATIENT)
Facility: LOCATION | Age: 59
End: 2024-02-29

## 2024-02-29 DIAGNOSIS — G89.18 POSTOPERATIVE PAIN: Primary | ICD-10-CM

## 2024-02-29 RX ORDER — HYDROCODONE BITARTRATE AND ACETAMINOPHEN 5; 325 MG/1; MG/1
1 TABLET ORAL EVERY 6 HOURS PRN
Qty: 10 TABLET | Refills: 0 | Status: SHIPPED | OUTPATIENT
Start: 2024-02-29

## 2024-03-11 ENCOUNTER — OFFICE VISIT (OUTPATIENT)
Facility: LOCATION | Age: 59
End: 2024-03-11
Payer: MEDICAID

## 2024-03-11 VITALS — TEMPERATURE: 98 F | HEART RATE: 96 BPM

## 2024-03-11 DIAGNOSIS — Z09 POSTOP CHECK: Primary | ICD-10-CM

## 2024-03-11 DIAGNOSIS — K60.3 ANAL FISTULA: ICD-10-CM

## 2024-03-11 PROCEDURE — 99024 POSTOP FOLLOW-UP VISIT: CPT | Performed by: STUDENT IN AN ORGANIZED HEALTH CARE EDUCATION/TRAINING PROGRAM

## 2024-03-14 NOTE — PROGRESS NOTES
Follow Up Visit Note       Active Problems      1. Postop check    2. Anal fistula          Chief Complaint   Chief Complaint   Patient presents with    Post-Op     PO- ANAL EXAMINATION UNDER ANESTHESIA, PARTIAL FISTULOTOMY WITH SETON PLACEMENT   2/23 JJS (requests to see doctor) - Pt c/o yellow drainage, Pt denies any f/n/v            History of Present Illness  This is a very nice 58-year-old gentleman who presents to clinic for postop check following anal exam under anesthesia, partial fistulotomy, and seton placement with Dr. Jiménez on 2/23/2024.    Patient states he is overall recovering well.  He does endorse some discomfort related to seton.  He is taking ibuprofen throughout the day, though still requiring Norco at night.  He reports some yellow drainage from incision.  He is doing his daily sitz bath's.  Reports normal bowel movements.  Denies any fevers or chills.  He denies any incontinence.      Allergies  Arnulfo is allergic to calamari oil [squid oil], grass, and ragweed.    Past Medical / Surgical / Social / Family History    The past medical and past surgical history have been reviewed by me today.    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    Pigmentary glaucoma of both eyes, moderate stage 02/09/2016 1/30/2020- pt had been lost to follow up since 2016 and came in with IOP of 27 OU and abnormal VF and OCT OS- and was restarted on Latanoprost OS and started Latanoprost OD due to OHT    PONV (postoperative nausea and vomiting)     Sleep apnea     LAST CPAP USE  2 YRS AGO    Visual impairment     readers     Past Surgical History:   Procedure Laterality Date    CABG  09/2023    COLONOSCOPY N/A 09/15/2020    Procedure: COLONOSCOPY;  Surgeon: Bhavesh Beltran MD;  Location: [facility] ENDOSCOPY    COLONOSCOPY      COLONOSCOPY N/A 01/16/2024    Dr. Beltran; Colon polyps    COLONOSCOPY N/A 01/16/2024    Procedure: COLONOSCOPY;  Surgeon: Bhavesh Beltran MD;  Location: [facility] ENDOSCOPY    ELECTROCARDIOGRAM, COMPLETE  02/25/2014    Scanned to media tab     REMOVAL ANAL FISTULA,SUBCUTANEOUS      UPPER GI ENDOSCOPY,EXAM         The family history and social history have been reviewed by me today.    Family History   Problem Relation Age of Onset    Heart Disease Mother     Heart Disease Maternal Grandmother     Diabetes Neg     Glaucoma Neg     Macular degeneration Neg      Social History     Socioeconomic History    Marital status:    Tobacco Use    Smoking status: Never    Smokeless tobacco: Never   Vaping Use    Vaping Use: Never used   Substance and Sexual Activity    Alcohol use: Yes     Comment: daily glass of wine    Drug use: No   Other Topics Concern    Caffeine Concern Yes     Comment: Daily; coffee, 1 cup     Reaction to local anesthetic No        Current Outpatient Medications:     HYDROcodone-acetaminophen (NORCO) 5-325 MG Oral Tab, Take 1 tablet by mouth every 6 (six) hours as needed for Pain., Disp: 10 tablet, Rfl: 0    HYDROcodone-acetaminophen 5-325 MG Oral Tab, Take 1-2 tablets by mouth every 6 (six) hours as needed for Pain., Disp: 6 tablet, Rfl: 0    metoprolol tartrate 25 MG Oral Tab, Take 1 tablet (25 mg total) by mouth as needed. TAKEN BID BUT HELD FOR 2 DAYS DUE TO SIDE EFFECTS SLEEPYNESS. TIREDNESS,LAST DOSE 2/10/24, Disp: , Rfl:     latanoprost 0.005 % Ophthalmic Solution, Place 1 drop into both eyes nightly. Instill 1 drop by ophthalmic route every night into both eyes, Disp: 7.5 mL, Rfl: 3    rosuvastatin 40 MG Oral Tab, Take 1 tablet (40 mg  total) by mouth nightly., Disp: 30 tablet, Rfl: 3    aspirin 81 MG Oral Tab EC, Take 1 tablet (81 mg total) by mouth daily., Disp: 30 tablet, Rfl: 3     Review of Systems  A 10 point review of systems was performed and negative unless otherwise documented per HPI.     Physical Findings   Pulse 96   Temp 97.9 °F (36.6 °C)   Physical Exam  Vitals and nursing note reviewed. Exam conducted with a chaperone present.   Constitutional:       General: He is not in acute distress.  HENT:      Head: Normocephalic and atraumatic.      Mouth/Throat:      Mouth: Mucous membranes are moist.   Cardiovascular:      Rate and Rhythm: Normal rate and regular rhythm.   Pulmonary:      Effort: Pulmonary effort is normal.   Abdominal:      General: There is no distension.      Palpations: Abdomen is soft.      Tenderness: There is no abdominal tenderness.   Genitourinary:     Comments: Patient examined in the prone jackknife position.  External exam of the anus reveals an external opening around 3-4 cm from the anal verge with vessel loop seton in place.  The knotted portion of the seton were lodged in the external opening.  The knotted portion of the seton was removed from the wound.  The wound was filled with granulation tissue and there was a small amount of purulent drainage.  Digital rectal exam and anoscopy deferred.  Musculoskeletal:         General: No deformity.   Skin:     General: Skin is warm and dry.   Neurological:      General: No focal deficit present.      Mental Status: He is alert.   Psychiatric:         Mood and Affect: Mood normal.          Assessment   1. Postop check    2. Anal fistula      This is a very nice 58-year-old gentleman who presents to clinic for postop check following anal exam under anesthesia, partial fistulotomy, and seton placement with Dr. Jiménez on 2/23/2024.    Patient states he is overall recovering well.  He does endorse some discomfort related to seton.  He is taking ibuprofen throughout the  day, though still requiring Norco at night.  He reports some yellow drainage from incision.  He is doing his daily sitz bath's.  Reports normal bowel movements.  Denies any fevers or chills.  He denies any incontinence.    External exam of the anus reveals an external opening around 3-4 cm from the anal verge with vessel loop seton in place.  The knotted portion of the seton were lodged in the external opening.  The knotted portion of the seton was removed from the wound.  The wound was filled with granulation tissue and there was a small amount of purulent drainage.  Digital rectal exam and anoscopy deferred.    Plan   I recommend planning for repeat anal exam under anesthesia with anoscopy with most likely repair of the anal fistula via creation of a rectal advancement flap, possible fistulotomy, possible seton replacement.  The details of the surgery were discussed including the expected recovery time, risk, benefits and alternatives.  I counseled patient that I fear his fistula has significant overlying sphincter involvement and therefore, he would not be a good candidate for fistulotomy as there would be too high risk for postoperative fecal incontinence.  Unfortunately, sphincter-sparing repair such as rectal advancement flap have lower healing rates compared to fistulotomy.  Other risks include but not limited to pain, bleeding, infection and change in continence.  Patient expressed understanding and was agreeable to schedule surgery with me.    In the meantime, I recommend ongoing local wound care with sitz bath's and dry gauze dressing changes.  Patient can take over-the-counter pain medication as needed.     No orders of the defined types were placed in this encounter.      Imaging & Referrals   None    Follow Up  Return if symptoms worsen or fail to improve.    Herman Jiménez MD

## 2024-04-03 ENCOUNTER — NURSE TRIAGE (OUTPATIENT)
Dept: INTERNAL MEDICINE CLINIC | Facility: CLINIC | Age: 59
End: 2024-04-03

## 2024-04-03 ENCOUNTER — TELEMEDICINE (OUTPATIENT)
Dept: INTERNAL MEDICINE CLINIC | Facility: CLINIC | Age: 59
End: 2024-04-03
Payer: MEDICAID

## 2024-04-03 DIAGNOSIS — H00.011 HORDEOLUM EXTERNUM OF RIGHT UPPER EYELID: Primary | ICD-10-CM

## 2024-04-03 PROCEDURE — 99213 OFFICE O/P EST LOW 20 MIN: CPT | Performed by: INTERNAL MEDICINE

## 2024-04-03 NOTE — TELEPHONE ENCOUNTER
Action Requested: Summary for Provider     []  Critical Lab, Recommendations Needed  [] Need Additional Advice  []   FYI    []   Need Orders  [] Need Medications Sent to Pharmacy  []  Other     SUMMARY: Per protocol, patient should be seen in office today. Scheduled video visit.     Future Appointments   Date Time Provider Department Center   4/3/2024  4:00 PM Cole Cary MD [facility]   5/7/2024  9:30 AM [facility]       Reason for call: Eye Problem (/)  Onset: Data Unavailable    Spoke to patient. He woke up with right eye redness and swelling. His eye is irritated and uncomfortable. It is not draining.     He dealt with someone last week who had bilateral eye redness and he thinks he caught whatever she has.     Patient was agitated that only video visit was available. He does not understand how the doctor is going to be able to assist him. RN tried to reassure him that they doctors do a lot of telehealth and are able to accomplish a lot on a video visit. Also relayed he can go to a [facility] or  if he would like to be seen in person. He declined. He also declined appointment tomorrow.     Reason for Disposition   Patient wants to be seen    Protocols used: Eye - Red Without Pus-A-[place]

## 2024-04-03 NOTE — PROGRESS NOTES
Patient ID: Arnulfo Clark is a 58 year old male.  Chief Complaint   Patient presents with    Eye Visual Problem          HISTORY OF PRESENT ILLNESS:   Patient presents for above.  This visit is conducted using Telemedicine with live, interactive video and audio.  Over the past 24 hours patient has developed a growth on his right upper eyelid.  He was exposed to somebody who had a stye.  States his friend went to the immediate care with a stye and was given antibiotics and now he wants that as well.  Denies fevers, chills, purulent discharge, visual changes.    Review of Systems   Constitutional: Negative.    HENT: Negative.     Eyes: Negative.    Respiratory: Negative.     Cardiovascular: Negative.    Gastrointestinal: Negative.    Endocrine: Negative.    Genitourinary: Negative.    Musculoskeletal: Negative.    Skin: Negative.    Allergic/Immunologic: Negative.    Neurological: Negative.    Hematological: Negative.    Psychiatric/Behavioral: Negative.        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    Pigmentary glaucoma of both eyes, moderate stage 02/09/2016 1/30/2020- pt had been lost to follow up since 2016 and came in with IOP of 27 OU and abnormal VF and OCT OS- and was restarted on Latanoprost OS and started Latanoprost OD due to OHT    PONV (postoperative nausea and vomiting)     Sleep apnea     LAST CPAP USE 2 YRS AGO    Visual impairment     readers       Past Surgical History:   Procedure Laterality Date    CABG  09/2023    COLONOSCOPY  N/A 09/15/2020    Procedure: COLONOSCOPY;  Surgeon: Bhavesh Beltran MD;  Location: [facility] ENDOSCOPY    COLONOSCOPY      COLONOSCOPY N/A 01/16/2024    Dr. Beltran; Colon polyps    COLONOSCOPY N/A 01/16/2024    Procedure: COLONOSCOPY;  Surgeon: Bhavesh Beltran MD;  Location: [facility] ENDOSCOPY    ELECTROCARDIOGRAM, COMPLETE  02/25/2014    Scanned to media tab     REMOVAL ANAL FISTULA,SUBCUTANEOUS      UPPER GI ENDOSCOPY,EXAM           Current Outpatient Medications:     metoprolol tartrate 25 MG Oral Tab, Take 1 tablet (25 mg total) by mouth as needed. TAKEN BID BUT HELD FOR 2 DAYS DUE TO SIDE EFFECTS SLEEPYNESS. TIREDNESS,LAST DOSE 2/10/24, Disp: , Rfl:     latanoprost 0.005 % Ophthalmic Solution, Place 1 drop into both eyes nightly. Instill 1 drop by ophthalmic route every night into both eyes, Disp: 7.5 mL, Rfl: 3    rosuvastatin 40 MG Oral Tab, Take 1 tablet (40 mg total) by mouth nightly., Disp: 30 tablet, Rfl: 3    aspirin 81 MG Oral Tab EC, Take 1 tablet (81 mg total) by mouth daily., Disp: 30 tablet, Rfl: 3    Allergies:  Allergies   Allergen Reactions    Calamari Oil [Squid Oil] ITCHING and SWELLING    Grass Runny nose    Ragweed ITCHING       Social History     Socioeconomic History    Marital status:      Spouse name: Not on file    Number of children: Not on file    Years of education: Not on file    Highest education level: Not on file   Occupational History    Not on file   Tobacco Use    Smoking status: Never    Smokeless tobacco: Never   Vaping Use    Vaping Use: Never used   Substance and Sexual Activity    Alcohol use: Yes     Comment: daily glass of wine    Drug use: No    Sexual activity: Not on file   Other Topics Concern     Service Not Asked    Blood Transfusions Not Asked    Caffeine Concern Yes     Comment: Daily; coffee, 1 cup     Occupational Exposure Not Asked    Hobby Hazards Not Asked    Sleep Concern Not Asked    Stress Concern Not Asked    Weight Concern Not  Asked    Special Diet Not Asked    Back Care Not Asked    Exercise Not Asked    Bike Helmet Not Asked    Seat Belt Not Asked    Self-Exams Not Asked    Grew up on a farm Not Asked    History of tanning Not Asked    Outdoor occupation Not Asked    Pt has a pacemaker Not Asked    Pt has a defibrillator Not Asked    Reaction to local anesthetic No   Social History Narrative    Not on file     Social Determinants of Health     Financial Resource Strain: Low Risk  (9/11/2023)    Financial Resource Strain     Difficulty of Paying Living Expenses: Not very hard     Med Affordability: No   Food Insecurity: Not on file   Transportation Needs: No Transportation Needs (9/11/2023)    Transportation Needs     Lack of Transportation: No   Physical Activity: Not on file   Stress: Not on file   Social Connections: Not on file   Housing Stability: Not on file       PHYSICAL EXAM:   Unable to perform vitals or do physical exam as this is a virtual video visit.  Patient appears alert.  No conversational dyspnea or distress.    ASSESSMENT/PLAN:   1. Hordeolum externum of right upper eyelid  Explained that antibiotics are not needed.  Warm compresses several times a day.  Handout given with home care tips.    Return if symptoms worsen or fail to improve.    Time spent on encounter  11 minutes   Video time 6 minutes   Documentation time 5 minutes     Arnulfo Clark understands video evaluation is not a substitute for face-to-face examination or emergency care. Patient advised to go to ER or call 911 for worsening symptoms or acute distress.     Telehealth outside of [facility]  Telehealth Verbal Consent   I conducted a telehealth visit with Arnulfo Clark today, 04/03/24, which was completed using two-way, real-time interactive audio and video communication. This has been done in good ladan to provide continuity of care in the best interest of the provider-patient relationship, due to the COVID -19 public health crisis/national  emergency where restrictions of face-to-face office visits are ongoing. Every conscious effort was taken to allow for sufficient and adequate time to complete the visit.  The patient was made aware of the limitations of the telehealth visit, including treatment limitations as no physical exam could be performed.  The patient was advised to call 911 or to go to the ER in case there was an emergency.  The patient was also advised of the potential privacy & security concerns related to the telehealth platform.   The patient was made aware of where to find [facility]'s notice of privacy practices, telehealth consent form and other related consent forms and documents.  which are located on the [facility] website. The patient verbally agreed to telehealth consent form, related consents and the risks discussed.    Lastly, the patient confirmed that they were in [place].   Included in this visit, time may have been spent reviewing labs, medications, radiology tests and decision making. Appropriate medical decision-making and tests are ordered as detailed in the plan of care above.  Coding/billing information is submitted for this visit based on complexity of care and/or time spent for the visit.    This note was prepared using Dragon Medical voice recognition dictation software. As a result errors may occur. When identified these errors have been corrected. While every attempt is made to correct errors during dictation discrepancies may still exist.    Cole Cary MD  4/3/2024

## 2024-04-04 ENCOUNTER — TELEPHONE (OUTPATIENT)
Facility: LOCATION | Age: 59
End: 2024-04-04

## 2024-04-19 ENCOUNTER — HOSPITAL ENCOUNTER (OUTPATIENT)
Facility: HOSPITAL | Age: 59
Setting detail: HOSPITAL OUTPATIENT SURGERY
Discharge: HOME | End: 2024-04-19
Attending: STUDENT IN AN ORGANIZED HEALTH CARE EDUCATION/TRAINING PROGRAM | Admitting: STUDENT IN AN ORGANIZED HEALTH CARE EDUCATION/TRAINING PROGRAM
Payer: MEDICAID

## 2024-04-19 ENCOUNTER — ANESTHESIA (OUTPATIENT)
Dept: SURGERY | Facility: HOSPITAL | Age: 59
End: 2024-04-19
Payer: MEDICAID

## 2024-04-19 ENCOUNTER — ANESTHESIA EVENT (OUTPATIENT)
Dept: SURGERY | Facility: HOSPITAL | Age: 59
End: 2024-04-19
Payer: MEDICAID

## 2024-04-19 VITALS
DIASTOLIC BLOOD PRESSURE: 90 MMHG | WEIGHT: 202.63 LBS | HEIGHT: 70 IN | SYSTOLIC BLOOD PRESSURE: 152 MMHG | TEMPERATURE: 97 F | RESPIRATION RATE: 12 BRPM | BODY MASS INDEX: 29.01 KG/M2 | HEART RATE: 74 BPM | OXYGEN SATURATION: 93 %

## 2024-04-19 DIAGNOSIS — K60.3 ANAL FISTULA: ICD-10-CM

## 2024-04-19 PROCEDURE — 46288 REPAIR ANAL FISTULA: CPT | Performed by: STUDENT IN AN ORGANIZED HEALTH CARE EDUCATION/TRAINING PROGRAM

## 2024-04-19 PROCEDURE — 0JXB0ZZ TRANSFER PERINEUM SUBCUTANEOUS TISSUE AND FASCIA, OPEN APPROACH: ICD-10-PCS | Performed by: STUDENT IN AN ORGANIZED HEALTH CARE EDUCATION/TRAINING PROGRAM

## 2024-04-19 RX ORDER — NALOXONE HYDROCHLORIDE 0.4 MG/ML
0.08 INJECTION, SOLUTION INTRAMUSCULAR; INTRAVENOUS; SUBCUTANEOUS AS NEEDED
Status: DISCONTINUED | OUTPATIENT
Start: 2024-04-19 | End: 2024-04-19

## 2024-04-19 RX ORDER — METRONIDAZOLE 500 MG/100ML
500 INJECTION, SOLUTION INTRAVENOUS ONCE
Status: COMPLETED | OUTPATIENT
Start: 2024-04-19 | End: 2024-04-19

## 2024-04-19 RX ORDER — HYDROMORPHONE HYDROCHLORIDE 1 MG/ML
0.4 INJECTION, SOLUTION INTRAMUSCULAR; INTRAVENOUS; SUBCUTANEOUS EVERY 5 MIN PRN
Status: DISCONTINUED | OUTPATIENT
Start: 2024-04-19 | End: 2024-04-19

## 2024-04-19 RX ORDER — HYDROMORPHONE HYDROCHLORIDE 1 MG/ML
0.6 INJECTION, SOLUTION INTRAMUSCULAR; INTRAVENOUS; SUBCUTANEOUS EVERY 5 MIN PRN
Status: DISCONTINUED | OUTPATIENT
Start: 2024-04-19 | End: 2024-04-19

## 2024-04-19 RX ORDER — NICOTINE POLACRILEX 4 MG
30 LOZENGE BUCCAL
Status: DISCONTINUED | OUTPATIENT
Start: 2024-04-19 | End: 2024-04-19

## 2024-04-19 RX ORDER — SCOLOPAMINE TRANSDERMAL SYSTEM 1 MG/1
1 PATCH, EXTENDED RELEASE TRANSDERMAL ONCE
Status: DISCONTINUED | OUTPATIENT
Start: 2024-04-19 | End: 2024-04-19

## 2024-04-19 RX ORDER — SODIUM CHLORIDE, SODIUM LACTATE, POTASSIUM CHLORIDE, CALCIUM CHLORIDE 600; 310; 30; 20 MG/100ML; MG/100ML; MG/100ML; MG/100ML
INJECTION, SOLUTION INTRAVENOUS CONTINUOUS
Status: DISCONTINUED | OUTPATIENT
Start: 2024-04-19 | End: 2024-04-19

## 2024-04-19 RX ORDER — DEXTROSE MONOHYDRATE 25 G/50ML
50 INJECTION, SOLUTION INTRAVENOUS
Status: DISCONTINUED | OUTPATIENT
Start: 2024-04-19 | End: 2024-04-19

## 2024-04-19 RX ORDER — NICOTINE POLACRILEX 4 MG
15 LOZENGE BUCCAL
Status: DISCONTINUED | OUTPATIENT
Start: 2024-04-19 | End: 2024-04-19

## 2024-04-19 RX ORDER — BUPIVACAINE HYDROCHLORIDE AND EPINEPHRINE 5; 5 MG/ML; UG/ML
INJECTION, SOLUTION EPIDURAL; INTRACAUDAL; PERINEURAL AS NEEDED
Status: DISCONTINUED | OUTPATIENT
Start: 2024-04-19 | End: 2024-04-19 | Stop reason: HOSPADM

## 2024-04-19 RX ORDER — ONDANSETRON 2 MG/ML
4 INJECTION INTRAMUSCULAR; INTRAVENOUS ONCE
Status: COMPLETED | OUTPATIENT
Start: 2024-04-19 | End: 2024-04-19

## 2024-04-19 RX ORDER — HYDROCODONE BITARTRATE AND ACETAMINOPHEN 5; 325 MG/1; MG/1
1 TABLET ORAL ONCE
Status: DISCONTINUED | OUTPATIENT
Start: 2024-04-19 | End: 2024-04-19

## 2024-04-19 RX ORDER — HYDROCODONE BITARTRATE AND ACETAMINOPHEN 5; 325 MG/1; MG/1
1 TABLET ORAL EVERY 6 HOURS PRN
Qty: 20 TABLET | Refills: 0 | Status: SHIPPED | OUTPATIENT
Start: 2024-04-19

## 2024-04-19 RX ORDER — HYDROCODONE BITARTRATE AND ACETAMINOPHEN 5; 325 MG/1; MG/1
2 TABLET ORAL ONCE
Status: DISCONTINUED | OUTPATIENT
Start: 2024-04-19 | End: 2024-04-19

## 2024-04-19 RX ORDER — METOCLOPRAMIDE HYDROCHLORIDE 5 MG/ML
INJECTION INTRAMUSCULAR; INTRAVENOUS AS NEEDED
Status: DISCONTINUED | OUTPATIENT
Start: 2024-04-19 | End: 2024-04-19 | Stop reason: SURG

## 2024-04-19 RX ORDER — HYDROMORPHONE HYDROCHLORIDE 1 MG/ML
0.2 INJECTION, SOLUTION INTRAMUSCULAR; INTRAVENOUS; SUBCUTANEOUS EVERY 5 MIN PRN
Status: DISCONTINUED | OUTPATIENT
Start: 2024-04-19 | End: 2024-04-19

## 2024-04-19 RX ORDER — ONDANSETRON 2 MG/ML
INJECTION INTRAMUSCULAR; INTRAVENOUS AS NEEDED
Status: DISCONTINUED | OUTPATIENT
Start: 2024-04-19 | End: 2024-04-19 | Stop reason: SURG

## 2024-04-19 RX ORDER — ROCURONIUM BROMIDE 10 MG/ML
INJECTION, SOLUTION INTRAVENOUS AS NEEDED
Status: DISCONTINUED | OUTPATIENT
Start: 2024-04-19 | End: 2024-04-19 | Stop reason: SURG

## 2024-04-19 RX ORDER — LIDOCAINE HYDROCHLORIDE 10 MG/ML
INJECTION, SOLUTION EPIDURAL; INFILTRATION; INTRACAUDAL; PERINEURAL AS NEEDED
Status: DISCONTINUED | OUTPATIENT
Start: 2024-04-19 | End: 2024-04-19 | Stop reason: SURG

## 2024-04-19 RX ORDER — ACETAMINOPHEN 500 MG
1000 TABLET ORAL ONCE
Status: DISCONTINUED | OUTPATIENT
Start: 2024-04-19 | End: 2024-04-19 | Stop reason: HOSPADM

## 2024-04-19 RX ORDER — DEXAMETHASONE SODIUM PHOSPHATE 4 MG/ML
VIAL (ML) INJECTION AS NEEDED
Status: DISCONTINUED | OUTPATIENT
Start: 2024-04-19 | End: 2024-04-19 | Stop reason: SURG

## 2024-04-19 RX ORDER — CEFAZOLIN SODIUM/WATER 2 G/20 ML
2 SYRINGE (ML) INTRAVENOUS ONCE
Status: COMPLETED | OUTPATIENT
Start: 2024-04-19 | End: 2024-04-19

## 2024-04-19 RX ORDER — HYDROMORPHONE HYDROCHLORIDE 1 MG/ML
INJECTION, SOLUTION INTRAMUSCULAR; INTRAVENOUS; SUBCUTANEOUS
Status: COMPLETED
Start: 2024-04-19 | End: 2024-04-19

## 2024-04-19 RX ORDER — ONDANSETRON 2 MG/ML
INJECTION INTRAMUSCULAR; INTRAVENOUS
Status: COMPLETED
Start: 2024-04-19 | End: 2024-04-19

## 2024-04-19 RX ADMIN — LIDOCAINE HYDROCHLORIDE 50 MG: 10 INJECTION, SOLUTION EPIDURAL; INFILTRATION; INTRACAUDAL; PERINEURAL at 09:47:00

## 2024-04-19 RX ADMIN — SODIUM CHLORIDE, SODIUM LACTATE, POTASSIUM CHLORIDE, CALCIUM CHLORIDE: 600; 310; 30; 20 INJECTION, SOLUTION INTRAVENOUS at 11:24:00

## 2024-04-19 RX ADMIN — METOCLOPRAMIDE HYDROCHLORIDE 5 MG: 5 INJECTION INTRAMUSCULAR; INTRAVENOUS at 09:50:00

## 2024-04-19 RX ADMIN — ONDANSETRON 4 MG: 2 INJECTION INTRAMUSCULAR; INTRAVENOUS at 09:50:00

## 2024-04-19 RX ADMIN — DEXAMETHASONE SODIUM PHOSPHATE 8 MG: 4 MG/ML VIAL (ML) INJECTION at 09:50:00

## 2024-04-19 RX ADMIN — CEFAZOLIN SODIUM/WATER 2 G: 2 G/20 ML SYRINGE (ML) INTRAVENOUS at 09:52:00

## 2024-04-19 RX ADMIN — ROCURONIUM BROMIDE 40 MG: 10 INJECTION, SOLUTION INTRAVENOUS at 09:47:00

## 2024-04-19 RX ADMIN — METRONIDAZOLE 500 MG: 500 INJECTION, SOLUTION INTRAVENOUS at 09:45:00

## 2024-04-19 NOTE — ANESTHESIA PREPROCEDURE EVALUATION
PRE-OP EVALUATION    Patient Name: Arnulfo Clark    Admit Diagnosis: Anal fistula [K60.3]    Pre-op Diagnosis: Anal fistula [K60.3]    ANAL EXAMINATION UNDER ANESTHESIA, CREATION OF RECTAL ADVANCEMENT FLAP    Anesthesia Procedure: ANAL EXAMINATION UNDER ANESTHESIA, CREATION OF RECTAL ADVANCEMENT FLAP    Surgeons and Role:     * Herman Jiménez MD - Primary    Pre-op vitals reviewed.  Temp: 97 °F (36.1 °C)  Pulse: 75  Resp: 16  BP: 154/95  SpO2: 99 %  Body mass index is 29.07 kg/m².    Current medications reviewed.  Hospital Medications:   acetaminophen (Tylenol Extra Strength) tab 1,000 mg  1,000 mg Oral Once    scopolamine (Transderm-Scop) 1 MG/3DAYS patch 1 patch  1 patch Transdermal Once    lactated ringers infusion   Intravenous Continuous    ceFAZolin (Ancef) 2 g in 20mL IV syringe premix  2 g Intravenous Once    And    metRONIDAZOLE in sodium chloride 0.79% (Flagyl) 5 mg/mL IVPB premix 500 mg  500 mg Intravenous Once       Outpatient Medications:     Medications Prior to Admission   Medication Sig Dispense Refill Last Dose    metoprolol tartrate 25 MG Oral Tab Take 1 tablet (25 mg total) by mouth as needed. TAKEN BID BUT HELD FOR 2 DAYS DUE TO SIDE EFFECTS SLEEPYNESS. TIREDNESS,LAST DOSE 2/10/24   not taking    latanoprost 0.005 % Ophthalmic Solution Place 1 drop into both eyes nightly. Instill 1 drop by ophthalmic route every night into both eyes 7.5 mL 3 4/17/2024    rosuvastatin 40 MG Oral Tab Take 1 tablet (40 mg total) by mouth nightly. 30 tablet 3 4/18/2024    aspirin 81 MG Oral Tab EC Take 1 tablet (81 mg total) by mouth daily. 30 tablet 3 4/15/2024       Allergies: Calamari oil [squid oil], Grass, and Ragweed      Anesthesia Evaluation    Patient summary reviewed.    Anesthetic Complications  (+) history of anesthetic complications  History of: PONV       GI/Hepatic/Renal      (+) GERD                           Cardiovascular        Exercise tolerance: good     MET: >4      (+) hypertension and  well controlled    (+) CAD    (+) CABG/stent                            Endo/Other                                  Pulmonary                    (+) sleep apnea and noncompliant      Neuro/Psych                                      Past Surgical History:   Procedure Laterality Date    Cabg  09/2023    Colonoscopy N/A 09/15/2020    Procedure: COLONOSCOPY;  Surgeon: Bhavesh Beltran MD;  Location: [facility] ENDOSCOPY    Colonoscopy      Colonoscopy N/A 01/16/2024    Dr. Beltran; Colon polyps    Colonoscopy N/A 01/16/2024    Procedure: COLONOSCOPY;  Surgeon: Bhavesh Beltran MD;  Location: [facility] ENDOSCOPY    Electrocardiogram, complete  02/25/2014    Scanned to media tab     Removal anal fistula,subcutaneous      Upper gi endoscopy,exam       Social History     Socioeconomic History    Marital status:    Tobacco Use    Smoking status: Never    Smokeless tobacco: Never   Vaping Use    Vaping status: Never Used   Substance and Sexual Activity    Alcohol use: Yes     Comment: daily glass of wine    Drug use: No   Other Topics Concern    Caffeine Concern Yes     Comment: Daily; coffee, 1 cup     Reaction to local anesthetic No     History   Drug Use No     Available pre-op labs reviewed.     Lab Results   Component Value Date     02/13/2024    K 3.7 02/13/2024     02/13/2024    CO2 28.0 02/13/2024    BUN 12 02/13/2024    CREATSERUM 0.87 02/13/2024     (H) 02/13/2024    CA 9.3 02/13/2024            Airway      Mallampati: II  Mouth opening: 3 FB  TM distance: 4 - 6 cm  Neck ROM: full Cardiovascular    Cardiovascular exam normal.  Rhythm: regular  Rate: normal  (-) murmur   Dental             Pulmonary    Pulmonary exam normal.  Breath sounds clear to auscultation bilaterally.               Other findings              ASA: 3   Plan: general  NPO status verified and patient meets guidelines.    Post-procedure pain management plan discussed with surgeon and patient.    Comment: Risk and  benefits of GETA discussed with patient, including but not limited to PONV, complications with intubation, pain management after surgery.  Patient understands and agrees to proceed.    Plan/risks discussed with: patient                Present on Admission:  **None**

## 2024-04-19 NOTE — OPERATIVE REPORT
[facility]  Operative Note    Arnulfo Clark Location: OR   [facility] [id number] MRN [id number]    5/10/1965 Age 58 year old   Admission Date 2024 Operation Date 2024   Attending Physician Herman Jiménez MD Operating Physician Herman Jiménez MD   PCP Cole Cary MD          Patient Name: Arnulfo Clark    Preoperative Diagnosis: Anal fistula [K60.3]    Postoperative Diagnosis: Same as preoperative diagnosis    Primary Surgeon: Herman Jiménez MD    Assistant: Dedra ORELLANA    Anesthesia: General    Procedures: Anal exam under anesthesia with anoscopy, creation of rectal advancement flap    Implants: None    Specimen:   Internal fistula opening  External fistula opening    Drains: None    Estimated Blood Loss: 10 cc    Complications: None immediate    Condition: Stable    Indications for Surgery:   This is a very nice 58-year-old gentleman who presents to clinic for postop check following anal exam under anesthesia, partial fistulotomy, and seton placement with Dr. Jiménez on 2024.     Patient states he is overall recovering well.  He does endorse some discomfort related to seton.  He is taking ibuprofen throughout the day, though still requiring Norco at night.  He reports some yellow drainage from incision.  He is doing his daily sitz bath's.  Reports normal bowel movements.  Denies any fevers or chills.  He denies any incontinence.     External exam of the anus reveals an external opening around 3-4 cm from the anal verge with vessel loop seton in place.  The knotted portion of the seton were lodged in the external opening.  The knotted portion of the seton was removed from the wound.  The wound was filled with granulation tissue and there was a small amount of purulent drainage.  Digital rectal exam and anoscopy deferred.     I recommend planning for repeat anal exam under anesthesia with anoscopy with most likely repair of the anal fistula via creation of a rectal advancement flap,  possible fistulotomy, possible seton replacement.  The details of the surgery were discussed including the expected recovery time, risk, benefits and alternatives.  I counseled patient that I fear his fistula has significant overlying sphincter involvement and therefore, he would not be a good candidate for fistulotomy as there would be too high risk for postoperative fecal incontinence.  Unfortunately, sphincter-sparing repair such as rectal advancement flap have lower healing rates compared to fistulotomy.  Other risks include but not limited to pain, bleeding, infection and change in continence.  Patient expressed understanding and was agreeable to schedule surgery with me.    Patient presents for elective surgery today.  Consent was signed.  All questions answered.    Surgical Findings:   High transsphincteric right anterior anal fistula with significant skin, soft tissue and sphincter muscle overlying the fistula tract. External opening 3 cm from the anal verge in the right anterior anoderm just off midline.  Internal opening in the intersphincteric groove.  Pink, well-vascularized, tension-free, partial-thickness rectal advancement flap    Description of Procedure:   Patient was brought to the operating room on the transport cart.  Bilateral sequential compression devices were placed. Preoperative antibiotics were given. Patient was induced under general endotracheal anesthesia.  Patient was then carefully flipped prone onto the OR table with all pressure points well-padded. Patient was positioned in prone jackknife with the buttocks retracted apart with silk tape.  The anus and perianal skin were prepped and draped in the usual sterile fashion.  A timeout was performed.       Began with external exam of the anus, digital rectal exam and anoscopy using a large Arnold-Thorpe anoscope.  I encountered the findings as described above.  Due to the significant involvement of the sphincter muscle overlying the  fistula tract, I decided to proceed with rectal advancement flap creation.     I began by scoring a line where I planned to raise the rectal advancement flap just distal to the internal opening using electrocautery.  A flap of rectal mucosa and partial thickness rectal muscle was raised circumferentially using electrocautery making sure that base of the flap was wider than its length.  Eventually, the flap was sufficiently mobilized to reach down and cover the internal opening without tension.  Hemostasis was achieved throughout the dissection using electrocautery.  The flap appeared pink and healthy.  Distal-most ischemic portion of the mucosa within the flap containing the internal opening was excised and passed off the field to be sent to pathology.     The seton was cut and removed.  The internal opening beneath the flap was closed using a interrupted 2-0 Vicryl U stitch.  The flap was secured down to its cut edge to cover the internal opening using interrupted 2-0 Vicryl sutures.  The anal canal was thoroughly irrigated with warm saline solution and appeared hemostatic. The flap appeared pink and healthy.  There were no palpable gaps between the flap and the cut edge of the distal rectal mucosa.     I excised the external fistula opening using electrocautery.  The tissue was passed off the field to be sent to pathology.  Hemostasis was achieved within the wound bed using electrocautery.  30 cc of 0.5% Marcaine with epinephrine was injected around the external opening and anus for local anesthesia.     The skin was cleaned and dried and a dressing of 4 x 4 gauze, ABD pad, tape, and underwear was applied. The patient was carefully flipped back supine onto the transport cart, awakened from anesthesia, extubated and transferred to the postanesthesia care unit in stable condition.  All sponge, needle and instrument counts were correct at the end of the case.  I was present for the entire case.      Herman Jiménez,  MD  4/19/2024  11:51 AM

## 2024-04-19 NOTE — ANESTHESIA PROCEDURE NOTES
Airway  Date/Time: 4/19/2024 9:47 AM  Urgency: elective      General Information and Staff    Patient location during procedure: OR  Anesthesiologist: Jeferson Cuellar MD  Performed: anesthesiologist   Performed by: Jeferson Cuellar MD  Authorized by: Jeferson Cuellar MD      Indications and Patient Condition  Indications for airway management: anesthesia  Sedation level: deep  Preoxygenated: yes  Patient position: sniffing  Mask difficulty assessment: 1 - vent by mask    Final Airway Details  Final airway type: endotracheal airway      Successful airway: ETT  Cuffed: yes   Successful intubation technique: direct laryngoscopy  Endotracheal tube insertion site: oral  Blade: Dorita  Blade size: #3    Cormack-Lehane Classification: grade I - full view of glottis  Placement verified by: capnometry   Cuff volume (mL): 8  Measured from: lips  ETT to lips (cm): 23  Number of attempts at approach: 1

## 2024-04-19 NOTE — H&P
Follow Up Visit Note       Active Problems      No diagnosis found.        Chief Complaint   No chief complaint on file.        History of Present Illness  This is a very nice 58-year-old gentleman who presents to clinic for postop check following anal exam under anesthesia, partial fistulotomy, and seton placement with Dr. Jiménez on 2/23/2024.    Patient states he is overall recovering well.  He does endorse some discomfort related to seton.  He is taking ibuprofen throughout the day, though still requiring Norco at night.  He reports some yellow drainage from incision.  He is doing his daily sitz bath's.  Reports normal bowel movements.  Denies any fevers or chills.  He denies any incontinence.      Allergies  Arnulfo is allergic to calamari oil [squid oil], grass, and ragweed.    Past Medical / Surgical / Social / Family History    The past medical and past surgical history have been reviewed by me today.    Past Medical History:    Age-related nuclear cataract of both eyes    Coronary atherosclerosis    Esophageal reflux    Glaucoma    started on Latanoprost qhs OS after abnormal OCT result 10/31/15    Heart attack (HCC)    High blood pressure    High cholesterol    Hx of motion sickness    Lipid screening    Lumbar herniated disc    Physical Therapy     MANAN on CPAP    Pigmentary dispersion syndrome    Pigmentary glaucoma of both eyes, moderate stage    1/30/2020- pt had been lost to follow up since 2016 and came in with IOP of 27 OU and abnormal VF and OCT OS- and was restarted on Latanoprost OS and started Latanoprost OD due to OHT    PONV (postoperative nausea and vomiting)    Sleep apnea    LAST CPAP USE 2 YRS AGO    Visual impairment    readers     Past Surgical History:   Procedure Laterality Date    Cabg  09/2023    Colonoscopy N/A 09/15/2020    Procedure: COLONOSCOPY;  Surgeon: Bhavesh Beltran MD;  Location: [facility] ENDOSCOPY    Colonoscopy      Colonoscopy N/A 01/16/2024    Dr. Beltran; Colon polyps     Colonoscopy N/A 01/16/2024    Procedure: COLONOSCOPY;  Surgeon: Bhavesh Beltran MD;  Location: [facility] ENDOSCOPY    Electrocardiogram, complete  02/25/2014    Scanned to media tab     Removal anal fistula,subcutaneous      Upper gi endoscopy,exam         The family history and social history have been reviewed by me today.    Family History   Problem Relation Age of Onset    Heart Disease Mother     Heart Disease Maternal Grandmother     Diabetes Neg     Glaucoma Neg     Macular degeneration Neg      Social History     Socioeconomic History    Marital status:    Tobacco Use    Smoking status: Never    Smokeless tobacco: Never   Vaping Use    Vaping status: Never Used   Substance and Sexual Activity    Alcohol use: Yes     Comment: daily glass of wine    Drug use: No   Other Topics Concern    Caffeine Concern Yes     Comment: Daily; coffee, 1 cup     Reaction to local anesthetic No      No current outpatient medications on file.     Review of Systems  A 10 point review of systems was performed and negative unless otherwise documented per HPI.     Physical Findings   BP (!) 154/95 (BP Location: Right arm)   Pulse 75   Temp 97 °F (36.1 °C) (Temporal)   Resp 16   Ht 70\"   Wt 202 lb 9.6 oz (91.9 kg)   SpO2 99%   BMI 29.07 kg/m²   Physical Exam  Vitals and nursing note reviewed. Exam conducted with a chaperone present.   Constitutional:       General: He is not in acute distress.  HENT:      Head: Normocephalic and atraumatic.      Mouth/Throat:      Mouth: Mucous membranes are moist.   Cardiovascular:      Rate and Rhythm: Normal rate and regular rhythm.   Pulmonary:      Effort: Pulmonary effort is normal.   Abdominal:      General: There is no distension.      Palpations: Abdomen is soft.      Tenderness: There is no abdominal tenderness.   Genitourinary:     Comments: Patient examined in the prone jackknife position.  External exam of the anus reveals an external opening around 3-4 cm from the anal  verge with vessel loop seton in place.  The knotted portion of the seton were lodged in the external opening.  The knotted portion of the seton was removed from the wound.  The wound was filled with granulation tissue and there was a small amount of purulent drainage.  Digital rectal exam and anoscopy deferred.  Musculoskeletal:         General: No deformity.   Skin:     General: Skin is warm and dry.   Neurological:      General: No focal deficit present.      Mental Status: He is alert.   Psychiatric:         Mood and Affect: Mood normal.          Assessment   No diagnosis found.    This is a very nice 58-year-old gentleman who presents to clinic for postop check following anal exam under anesthesia, partial fistulotomy, and seton placement with Dr. Jiménez on 2/23/2024.    Patient states he is overall recovering well.  He does endorse some discomfort related to seton.  He is taking ibuprofen throughout the day, though still requiring Norco at night.  He reports some yellow drainage from incision.  He is doing his daily sitz bath's.  Reports normal bowel movements.  Denies any fevers or chills.  He denies any incontinence.    External exam of the anus reveals an external opening around 3-4 cm from the anal verge with vessel loop seton in place.  The knotted portion of the seton were lodged in the external opening.  The knotted portion of the seton was removed from the wound.  The wound was filled with granulation tissue and there was a small amount of purulent drainage.  Digital rectal exam and anoscopy deferred.    Plan   I recommend planning for repeat anal exam under anesthesia with anoscopy with most likely repair of the anal fistula via creation of a rectal advancement flap, possible fistulotomy, possible seton replacement.  The details of the surgery were discussed including the expected recovery time, risk, benefits and alternatives.  I counseled patient that I fear his fistula has significant overlying  sphincter involvement and therefore, he would not be a good candidate for fistulotomy as there would be too high risk for postoperative fecal incontinence.  Unfortunately, sphincter-sparing repair such as rectal advancement flap have lower healing rates compared to fistulotomy.  Other risks include but not limited to pain, bleeding, infection and change in continence.  Patient expressed understanding and was agreeable to schedule surgery with me.    In the meantime, I recommend ongoing local wound care with sitz bath's and dry gauze dressing changes.  Patient can take over-the-counter pain medication as needed.     No orders of the defined types were placed in this encounter.      Imaging & Referrals   VITAL SIGNS  NURSING COMMUNICATION  PLACE PIV  ACTIVITY AS TOLERATED  HEIGHT AND WEIGHT  INITIATE ADULT PREOP PROPHYLACTIC ABX PROTOCOL  VERIFY INFORMED CONSENT  NURSING COMMUNICATION  NPO  VITAL SIGNS - NOTIFY PHYSICIAN    Follow Up  No follow-ups on file.    Herman Jiménez MD

## 2024-04-19 NOTE — ANESTHESIA POSTPROCEDURE EVALUATION
[facility]    Arnulfo Clark Patient Status:  Hospital Outpatient Surgery   Age/Gender 58 year old male MRN [id number]   Location [facility] SURGERY Attending Herman Jiménez MD   Hosp Day # 0 PCP Cole Cary MD       Anesthesia Post-op Note    ANAL EXAMINATION UNDER ANESTHESIA, CREATION OF RECTAL ADVANCEMENT FLAP    Procedure Summary       Date: 04/19/24 Room / Location:  MAIN OR 06 /  MAIN OR    Anesthesia Start: 0943 Anesthesia Stop: 1124    Procedure: ANAL EXAMINATION UNDER ANESTHESIA, CREATION OF RECTAL ADVANCEMENT FLAP Diagnosis:       Anal fistula      (Anal fistula [K60.3])    Surgeons: Herman Jiménez MD Anesthesiologist: Jeferson Cuellar MD    Anesthesia Type: general ASA Status: 3            Anesthesia Type: general    Vitals Value Taken Time   /82 04/19/24 1132   Temp 97 °F (36.1 °C) 04/19/24 1117   Pulse 80 04/19/24 1133   Resp 21 04/19/24 1133   SpO2 94 % 04/19/24 1133   Vitals shown include unfiled device data.    Patient Location: PACU    Anesthesia Type: general    Airway Patency: patent    Postop Pain Control: adequate    Mental Status: mildly sedated but able to meaningfully participate in the post-anesthesia evaluation    Nausea/Vomiting: none    Cardiopulmonary/Hydration status: stable euvolemic    Complications: no apparent anesthesia related complications    Postop vital signs: stable    Dental Exam: Unchanged from Preop

## 2024-04-19 NOTE — DISCHARGE INSTRUCTIONS
Anal Surgery  Post-Surgical Instructions     Herman Jiménez MD         MEDICATIONS  You will be given a prescription for pain.  Take 1 tablet every 6 hours as needed.  Pain medications will ease your pain, but you should expect some incisional pain for about 7-10 days.  You may take acetaminophen (Tylenol) up to 650 mg every 6 hours as needed for mild-moderate pain. You may take ibuprofen (Motrin) up to 600 mg every 6 hours as needed for mild-moderate pain. Take narcotic pain medication as needed for severe pain per your prescription. Do not drive while taking narcotic pain medication. Many patients take a stool softener as narcotics are known to cause constipation. You should walk often, cough and take deep breaths.       DIET  You will begin a high fiber diet.  The easiest way to a high fiber diet is to take a fiber supplement.  An excellent supplement is plain, unflavored Metamucil.  You should take one tablespoon in 8 oz of water twice a day.  Ideally, you should take the supplement before breakfast and dinner.  You may experience some gas bloating for the first 2 weeks. This is normal and will go away as long as you keep taking the supplement.  Other supplements that can be taken include Per Courtney, Benefiber, Konsyl, or Citrucel. Continue to take the fiber supplement for 1 month.     In addition, it is a good idea go to the drugstore and a stool softener, such as docusate, and a gentle laxative such as MiraLAX.  Taking MiraLAX daily and stool softener 1-2 times a day may prevent you from getting overly constipated while on the narcotic drug.  Once you stop taking the prescription pain medication, you may stop taking the stool softener and laxative.        WOUND CARE   You will perform sitz baths two-three times a day for 3-4 weeks.  Sitz baths simply mean soaking your anus in a tub of warm-hot water for about 15 minutes.  Sitz baths will clean the anal wound as well as relax the anal sphincter muscles, which  will help minimize your pain.  Be careful around the anal wound, especially if you have stitches on the outside.  Gently pat your anus dry (never rub) or simply dry your anus with a blow-dryer set to cool/warm.   Do not soak your anus for more than 15 minutes.        ACTIVITY  After surgery, your driving reflexes will be slower, especially if you are taking pain medications.  Therefore, you are restricted from driving until after your follow-up visit and after you have stopped taking your prescription pain meds.  You may walk about the house, go shopping, or eat at a restaurant.  You may also climb stairs and excerise but no weight lifting.  You can sit on your wound, but keep in mind that the less you sit, the less pain you will have.     APPOINTMENT  Please call our office for an appointment in 2-4 weeks after surgery, unless otherwise instructed.  This will allow ample time for the swelling and soreness to resolve before your wound is examined.  There may be some bleeding from your wound.  This is normal.  If you begin bleeding heavily, have fevers, chills, or if you are concerned about your wound, please call us immediately at [phone].     Thank you for entrusting us with your care.

## 2024-04-22 ENCOUNTER — TELEPHONE (OUTPATIENT)
Facility: LOCATION | Age: 59
End: 2024-04-22

## 2024-04-22 NOTE — TELEPHONE ENCOUNTER
4/19 ANAL EXAMINATION UNDER ANESTHESIA, CREATION OF RECTAL ADVANCEMENT FLAP   ----------------    Spoke with patient, who c/o severe rectal pain following surgery. States he has not been taking any pain meds - tylenol does not work and Norco makes him sleepy/nauseated. 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    Relayed to patient that his pain is normal and expected. Advised to take Tylenol 650mg q6 hrs, ibuprofen 600mg q6 hrs, Norco at night, sitz baths 3x/day, and ice packs PRN.  He verbalized understanding, is agreeable, and had no further questions or concerns.

## 2024-04-22 NOTE — TELEPHONE ENCOUNTER
Patient calling because he's having excruciating pain. He doesn't know if it's normal or not, but he's in really bad pain and wants to know what he can do.     Please advise  Best callback number is [phone]    Future Appointments   Date Time Provider Department Center   5/7/2024  9:30 AM Dalton Samson MD [id number]   5/16/2024 10:45 AM Herman Jiménez MD [id number]

## 2024-05-07 RX ORDER — LATANOPROST 50 UG/ML
1 SOLUTION/ DROPS OPHTHALMIC NIGHTLY
Qty: 3 EACH | Refills: 3 | Status: SHIPPED | OUTPATIENT
Start: 2024-05-07

## 2024-05-07 NOTE — TELEPHONE ENCOUNTER
Current Outpatient Medications   Medication Sig Dispense Refill    latanoprost 0.005 % Ophthalmic Solution Place 1 drop into both eyes nightly. Instill 1 drop by ophthalmic route every night into both eyes 7.5 mL 3

## 2024-05-16 ENCOUNTER — OFFICE VISIT (OUTPATIENT)
Facility: LOCATION | Age: 59
End: 2024-05-16

## 2024-05-16 VITALS — HEART RATE: 80 BPM | TEMPERATURE: 98 F

## 2024-05-16 DIAGNOSIS — K60.3 ANAL FISTULA: Primary | ICD-10-CM

## 2024-05-16 PROCEDURE — 99024 POSTOP FOLLOW-UP VISIT: CPT | Performed by: STUDENT IN AN ORGANIZED HEALTH CARE EDUCATION/TRAINING PROGRAM

## 2024-05-16 RX ORDER — AMOXICILLIN AND CLAVULANATE POTASSIUM 875; 125 MG/1; MG/1
1 TABLET, FILM COATED ORAL 2 TIMES DAILY
Qty: 28 TABLET | Refills: 0 | Status: SHIPPED | OUTPATIENT
Start: 2024-05-16

## 2024-05-17 NOTE — PROGRESS NOTES
Follow Up Visit Note       Active Problems      1. Anal fistula          Chief Complaint   Chief Complaint   Patient presents with    Post-Op     PO 4/19 ANAL EXAMINATION UNDER ANESTHESIA, CREATION OF RECTAL ADVANCEMENT FLAP- pt reports pain and discharge        History of Present Illness  This is a very nice 59-year-old gentleman with history of a high transsphincteric right anterior anal fistula who returns for follow-up today after undergoing creation of a rectal advancement flap in attempt to repair the anal fistula on 4/19/2024.    Unfortunately, patient states he has been quite miserable since surgery.  He had around 2 weeks of severe anorectal pain that is now improving.  He continues to have purulent drainage.  No fevers.  No fecal incontinence.  He is also very upset by the fact that he thinks his dental implant was broken during the procedure.  He states he is traveling to [place] to have a dental procedure soon.      Allergies  Arnulfo is allergic to calamari oil [squid oil], grass, and ragweed.    Past Medical / Surgical / Social / Family History    The past medical and past surgical history have been reviewed by me today.    Past Medical History:    Age-related nuclear cataract of both eyes    Coronary atherosclerosis    Esophageal reflux    Glaucoma    started on Latanoprost qhs OS after abnormal OCT result 10/31/15    Heart attack (HCC)    High blood pressure    High cholesterol    Hx of motion sickness    Lipid screening    Lumbar herniated disc    Physical Therapy     MANAN on CPAP    Pigmentary dispersion syndrome    Pigmentary glaucoma of both eyes, moderate stage    1/30/2020- pt had been lost to follow up since 2016 and came in with IOP of 27 OU and abnormal VF and OCT OS- and was restarted on Latanoprost OS and started Latanoprost OD due to OHT    PONV (postoperative nausea and vomiting)    Sleep apnea    LAST CPAP USE 2 YRS AGO    Visual impairment    readers     Past Surgical History:   Procedure  Laterality Date    Cabg  09/2023    Colonoscopy N/A 09/15/2020    Procedure: COLONOSCOPY;  Surgeon: Bhavesh Beltran MD;  Location: [facility] ENDOSCOPY    Colonoscopy      Colonoscopy N/A 01/16/2024    Dr. Beltran; Colon polyps    Colonoscopy N/A 01/16/2024    Procedure: COLONOSCOPY;  Surgeon: Bhavesh Beltran MD;  Location: [facility] ENDOSCOPY    Electrocardiogram, complete  02/25/2014    Scanned to media tab     Removal anal fistula,subcutaneous      Upper gi endoscopy,exam         The family history and social history have been reviewed by me today.    Family History   Problem Relation Age of Onset    Heart Disease Mother     Heart Disease Maternal Grandmother     Diabetes Neg     Glaucoma Neg     Macular degeneration Neg      Social History     Socioeconomic History    Marital status:    Tobacco Use    Smoking status: Never    Smokeless tobacco: Never   Vaping Use    Vaping status: Never Used   Substance and Sexual Activity    Alcohol use: Yes     Comment: daily glass of wine    Drug use: No   Other Topics Concern    Caffeine Concern Yes     Comment: Daily; coffee, 1 cup     Reaction to local anesthetic No        Current Outpatient Medications:     amoxicillin clavulanate 875-125 MG Oral Tab, Take 1 tablet by mouth 2 (two) times daily., Disp: 28 tablet, Rfl: 0    latanoprost 0.005 % Ophthalmic Solution, Place 1 drop into both eyes nightly. Instill 1 drop by ophthalmic route every night into both eyes, Disp: 3 each, Rfl: 3    HYDROcodone-acetaminophen 5-325 MG Oral Tab, Take 1 tablet by mouth every 6 (six) hours as needed for Pain., Disp: 20 tablet, Rfl: 0    metoprolol tartrate 25 MG Oral Tab, Take 1 tablet (25 mg total) by mouth as needed. TAKEN BID BUT HELD FOR 2 DAYS DUE TO SIDE EFFECTS SLEEPYNESS. TIREDNESS,LAST DOSE 2/10/24, Disp: , Rfl:     rosuvastatin 40 MG Oral Tab, Take 1 tablet (40 mg total) by mouth nightly., Disp: 30 tablet, Rfl: 3    aspirin 81 MG Oral Tab EC, Take 1 tablet (81 mg total)  by mouth daily., Disp: 30 tablet, Rfl: 3     Review of Systems  A 10 point review of systems was performed and negative unless otherwise documented per HPI.     Physical Findings   Pulse 80   Temp 97.6 °F (36.4 °C) (Temporal)   Physical Exam  Vitals and nursing note reviewed. Exam conducted with a chaperone present.   Constitutional:       General: He is not in acute distress.  HENT:      Head: Normocephalic and atraumatic.      Mouth/Throat:      Mouth: Mucous membranes are moist.   Cardiovascular:      Rate and Rhythm: Normal rate and regular rhythm.   Pulmonary:      Effort: Pulmonary effort is normal.   Abdominal:      General: There is no distension.      Palpations: Abdomen is soft.      Tenderness: There is no abdominal tenderness.   Genitourinary:     Comments: Patient examined in the prone jackknife position with medical assistant chaperone present.  External exam of the anus reveals purulent discharge that appears to be coming from an open wound in the right anterior anoderm just off midline and also from the anus.  Patient was tender with spreading of the anoderm.  Digital rectal exam and anoscopy deferred.  Musculoskeletal:         General: No deformity.   Skin:     General: Skin is warm and dry.   Neurological:      General: No focal deficit present.      Mental Status: He is alert.   Psychiatric:         Mood and Affect: Mood normal.          Assessment   1. Anal fistula      This is a very nice 59-year-old gentleman with history of a high transsphincteric right anterior anal fistula who returns for follow-up today after undergoing creation of a rectal advancement flap in attempt to repair the anal fistula on 4/19/2024.    Unfortunately, patient states he has been quite miserable since surgery.  He had around 2 weeks of severe anorectal pain that is now improving.  He continues to have purulent drainage.  No fevers.  No fecal incontinence.  He is also very upset by the fact that he thinks his dental  implant was broken during the procedure.  He states he is traveling to [place] to have a dental procedure soon.    External exam of the anus reveals purulent discharge that appears to be coming from an open wound in the right anterior anoderm just off midline and also from the anus.  Patient was tender with spreading of the anoderm.  Digital rectal exam and anoscopy deferred.    Plan   I expressed sympathy to the patient in regards to his difficult time in the postoperative period.  I do recommend a course of antibiotics given his ongoing purulent drainage seen on exam today.  I counseled patient that it is still early to tell if the flap has failed and there is a recurrent fistula or not.  I would like to see him back in the next 2-4 weeks once he is back from [place] for ongoing follow-up and repeat examination.  If he continues to have pain and drainage in this area, we may need to consider repeat anal exam under anesthesia to rule out recurrent/persistent anal fistula.  Patient expressed understanding and was agreeable to plan.     No orders of the defined types were placed in this encounter.      Imaging & Referrals   None    Follow Up  No follow-ups on file.    Herman Jiménez MD

## 2024-06-24 ENCOUNTER — OFFICE VISIT (OUTPATIENT)
Facility: LOCATION | Age: 59
End: 2024-06-24

## 2024-06-24 VITALS — HEART RATE: 78 BPM | TEMPERATURE: 98 F

## 2024-06-24 DIAGNOSIS — K60.3 ANAL FISTULA: Primary | ICD-10-CM

## 2024-06-24 PROCEDURE — 99024 POSTOP FOLLOW-UP VISIT: CPT | Performed by: STUDENT IN AN ORGANIZED HEALTH CARE EDUCATION/TRAINING PROGRAM

## 2024-06-25 NOTE — PROGRESS NOTES
Follow Up Visit Note       Active Problems      1. Anal fistula          Chief Complaint   Chief Complaint   Patient presents with    Post-Op     PO 4/19 ANAL EXAMINATION UNDER ANESTHESIA, CREATION OF RECTAL ADVANCEMENT FLAP       History of Present Illness  This is a very nice 59-year-old gentleman with history of a high transsphincteric right anterior anal fistula who returns for follow-up today after undergoing creation of a rectal advancement flap in attempt to repair the anal fistula on 4/19/2024.    Unfortunately, patient states he has been experiencing worsening anorectal symptoms since my surgeries.  He is continuing to have drainage through a spot in his perineum.  He also states he passes flatus and stool through the opening.  He is not having any rectal pain.  No fevers or urinary symptoms.      Allergies  Arnulfo is allergic to calamari oil [squid oil], grass, and ragweed.    Past Medical / Surgical / Social / Family History    The past medical and past surgical history have been reviewed by me today.    Past Medical History:    Age-related nuclear cataract of both eyes    Coronary atherosclerosis    Esophageal reflux    Glaucoma    started on Latanoprost qhs OS after abnormal OCT result 10/31/15    Heart attack (HCC)    High blood pressure    High cholesterol    Hx of motion sickness    Lipid screening    Lumbar herniated disc    Physical Therapy     MANAN on CPAP    Pigmentary dispersion syndrome    Pigmentary glaucoma of both eyes, moderate stage    1/30/2020- pt had been lost to follow up since 2016 and came in with IOP of 27 OU and abnormal VF and OCT OS- and was restarted on Latanoprost OS and started Latanoprost OD due to OHT    PONV (postoperative nausea and vomiting)    Sleep apnea    LAST CPAP USE 2 YRS AGO    Visual impairment    readers     Past Surgical History:   Procedure Laterality Date    Cabg  09/2023    Colonoscopy N/A 09/15/2020    Procedure: COLONOSCOPY;  Surgeon: Bhavesh Beltran  MD;  Location: [facility] ENDOSCOPY    Colonoscopy      Colonoscopy N/A 01/16/2024    Dr. Beltran; Colon polyps    Colonoscopy N/A 01/16/2024    Procedure: COLONOSCOPY;  Surgeon: Bhavesh Beltran MD;  Location: [facility] ENDOSCOPY    Electrocardiogram, complete  02/25/2014    Scanned to media tab     Removal anal fistula,subcutaneous      Upper gi endoscopy,exam         The family history and social history have been reviewed by me today.    Family History   Problem Relation Age of Onset    Heart Disease Mother     Heart Disease Maternal Grandmother     Diabetes Neg     Glaucoma Neg     Macular degeneration Neg      Social History     Socioeconomic History    Marital status:    Tobacco Use    Smoking status: Never    Smokeless tobacco: Never   Vaping Use    Vaping status: Never Used   Substance and Sexual Activity    Alcohol use: Yes     Comment: daily glass of wine    Drug use: No   Other Topics Concern    Caffeine Concern Yes     Comment: Daily; coffee, 1 cup     Reaction to local anesthetic No        Current Outpatient Medications:     amoxicillin clavulanate 875-125 MG Oral Tab, Take 1 tablet by mouth 2 (two) times daily., Disp: 28 tablet, Rfl: 0    latanoprost 0.005 % Ophthalmic Solution, Place 1 drop into both eyes nightly. Instill 1 drop by ophthalmic route every night into both eyes, Disp: 3 each, Rfl: 3    HYDROcodone-acetaminophen 5-325 MG Oral Tab, Take 1 tablet by mouth every 6 (six) hours as needed for Pain., Disp: 20 tablet, Rfl: 0    metoprolol tartrate 25 MG Oral Tab, Take 1 tablet (25 mg total) by mouth as needed. TAKEN BID BUT HELD FOR 2 DAYS DUE TO SIDE EFFECTS SLEEPYNESS. TIREDNESS,LAST DOSE 2/10/24, Disp: , Rfl:     rosuvastatin 40 MG Oral Tab, Take 1 tablet (40 mg total) by mouth nightly., Disp: 30 tablet, Rfl: 3    aspirin 81 MG Oral Tab EC, Take 1 tablet (81 mg total) by mouth daily., Disp: 30 tablet, Rfl: 3     Review of Systems  A 10 point review of systems was performed and negative  unless otherwise documented per HPI.     Physical Findings   Pulse 78   Temp 97.5 °F (36.4 °C) (Temporal)   Physical Exam  Vitals and nursing note reviewed. Exam conducted with a chaperone present.   Constitutional:       General: He is not in acute distress.  HENT:      Head: Normocephalic and atraumatic.      Mouth/Throat:      Mouth: Mucous membranes are moist.   Cardiovascular:      Rate and Rhythm: Normal rate and regular rhythm.   Pulmonary:      Effort: Pulmonary effort is normal.   Abdominal:      General: There is no distension.      Palpations: Abdomen is soft.      Tenderness: There is no abdominal tenderness.   Genitourinary:     Comments: Patient examined in the prone jackknife position.  Patient declined chaperone.  There is a small area of raised granulation tissue in the perineum around 5 to 6 cm from the anal verge just to the right of midline.  I was able to express a small amount of purulent drainage from this opening. Patient was tender with spreading of the anoderm.  External exam of the anus is normal.  Patient asked me not to perform digital rectal exam or anoscopy.  Musculoskeletal:         General: No deformity.   Skin:     General: Skin is warm and dry.   Neurological:      General: No focal deficit present.      Mental Status: He is alert.   Psychiatric:         Mood and Affect: Mood normal.          Assessment   1. Anal fistula      This is a very nice 59-year-old gentleman with history of a high transsphincteric right anterior anal fistula who returns for follow-up today after undergoing creation of a rectal advancement flap in attempt to repair the anal fistula on 4/19/2024.    Unfortunately, patient states he has been experiencing worsening anorectal symptoms since my surgeries.  He is continuing to have drainage through a spot in his perineum.  He also states he passes flatus and stool through the opening.  He is not having any rectal pain.  No fevers or urinary symptoms.    There is  a small area of raised granulation tissue in the perineum around 5 to 6 cm from the anal verge just to the right of midline.  I was able to express a small amount of purulent drainage from this opening. Patient was tender with spreading of the anoderm.  External exam of the anus is normal.  Patient asked me not to perform digital rectal exam or anoscopy.    Plan   I expressed sympathy to the patient in regards to his difficult time in the postoperative period.  Unfortunately, I am suspicious the patient has a persistent anal fistula given his persistent external opening with purulent drainage and his symptoms of passing flatus and stool through the external opening.    I offered patient repeat anal exam under anesthesia with anoscopy, possible fistulotomy, possible seton placement, possible excision of perineal sinus depending on intraoperative findings.  The details of this procedure were discussed including the expected recovery time, risks, benefits and alternatives. I counseled patient that I will begin by probing the external opening in the perineum and injecting it with peroxide to confirm the presence of an internal opening and anal fistula. If an anal fistula is identified, I would likely perform a partial fistulotomy and seton placement. If no fistula is identified, I would excise the perianal sinus in hopes that it will heal by secondary intention. Specifically, I counseled patient he may need more procedures down the road if there is a seton placed.  Patient expressed understanding and wished to schedule surgery.  Surgery has been scheduled for 8/2/2024.     No orders of the defined types were placed in this encounter.      Imaging & Referrals   None    Follow Up  No follow-ups on file.    Herman Jiménez MD

## 2024-07-16 ENCOUNTER — APPOINTMENT (OUTPATIENT)
Dept: ADMINISTRATIVE | Facility: HOSPITAL | Age: 59
End: 2024-07-16
Payer: MEDICAID

## 2024-07-16 RX ORDER — FUROSEMIDE 20 MG/1
20 TABLET ORAL DAILY
COMMUNITY
End: 2024-07-16 | Stop reason: ALTCHOICE

## 2024-07-16 RX ORDER — METOPROLOL TARTRATE 100 MG/1
100 TABLET ORAL 2 TIMES DAILY
COMMUNITY
Start: 2024-06-14

## 2024-07-16 RX ORDER — CLOPIDOGREL BISULFATE 75 MG/1
75 TABLET ORAL DAILY
COMMUNITY
Start: 2024-06-14 | End: 2024-07-16 | Stop reason: ALTCHOICE

## 2024-07-16 RX ORDER — CEPHALEXIN 250 MG/1
250 CAPSULE ORAL 3 TIMES DAILY
COMMUNITY
Start: 2024-04-03 | End: 2024-07-16 | Stop reason: ALTCHOICE

## 2024-07-17 ENCOUNTER — TELEPHONE (OUTPATIENT)
Facility: LOCATION | Age: 59
End: 2024-07-17

## 2024-07-17 NOTE — TELEPHONE ENCOUNTER
INEZ WHALEY Patient  Member ID  [id number]    Date of Birth  1965-05-10    Gender  Male    Transaction Type  Outpatient Authorization    Organization  [facility]    Payer  [facility] logo     Certificate Information  Reference Number  [id number]    Status  NO ACTION REQUIRED    Message  Requested Service does not require preauthorization. We would strongly encourage you to check benefits for this service.    Member Information  Patient Name  INEZ WHALEY    Patient Date of Birth  1965-05-10    Patient Gender  Male    Member ID  [id number]    Relationship to Subscriber  Self    Subscriber Name  INEZ WHALEY    Requesting Provider     Name  MITA JOSE R    NPI  [id number]    Tax Id  [id number]    Specialty  [id number]  Provider Role  Provider    Address  [facility] [place] [address]    Phone  [phone]  Fax  [phone]    Contact Name  AIDA CESAR    Service Information  Service Type  2 - Surgical    Place of Service  22 - On [facility]    Service From - To Date  2024-08-02 - 2024-11-13    Level of Service  Elective    Diagnosis Code 1  K603 - Anal fistula    Procedure Code 1 (CPT/HCPCS)  53672 - SURG DX EXAM ANORECTAL    Quantity  1 Units    Status  NO ACTION REQUIRED    Procedure Code 2 (CPT/HCPCS)  50424 - REMOVE ANAL FIST INTER    Quantity  1 Units    Status  NO ACTION REQUIRED    Procedure Code 3 (CPT/HCPCS)  17064 - PLACEMENT OF SETON    Quantity  1 Units    Status  NO ACTION REQUIRED

## 2024-07-24 ENCOUNTER — LAB ENCOUNTER (OUTPATIENT)
Dept: LAB | Facility: HOSPITAL | Age: 59
End: 2024-07-24
Payer: MEDICAID

## 2024-07-24 ENCOUNTER — EKG ENCOUNTER (OUTPATIENT)
Dept: LAB | Facility: HOSPITAL | Age: 59
End: 2024-07-24
Payer: MEDICAID

## 2024-07-24 DIAGNOSIS — Z01.818 PRE-OP TESTING: ICD-10-CM

## 2024-07-24 LAB
ANION GAP SERPL CALC-SCNC: 2 MMOL/L (ref 0–18)
ATRIAL RATE: 67 BPM
BUN BLD-MCNC: 16 MG/DL (ref 9–23)
BUN/CREAT SERPL: 14.7 (ref 10–20)
CALCIUM BLD-MCNC: 9.6 MG/DL (ref 8.7–10.4)
CHLORIDE SERPL-SCNC: 106 MMOL/L (ref 98–112)
CO2 SERPL-SCNC: 30 MMOL/L (ref 21–32)
CREAT BLD-MCNC: 1.09 MG/DL
EGFRCR SERPLBLD CKD-EPI 2021: 78 ML/MIN/1.73M2 (ref 60–?)
FASTING STATUS PATIENT QL REPORTED: YES
GLUCOSE BLD-MCNC: 144 MG/DL (ref 70–99)
OSMOLALITY SERPL CALC.SUM OF ELEC: 290 MOSM/KG (ref 275–295)
P AXIS: 54 DEGREES
P-R INTERVAL: 164 MS
POTASSIUM SERPL-SCNC: 4.6 MMOL/L (ref 3.5–5.1)
Q-T INTERVAL: 464 MS
QRS DURATION: 100 MS
QTC CALCULATION (BEZET): 490 MS
R AXIS: 47 DEGREES
SODIUM SERPL-SCNC: 138 MMOL/L (ref 136–145)
T AXIS: 66 DEGREES
VENTRICULAR RATE: 67 BPM

## 2024-07-24 PROCEDURE — 80048 BASIC METABOLIC PNL TOTAL CA: CPT

## 2024-07-24 PROCEDURE — 93010 ELECTROCARDIOGRAM REPORT: CPT | Performed by: INTERNAL MEDICINE

## 2024-07-24 PROCEDURE — 36415 COLL VENOUS BLD VENIPUNCTURE: CPT

## 2024-07-24 PROCEDURE — 93005 ELECTROCARDIOGRAM TRACING: CPT

## 2024-08-01 ENCOUNTER — ANESTHESIA EVENT (OUTPATIENT)
Dept: SURGERY | Facility: HOSPITAL | Age: 59
End: 2024-08-01
Payer: MEDICAID

## 2024-08-02 ENCOUNTER — ANESTHESIA (OUTPATIENT)
Dept: SURGERY | Facility: HOSPITAL | Age: 59
End: 2024-08-02
Payer: MEDICAID

## 2024-08-02 ENCOUNTER — HOSPITAL ENCOUNTER (OUTPATIENT)
Facility: HOSPITAL | Age: 59
Setting detail: HOSPITAL OUTPATIENT SURGERY
Discharge: HOME | End: 2024-08-02
Attending: STUDENT IN AN ORGANIZED HEALTH CARE EDUCATION/TRAINING PROGRAM | Admitting: STUDENT IN AN ORGANIZED HEALTH CARE EDUCATION/TRAINING PROGRAM
Payer: MEDICAID

## 2024-08-02 VITALS
OXYGEN SATURATION: 95 % | SYSTOLIC BLOOD PRESSURE: 144 MMHG | HEART RATE: 76 BPM | BODY MASS INDEX: 28.92 KG/M2 | WEIGHT: 202 LBS | TEMPERATURE: 98 F | DIASTOLIC BLOOD PRESSURE: 91 MMHG | HEIGHT: 70 IN | RESPIRATION RATE: 16 BRPM

## 2024-08-02 DIAGNOSIS — K60.3 ANAL FISTULA: ICD-10-CM

## 2024-08-02 DIAGNOSIS — Z01.818 PRE-OP TESTING: Primary | ICD-10-CM

## 2024-08-02 PROCEDURE — 46922 EXCISION OF ANAL LESION(S): CPT | Performed by: STUDENT IN AN ORGANIZED HEALTH CARE EDUCATION/TRAINING PROGRAM

## 2024-08-02 PROCEDURE — 46280 REMOVE ANAL FIST COMPLEX: CPT | Performed by: STUDENT IN AN ORGANIZED HEALTH CARE EDUCATION/TRAINING PROGRAM

## 2024-08-02 PROCEDURE — 0DBQXZZ EXCISION OF ANUS, EXTERNAL APPROACH: ICD-10-PCS | Performed by: STUDENT IN AN ORGANIZED HEALTH CARE EDUCATION/TRAINING PROGRAM

## 2024-08-02 RX ORDER — ESMOLOL HYDROCHLORIDE 10 MG/ML
INJECTION INTRAVENOUS AS NEEDED
Status: DISCONTINUED | OUTPATIENT
Start: 2024-08-02 | End: 2024-08-02 | Stop reason: SURG

## 2024-08-02 RX ORDER — SODIUM CHLORIDE, SODIUM LACTATE, POTASSIUM CHLORIDE, CALCIUM CHLORIDE 600; 310; 30; 20 MG/100ML; MG/100ML; MG/100ML; MG/100ML
INJECTION, SOLUTION INTRAVENOUS CONTINUOUS
Status: DISCONTINUED | OUTPATIENT
Start: 2024-08-02 | End: 2024-08-02

## 2024-08-02 RX ORDER — ONDANSETRON 2 MG/ML
4 INJECTION INTRAMUSCULAR; INTRAVENOUS EVERY 6 HOURS PRN
Status: DISCONTINUED | OUTPATIENT
Start: 2024-08-02 | End: 2024-08-02

## 2024-08-02 RX ORDER — HYDROMORPHONE HYDROCHLORIDE 1 MG/ML
0.4 INJECTION, SOLUTION INTRAMUSCULAR; INTRAVENOUS; SUBCUTANEOUS EVERY 5 MIN PRN
Status: DISCONTINUED | OUTPATIENT
Start: 2024-08-02 | End: 2024-08-02

## 2024-08-02 RX ORDER — ACETAMINOPHEN 500 MG
1000 TABLET ORAL ONCE
Status: DISCONTINUED | OUTPATIENT
Start: 2024-08-02 | End: 2024-08-02 | Stop reason: HOSPADM

## 2024-08-02 RX ORDER — ONDANSETRON 2 MG/ML
INJECTION INTRAMUSCULAR; INTRAVENOUS AS NEEDED
Status: DISCONTINUED | OUTPATIENT
Start: 2024-08-02 | End: 2024-08-02 | Stop reason: SURG

## 2024-08-02 RX ORDER — DEXTROSE MONOHYDRATE 25 G/50ML
50 INJECTION, SOLUTION INTRAVENOUS
Status: DISCONTINUED | OUTPATIENT
Start: 2024-08-02 | End: 2024-08-02

## 2024-08-02 RX ORDER — ROCURONIUM BROMIDE 10 MG/ML
INJECTION, SOLUTION INTRAVENOUS AS NEEDED
Status: DISCONTINUED | OUTPATIENT
Start: 2024-08-02 | End: 2024-08-02 | Stop reason: SURG

## 2024-08-02 RX ORDER — SCOLOPAMINE TRANSDERMAL SYSTEM 1 MG/1
1 PATCH, EXTENDED RELEASE TRANSDERMAL ONCE
Status: DISCONTINUED | OUTPATIENT
Start: 2024-08-02 | End: 2024-08-02 | Stop reason: HOSPADM

## 2024-08-02 RX ORDER — NICOTINE POLACRILEX 4 MG
15 LOZENGE BUCCAL
Status: DISCONTINUED | OUTPATIENT
Start: 2024-08-02 | End: 2024-08-02

## 2024-08-02 RX ORDER — NALOXONE HYDROCHLORIDE 0.4 MG/ML
0.08 INJECTION, SOLUTION INTRAMUSCULAR; INTRAVENOUS; SUBCUTANEOUS AS NEEDED
Status: DISCONTINUED | OUTPATIENT
Start: 2024-08-02 | End: 2024-08-02

## 2024-08-02 RX ORDER — HYDROCODONE BITARTRATE AND ACETAMINOPHEN 5; 325 MG/1; MG/1
1 TABLET ORAL EVERY 6 HOURS PRN
Qty: 12 TABLET | Refills: 0 | Status: SHIPPED | OUTPATIENT
Start: 2024-08-02

## 2024-08-02 RX ORDER — HYDROCODONE BITARTRATE AND ACETAMINOPHEN 5; 325 MG/1; MG/1
2 TABLET ORAL ONCE AS NEEDED
Status: DISCONTINUED | OUTPATIENT
Start: 2024-08-02 | End: 2024-08-02

## 2024-08-02 RX ORDER — BUPIVACAINE HYDROCHLORIDE AND EPINEPHRINE 5; 5 MG/ML; UG/ML
INJECTION, SOLUTION EPIDURAL; INTRACAUDAL; PERINEURAL AS NEEDED
Status: DISCONTINUED | OUTPATIENT
Start: 2024-08-02 | End: 2024-08-02 | Stop reason: HOSPADM

## 2024-08-02 RX ORDER — METRONIDAZOLE 500 MG/100ML
500 INJECTION, SOLUTION INTRAVENOUS ONCE
Status: COMPLETED | OUTPATIENT
Start: 2024-08-02 | End: 2024-08-02

## 2024-08-02 RX ORDER — HYDROCODONE BITARTRATE AND ACETAMINOPHEN 5; 325 MG/1; MG/1
1 TABLET ORAL ONCE AS NEEDED
Status: DISCONTINUED | OUTPATIENT
Start: 2024-08-02 | End: 2024-08-02

## 2024-08-02 RX ORDER — LABETALOL HYDROCHLORIDE 5 MG/ML
INJECTION, SOLUTION INTRAVENOUS AS NEEDED
Status: DISCONTINUED | OUTPATIENT
Start: 2024-08-02 | End: 2024-08-02 | Stop reason: SURG

## 2024-08-02 RX ORDER — HYDROMORPHONE HYDROCHLORIDE 1 MG/ML
0.6 INJECTION, SOLUTION INTRAMUSCULAR; INTRAVENOUS; SUBCUTANEOUS EVERY 5 MIN PRN
Status: DISCONTINUED | OUTPATIENT
Start: 2024-08-02 | End: 2024-08-02

## 2024-08-02 RX ORDER — DEXAMETHASONE SODIUM PHOSPHATE 4 MG/ML
VIAL (ML) INJECTION AS NEEDED
Status: DISCONTINUED | OUTPATIENT
Start: 2024-08-02 | End: 2024-08-02 | Stop reason: SURG

## 2024-08-02 RX ORDER — PROCHLORPERAZINE EDISYLATE 5 MG/ML
5 INJECTION INTRAMUSCULAR; INTRAVENOUS EVERY 8 HOURS PRN
Status: DISCONTINUED | OUTPATIENT
Start: 2024-08-02 | End: 2024-08-02

## 2024-08-02 RX ORDER — NICOTINE POLACRILEX 4 MG
30 LOZENGE BUCCAL
Status: DISCONTINUED | OUTPATIENT
Start: 2024-08-02 | End: 2024-08-02

## 2024-08-02 RX ORDER — ACETAMINOPHEN 500 MG
1000 TABLET ORAL ONCE AS NEEDED
Status: DISCONTINUED | OUTPATIENT
Start: 2024-08-02 | End: 2024-08-02

## 2024-08-02 RX ORDER — HYDROMORPHONE HYDROCHLORIDE 1 MG/ML
0.2 INJECTION, SOLUTION INTRAMUSCULAR; INTRAVENOUS; SUBCUTANEOUS EVERY 5 MIN PRN
Status: DISCONTINUED | OUTPATIENT
Start: 2024-08-02 | End: 2024-08-02

## 2024-08-02 RX ADMIN — LABETALOL HYDROCHLORIDE 5 MG: 5 INJECTION, SOLUTION INTRAVENOUS at 10:51:00

## 2024-08-02 RX ADMIN — ROCURONIUM BROMIDE 40 MG: 10 INJECTION, SOLUTION INTRAVENOUS at 10:15:00

## 2024-08-02 RX ADMIN — METRONIDAZOLE 500 MG: 500 INJECTION, SOLUTION INTRAVENOUS at 10:31:00

## 2024-08-02 RX ADMIN — ONDANSETRON 4 MG: 2 INJECTION INTRAMUSCULAR; INTRAVENOUS at 10:28:00

## 2024-08-02 RX ADMIN — DEXAMETHASONE SODIUM PHOSPHATE 8 MG: 4 MG/ML VIAL (ML) INJECTION at 10:29:00

## 2024-08-02 RX ADMIN — ESMOLOL HYDROCHLORIDE 10 MG: 10 INJECTION INTRAVENOUS at 10:50:00

## 2024-08-02 NOTE — ANESTHESIA PROCEDURE NOTES
Airway  Date/Time: 8/2/2024 10:19 AM  Urgency: elective    Airway not difficult    General Information and Staff    Patient location during procedure: OR  Anesthesiologist: Demarcus Ruby DO  Performed: anesthesiologist   Performed by: Demarcus Ruby DO  Authorized by: Demarcus Ruby DO      Indications and Patient Condition  Indications for airway management: anesthesia  Sedation level: deep  Preoxygenated: yes  Patient position: sniffing  Mask difficulty assessment: 2 - vent by mask + OA or adjuvant +/- NMBA    Final Airway Details  Final airway type: endotracheal airway      Successful airway: ETT  Cuffed: yes   Successful intubation technique: Video laryngoscopy  Facilitating devices/methods: intubating stylet  Endotracheal tube insertion site: oral  Blade: GlideScope  Blade size: #3  ETT size (mm): 7.5    Cormack-Lehane Classification: grade I - full view of glottis  Placement verified by: capnometry   Measured from: lips  ETT to lips (cm): 24  Number of attempts at approach: 1  Number of other approaches attempted: 0

## 2024-08-02 NOTE — OPERATIVE REPORT
[facility]  Operative Note    Arnulfo Clark Location: OR   [facility] [id number] MRN [id number]    5/10/1965 Age 59 year old   Admission Date 2024 Operation Date 2024   Attending Physician Herman Jiménez MD Operating Physician Herman Jiménez MD   PCP Cole Cary MD          Patient Name: Arnulfo Clark    Preoperative Diagnosis: Anal fistula [K60.3]    Postoperative Diagnosis: Anal fistula, hypertrophied anal papilla    Primary Surgeon: Herman Jiménez MD    Assistant: REYNA Lobato    Anesthesia: Choice    Procedures: Anal exam under anesthesia with anoscopy, partial fistulotomy and seton placement, excision of hypertrophied anal papilla    Implants: None    Specimen: Hypertrophied anal papilla    Drains: Vessel loop seton    Estimated Blood Loss: 5 cc    Complications: None immediate    Condition: Stable    Indications for Surgery:   This is a very nice 59-year-old gentleman with history of a high transsphincteric right anterior anal fistula who underwent creation of a rectal advancement flap in attempt to repair the anal fistula on 2024.     Unfortunately, patient states he has been experiencing worsening anorectal symptoms since my surgeries.  He is continuing to have drainage through a spot in his perineum.  He also states he passes flatus and stool through the opening.  He is not having any rectal pain.  No fevers or urinary symptoms.     On bedside exam, there is a small area of raised granulation tissue in the perineum around 5 to 6 cm from the anal verge just to the right of midline.  I was able to express a small amount of purulent drainage from this opening. Patient was tender with spreading of the anoderm.  External exam of the anus is normal.  Patient asked me not to perform digital rectal exam or anoscopy at bedside.     I expressed sympathy to the patient in regards to his difficult time in the postoperative period.  Unfortunately, I am suspicious the patient has a persistent anal  fistula given his persistent external opening with purulent drainage and his symptoms of passing flatus and stool through the external opening.     I offered patient repeat anal exam under anesthesia with anoscopy, possible fistulotomy, possible seton placement, possible excision of perineal sinus depending on intraoperative findings.  The details of this procedure were discussed including the expected recovery time, risks, benefits and alternatives. I counseled patient that I will begin by probing the external opening in the perineum and injecting it with peroxide to confirm the presence of an internal opening and anal fistula. If an anal fistula is identified, I would likely perform a partial fistulotomy and seton placement. If no fistula is identified, I would excise the perianal sinus in hopes that it will heal by secondary intention. Specifically, I counseled patient he may need more procedures down the road if there is a seton placed.  Patient expressed understanding and wished to schedule surgery.      Patient presents for elective surgery today.  Consent was signed.  All questions answered.    Surgical Findings:   Recurrent/persistent high transsphincteric anterior anal fistula.  External opening in the perineum just to the right of midline 5 cm from the anal verge.  Internal opening at the level of the dentate line just to the right of midline.  The fistula is rather deep and encompassed the majority of the sphincter complex.  Pedunculated hypertrophied anal papilla within the proximal anal canal.    Description of Procedure:   Patient was brought to the operating room on the transport cart. Bilateral sequential compression devices were placed. Preoperative antibiotics were given.  Patient was induced under general endotracheal anesthesia.  Patient was then carefully flipped prone onto the OR table with all pressure points well-padded.  Patient was positioned in prone jackknife with the buttocks retracted  apart with silk tape.  The anus and perianal skin were prepped and draped in the usual sterile fashion.  A timeout was performed.    I began with external exam of the anus, digital rectal exam and anoscopy using a small Hill-Thorpe anoscope and Rousseau bivalve anoscope.  I encountered the findings as described above.  There was a raised skin opening in the perineum just to the right of midline.  I applied pressure to this opening while examining the anal canal with a Hill-Thorpe anoscope in place.  This resulted in purulent fluid expression from the anterior midline anal canal.  I used a fistula probe to cannulate the external opening.  I encountered a tract towards the anal canal.  The fistula probe easily fell into the internal opening in the anterior midline anal canal at the level of the dentate line.  Again, the fistula was rather deep and encompassed the majority of the sphincter complex.  I decided to proceed with partial fistulotomy and seton placement.    The external/distalmost portion of the fistula tract was incised over the fistula probe using electrocautery.  The fistulotomy continued until I encountered the start of the external sphincter muscle.  The granulation tissue within the distal aspect of the fistula tract was curetted using a lap pad and curette.  A silk tie was tied to the fistula probe and passed through the fistula tract.  The hypertrophied anal papilla within the anterior anal canal was excised with electrocautery.  The tissue was passed off the field to be sent to pathology.  Hemostasis was achieved at the wound base using electrocautery.  A vessel loop was tied to the silk suture and passed through the fistula tract.  The vessel loop was secured to itself in a teardrop fashion using 3 interrupted 0 silk ties.  Hemostasis was achieved within the fistulotomy wound using electrocautery.  30 cc of 0.5% Marcaine with epinephrine was injected around the anus and partial fistulotomy wound  for local anesthesia.  A dressing of 4 x 4 gauze, ABD, paper tape and underwear was applied.    Patient was carefully flipped back supine onto the transport cart, awakened from anesthesia, extubated and transferred to the postanesthesia care unit in stable condition.  All sponge, needle instrument counts were correct at the end of the case.  I was present for the entire case.      Herman Jiménez MD  8/2/2024  11:23 AM

## 2024-08-02 NOTE — H&P
Follow Up Visit Note       Active Problems      1. Pre-op testing          Chief Complaint   No chief complaint on file.      History of Present Illness  This is a very nice 59-year-old gentleman with history of a high transsphincteric right anterior anal fistula who returns for follow-up today after undergoing creation of a rectal advancement flap in attempt to repair the anal fistula on 4/19/2024.    Unfortunately, patient states he has been experiencing worsening anorectal symptoms since my surgeries.  He is continuing to have drainage through a spot in his perineum.  He also states he passes flatus and stool through the opening.  He is not having any rectal pain.  No fevers or urinary symptoms.      Allergies  Arnulfo is allergic to calamari oil [squid oil], grass, and ragweed.    Past Medical / Surgical / Social / Family History    The past medical and past surgical history have been reviewed by me today.    Past Medical History:    Age-related nuclear cataract of both eyes    Coronary atherosclerosis    Esophageal reflux    Glaucoma    started on Latanoprost qhs OS after abnormal OCT result 10/31/15    Heart attack (HCC)    High blood pressure    High cholesterol    Hx of motion sickness    Lipid screening    Lumbar herniated disc    Physical Therapy     MANAN on CPAP    Pigmentary dispersion syndrome    Pigmentary glaucoma of both eyes, moderate stage    1/30/2020- pt had been lost to follow up since 2016 and came in with IOP of 27 OU and abnormal VF and OCT OS- and was restarted on Latanoprost OS and started Latanoprost OD due to OHT    PONV (postoperative nausea and vomiting)    Sleep apnea    LAST CPAP USE 2 YRS AGO    Visual impairment    readers     Past Surgical History:   Procedure Laterality Date    Cabg  09/2023    Colonoscopy N/A 09/15/2020    Procedure: COLONOSCOPY;  Surgeon: Bhavesh Beltran MD;  Location: [facility] ENDOSCOPY    Colonoscopy      Colonoscopy N/A 01/16/2024    Dr. Beltran; Colon  polyps    Colonoscopy N/A 01/16/2024    Procedure: COLONOSCOPY;  Surgeon: Bhavesh Beltran MD;  Location: [facility] ENDOSCOPY    Electrocardiogram, complete  02/25/2014    Scanned to media tab     Removal anal fistula,subcutaneous      Upper gi endoscopy,exam         The family history and social history have been reviewed by me today.    Family History   Problem Relation Age of Onset    Heart Disease Mother     Heart Disease Maternal Grandmother     Diabetes Neg     Glaucoma Neg     Macular degeneration Neg      Social History     Socioeconomic History    Marital status:    Tobacco Use    Smoking status: Never    Smokeless tobacco: Never   Vaping Use    Vaping status: Never Used   Substance and Sexual Activity    Alcohol use: Yes    Drug use: No   Other Topics Concern    Caffeine Concern Yes     Comment: Daily; coffee, 1 cup     Reaction to local anesthetic No      No current outpatient medications on file.     Review of Systems  A 10 point review of systems was performed and negative unless otherwise documented per HPI.     Physical Findings   BP (!) 173/99 (BP Location: Left arm)   Pulse 71   Temp 97.8 °F (36.6 °C) (Temporal)   Resp 18   Ht 70\"   Wt 202 lb (91.6 kg)   SpO2 96%   BMI 28.98 kg/m²   Physical Exam  Vitals and nursing note reviewed. Exam conducted with a chaperone present.   Constitutional:       General: He is not in acute distress.  HENT:      Head: Normocephalic and atraumatic.      Mouth/Throat:      Mouth: Mucous membranes are moist.   Cardiovascular:      Rate and Rhythm: Normal rate and regular rhythm.   Pulmonary:      Effort: Pulmonary effort is normal.   Abdominal:      General: There is no distension.      Palpations: Abdomen is soft.      Tenderness: There is no abdominal tenderness.   Genitourinary:     Comments: Patient examined in the prone jackknife position.  Patient declined chaperone.  There is a small area of raised granulation tissue in the perineum around 5 to 6 cm  from the anal verge just to the right of midline.  I was able to express a small amount of purulent drainage from this opening. Patient was tender with spreading of the anoderm.  External exam of the anus is normal.  Patient asked me not to perform digital rectal exam or anoscopy.  Musculoskeletal:         General: No deformity.   Skin:     General: Skin is warm and dry.   Neurological:      General: No focal deficit present.      Mental Status: He is alert.   Psychiatric:         Mood and Affect: Mood normal.          Assessment   1. Pre-op testing      This is a very nice 59-year-old gentleman with history of a high transsphincteric right anterior anal fistula who returns for follow-up today after undergoing creation of a rectal advancement flap in attempt to repair the anal fistula on 4/19/2024.    Unfortunately, patient states he has been experiencing worsening anorectal symptoms since my surgeries.  He is continuing to have drainage through a spot in his perineum.  He also states he passes flatus and stool through the opening.  He is not having any rectal pain.  No fevers or urinary symptoms.    There is a small area of raised granulation tissue in the perineum around 5 to 6 cm from the anal verge just to the right of midline.  I was able to express a small amount of purulent drainage from this opening. Patient was tender with spreading of the anoderm.  External exam of the anus is normal.  Patient asked me not to perform digital rectal exam or anoscopy.    Plan   I expressed sympathy to the patient in regards to his difficult time in the postoperative period.  Unfortunately, I am suspicious the patient has a persistent anal fistula given his persistent external opening with purulent drainage and his symptoms of passing flatus and stool through the external opening.    I offered patient repeat anal exam under anesthesia with anoscopy, possible fistulotomy, possible seton placement, possible excision of perineal  sinus depending on intraoperative findings.  The details of this procedure were discussed including the expected recovery time, risks, benefits and alternatives. I counseled patient that I will begin by probing the external opening in the perineum and injecting it with peroxide to confirm the presence of an internal opening and anal fistula. If an anal fistula is identified, I would likely perform a partial fistulotomy and seton placement. If no fistula is identified, I would excise the perianal sinus in hopes that it will heal by secondary intention. Specifically, I counseled patient he may need more procedures down the road if there is a seton placed.  Patient expressed understanding and wished to schedule surgery.  Surgery has been scheduled for 8/2/2024.     Orders Placed This Encounter   Procedures    Basic Metabolic Panel (8)    EKG 12 Lead       Imaging & Referrals   VITAL SIGNS  NURSING COMMUNICATION  PLACE PIV  ACTIVITY AS TOLERATED  HEIGHT AND WEIGHT  INITIATE ADULT PREOP PROPHYLACTIC ABX PROTOCOL  VERIFY INFORMED CONSENT  NPO  VITAL SIGNS - NOTIFY PHYSICIAN    Follow Up  No follow-ups on file.    Herman Jiménez MD

## 2024-08-02 NOTE — ANESTHESIA PREPROCEDURE EVALUATION
PRE-OP EVALUATION    Patient Name: Arnulfo Clark    Admit Diagnosis: Anal fistula [K60.3]    Pre-op Diagnosis: Anal fistula [K60.3]    ANAL EXAMINATION UNDER ANESTHESIA, POSSIBLE FISTULOTOMY, POSSIBLE SETON PLACEMENT    Anesthesia Procedure: ANAL EXAMINATION UNDER ANESTHESIA, POSSIBLE FISTULOTOMY, POSSIBLE SETON PLACEMENT    Surgeons and Role:     * Herman Jiménez MD - Primary    Pre-op vitals reviewed.  Temp: 97.8 °F (36.6 °C)  Pulse: 71  Resp: 18  BP: 173/99  SpO2: 96 %  Body mass index is 28.98 kg/m².    Current medications reviewed.  Hospital Medications:   [Transfer Hold] acetaminophen (Tylenol Extra Strength) tab 1,000 mg  1,000 mg Oral Once    [Transfer Hold] scopolamine (Transderm-Scop) 1 MG/3DAYS patch 1 patch  1 patch Transdermal Once    lactated ringers infusion   Intravenous Continuous    ceFAZolin (Ancef) 2g in 10mL IV syringe premix  2 g Intravenous Once    And    metRONIDAZOLE in sodium chloride 0.79% (Flagyl) 5 mg/mL IVPB premix 500 mg  500 mg Intravenous Once       Outpatient Medications:     Medications Prior to Admission   Medication Sig Dispense Refill Last Dose    latanoprost 0.005 % Ophthalmic Solution Place 1 drop into both eyes nightly. Instill 1 drop by ophthalmic route every night into both eyes 3 each 3 8/1/2024 at 2100    rosuvastatin 40 MG Oral Tab Take 1 tablet (40 mg total) by mouth nightly. 30 tablet 3 8/1/2024 at 2100    metoprolol tartrate 100 MG Oral Tab Take 1 tablet (100 mg total) by mouth 2 (two) times daily.   More than a month       Allergies: Calamari oil [squid oil], Grass, and Ragweed      Anesthesia Evaluation    Patient summary reviewed.    Anesthetic Complications  (+) history of anesthetic complications  History of: PONV       GI/Hepatic/Renal      (+) GERD                           Cardiovascular  Comment: Hx of CABG-stable since     EKG  Normal sinus rhythm   Prolonged QT interval or tu fusion, consider myocardial disease, electrolyte imbalance, or drug effects    Abnormal ECG   When compared with ECG of 13-FEB-2024 12:48,   No significant change was found   Confirmed by Tod Ponce ([id number]) on 7/24/2024 10:58:54 AM         ECG reviewed.  Exercise tolerance: good     MET: >4      (+) hypertension     (+) CAD                  (-) angina     (-) VEE  (-) orthopnea       Endo/Other                                  Pulmonary      (-) asthma         (-) shortness of breath     (+) sleep apnea       Neuro/Psych                              Patient had nausea with last anesthetic and bridges on lower rear teeth were dislodged.    No longer takes metoprolol        Past Surgical History:   Procedure Laterality Date    Cabg  09/2023    Colonoscopy N/A 09/15/2020    Procedure: COLONOSCOPY;  Surgeon: Bhavesh Beltran MD;  Location: [facility] ENDOSCOPY    Colonoscopy      Colonoscopy N/A 01/16/2024    Dr. Beltran; Colon polyps    Colonoscopy N/A 01/16/2024    Procedure: COLONOSCOPY;  Surgeon: Bhavesh Beltran MD;  Location: [facility] ENDOSCOPY    Electrocardiogram, complete  02/25/2014    Scanned to media tab     Removal anal fistula,subcutaneous      Upper gi endoscopy,exam       Social History     Socioeconomic History    Marital status:    Tobacco Use    Smoking status: Never    Smokeless tobacco: Never   Vaping Use    Vaping status: Never Used   Substance and Sexual Activity    Alcohol use: Yes    Drug use: No   Other Topics Concern    Caffeine Concern Yes     Comment: Daily; coffee, 1 cup     Reaction to local anesthetic No     History   Drug Use No     Available pre-op labs reviewed.     Lab Results   Component Value Date     07/24/2024    K 4.6 07/24/2024     07/24/2024    CO2 30.0 07/24/2024    BUN 16 07/24/2024    CREATSERUM 1.09 07/24/2024     (H) 07/24/2024    CA 9.6 07/24/2024            Airway      Mallampati: II  Mouth opening: >3 FB  TM distance: 4 - 6 cm  Neck ROM: full Cardiovascular    Cardiovascular exam normal.  Rhythm: regular  Rate:  normal     Dental    Dentition appears grossly intact         Pulmonary    Pulmonary exam normal.  Breath sounds clear to auscultation bilaterally.               Other findings              ASA: 3   Plan: general  NPO status verified and patient meets guidelines.    Post-procedure pain management plan discussed with surgeon and patient.    Comment: Discussed cardiac risk.  Plan/risks discussed with: patient                Present on Admission:  **None**

## 2024-08-02 NOTE — ANESTHESIA POSTPROCEDURE EVALUATION
[facility]    Arnulfo Clark Patient Status:  Hospital Outpatient Surgery   Age/Gender 59 year old male MRN [id number]   Location [facility] SURGERY Attending Herman Jiménez MD   Hosp Day # 0 PCP Cole Cary MD       Anesthesia Post-op Note    ANAL EXAMINATION UNDER ANESTHESIA, PARTIAL FISTULOTOMY, SETON PLACEMENT    Procedure Summary       Date: 08/02/24 Room / Location:  MAIN OR [address] [place] [address] MAIN OR    Anesthesia Start: 1009 Anesthesia Stop: 1119    Procedure: ANAL EXAMINATION UNDER ANESTHESIA, PARTIAL FISTULOTOMY, SETON PLACEMENT Diagnosis:       Anal fistula      (Anal fistula [K60.3])    Surgeons: Herman Jiménez MD Anesthesiologist: Demarcus Ruby DO    Anesthesia Type: general ASA Status: 3            Anesthesia Type: general    Vitals Value Taken Time   /73 08/02/24 1120   Temp 97.7 08/02/24 1120   Pulse 67 08/02/24 1120   Resp 16 08/02/24 1120   SpO2 97 08/02/24 1120       Patient Location: PACU    Anesthesia Type: general    Airway Patency: patent and extubated    Postop Pain Control: adequate    Mental Status: mildly sedated but able to meaningfully participate in the post-anesthesia evaluation    Nausea/Vomiting: none    Cardiopulmonary/Hydration status: stable euvolemic    Complications: no apparent anesthesia related complications    Postop vital signs: stable    Dental Exam: Unchanged from Preop    Patient to be discharged from PACU when criteria met.

## 2024-08-02 NOTE — DISCHARGE INSTRUCTIONS
Anal Surgery  Post-Surgical Instructions     Herman Jiménez MD         MEDICATIONS  You will be given a prescription for pain.  Take 1 tablet every 6 hours as needed.  Pain medications will ease your pain, but you should expect some incisional pain for about 7-10 days.  You may take acetaminophen (Tylenol) up to 650 mg every 6 hours as needed for mild-moderate pain. You may take ibuprofen (Motrin) up to 600 mg every 6 hours as needed for mild-moderate pain. Take narcotic pain medication as needed for severe pain per your prescription. Do not drive while taking narcotic pain medication. Many patients take a stool softener as narcotics are known to cause constipation. You should walk often, cough and take deep breaths.       DIET  You will begin a high fiber diet.  The easiest way to a high fiber diet is to take a fiber supplement.  An excellent supplement is plain, unflavored Metamucil.  You should take one tablespoon in 8 oz of water twice a day.  Ideally, you should take the supplement before breakfast and dinner.  You may experience some gas bloating for the first 2 weeks. This is normal and will go away as long as you keep taking the supplement.  Other supplements that can be taken include Per Courtney, Benefiber, Konsyl, or Citrucel. Continue to take the fiber supplement for 1 month.     In addition, it is a good idea go to the drugstore and a stool softener, such as docusate, and a gentle laxative such as MiraLAX.  Taking MiraLAX daily and stool softener 1-2 times a day may prevent you from getting overly constipated while on the narcotic drug.  Once you stop taking the prescription pain medication, you may stop taking the stool softener and laxative.        WOUND CARE   You will perform sitz baths two-three times a day for 3-4 weeks.  Sitz baths simply mean soaking your anus in a tub of warm-hot water for about 15 minutes.  Sitz baths will clean the anal wound as well as relax the anal sphincter muscles, which  will help minimize your pain.  Be careful around the anal wound, especially if you have stitches on the outside.  Gently pat your anus dry (never rub) or simply dry your anus with a blow-dryer set to cool/warm.   Do not soak your anus for more than 15 minutes.        ACTIVITY  After surgery, your driving reflexes will be slower, especially if you are taking pain medications.  Therefore, you are restricted from driving until after your follow-up visit and after you have stopped taking your prescription pain meds.  You may walk about the house, go shopping, or eat at a restaurant.  You may also climb stairs and exercise. You can sit on your wound, but keep in mind that the less you sit, the less pain you will have.     APPOINTMENT  Please call our office for an appointment in 2-4 weeks after surgery, unless otherwise instructed.  This will allow ample time for the swelling and soreness to resolve before your wound is examined.  There may be some bleeding from your wound.  This is normal.  If you begin bleeding heavily, have fevers, chills, or if you are concerned about your wound, please call us immediately at [phone].     Thank you for entrusting us with your care.

## 2024-08-26 ENCOUNTER — TELEPHONE (OUTPATIENT)
Facility: LOCATION | Age: 59
End: 2024-08-26

## 2024-08-26 NOTE — TELEPHONE ENCOUNTER
Spoke with patient who had surgery on 8/2/23 with Dr Jiménez.  Appointment made for 8/29/23 with Dr Jiménez.  Verbalized understanding.    Future Appointments   Date Time Provider Department Center   8/29/2024  2:45 PM [id number]

## 2024-08-26 NOTE — TELEPHONE ENCOUNTER
Patient calling stating that he was to be scheduled for a post op visit with Dr. Jiménez and was told he would see the appointment in his mychart.  He is calling as he doesn't see any appointments.  He doesn't know who he spoke with.  I tried to schedule his with one of our PA's but he refused and states he needs to see Dr. Jiménez right away.    Call back number is [phone].

## 2024-08-29 ENCOUNTER — OFFICE VISIT (OUTPATIENT)
Facility: LOCATION | Age: 59
End: 2024-08-29
Payer: MEDICAID

## 2024-08-29 VITALS — HEART RATE: 74 BPM | TEMPERATURE: 98 F

## 2024-08-29 DIAGNOSIS — K60.3 ANAL FISTULA: Primary | ICD-10-CM

## 2024-08-29 PROCEDURE — 99024 POSTOP FOLLOW-UP VISIT: CPT | Performed by: STUDENT IN AN ORGANIZED HEALTH CARE EDUCATION/TRAINING PROGRAM

## 2024-08-29 NOTE — PROGRESS NOTES
Follow Up Visit Note       Active Problems      1. Anal fistula          Chief Complaint   Chief Complaint   Patient presents with    Post-Op     PO 8/2 ANAL EXAMINATION UNDER ANESTHESIA, PARTIAL FISTULOTOMY, SETON PLACEMENT- mild pain          History of Present Illness  This is a very nice 59-year-old gentleman with history of a high transsphincteric right anterior anal fistula who underwent creation of a rectal advancement flap in attempt to repair the anal fistula on 4/19/2024.  Unfortunately, the flap failed and the fistula recurred.  Most recently, I took the patient to the operating room on 8/2/2024 and discovered a recurrent/persistent high transsphincteric anterior anal fistula.  External opening in the perineum just to the right of midline 5 cm from the anal verge.  Internal opening at the level of the dentate line just to the right of midline.  The fistula was rather deep and encompassed the majority of the sphincter complex.  I performed a partial fistulotomy and seton placement.  Patient returns for follow-up today.    Patient is a bit frustrated that he is still draining fecal material around his anal rectal region.  He is unsure if this is coming from his rectum or from the external opening of the fistula.  He believes there is a second opening near the seton that is causing some mild discomfort and drainage.  No fevers.  No incontinence.      Allergies  Arnulfo is allergic to calamari oil [squid oil], grass, and ragweed.    Past Medical / Surgical / Social / Family History    The past medical and past surgical history have been reviewed by me today.    Past Medical History:    Age-related nuclear cataract of both eyes    Coronary atherosclerosis    Esophageal reflux    Glaucoma    started on Latanoprost qhs OS after abnormal OCT result 10/31/15    Heart attack (HCC)    High blood pressure    High cholesterol    Hx of motion sickness    Lipid screening    Lumbar herniated disc    Physical Therapy     MANAN  on CPAP    Pigmentary dispersion syndrome    Pigmentary glaucoma of both eyes, moderate stage    1/30/2020- pt had been lost to follow up since 2016 and came in with IOP of 27 OU and abnormal VF and OCT OS- and was restarted on Latanoprost OS and started Latanoprost OD due to OHT    PONV (postoperative nausea and vomiting)    Sleep apnea    LAST CPAP USE 2 YRS AGO    Visual impairment    readers     Past Surgical History:   Procedure Laterality Date    Cabg  09/2023    Colonoscopy N/A 09/15/2020    Procedure: COLONOSCOPY;  Surgeon: Bhavesh Beltran MD;  Location: [facility] ENDOSCOPY    Colonoscopy      Colonoscopy N/A 01/16/2024    Dr. Beltran; Colon polyps    Colonoscopy N/A 01/16/2024    Procedure: COLONOSCOPY;  Surgeon: Bhavesh Beltran MD;  Location: [facility] ENDOSCOPY    Electrocardiogram, complete  02/25/2014    Scanned to media tab     Removal anal fistula,subcutaneous      Upper gi endoscopy,exam         The family history and social history have been reviewed by me today.    Family History   Problem Relation Age of Onset    Heart Disease Mother     Heart Disease Maternal Grandmother     Diabetes Neg     Glaucoma Neg     Macular degeneration Neg      Social History     Socioeconomic History    Marital status:    Tobacco Use    Smoking status: Never    Smokeless tobacco: Never   Vaping Use    Vaping status: Never Used   Substance and Sexual Activity    Alcohol use: Yes    Drug use: No   Other Topics Concern    Caffeine Concern Yes     Comment: Daily; coffee, 1 cup     Reaction to local anesthetic No        Current Outpatient Medications:     HYDROcodone-acetaminophen 5-325 MG Oral Tab, Take 1 tablet by mouth every 6 (six) hours as needed for Pain., Disp: 12 tablet, Rfl: 0    metoprolol tartrate 100 MG Oral Tab, Take 1 tablet (100 mg total) by mouth 2 (two) times daily., Disp: , Rfl:     latanoprost 0.005 % Ophthalmic Solution, Place 1 drop into both eyes nightly. Instill 1 drop by ophthalmic route  every night into both eyes, Disp: 3 each, Rfl: 3    rosuvastatin 40 MG Oral Tab, Take 1 tablet (40 mg total) by mouth nightly., Disp: 30 tablet, Rfl: 3     Review of Systems  A 10 point review of systems was performed and negative unless otherwise documented per HPI.     Physical Findings   Pulse 74   Temp 97.7 °F (36.5 °C) (Temporal)   Physical Exam  Vitals and nursing note reviewed. Exam conducted with a chaperone present.   Constitutional:       General: He is not in acute distress.  HENT:      Head: Normocephalic and atraumatic.      Mouth/Throat:      Mouth: Mucous membranes are moist.   Cardiovascular:      Rate and Rhythm: Normal rate and regular rhythm.   Pulmonary:      Effort: Pulmonary effort is normal.   Abdominal:      General: There is no distension.      Palpations: Abdomen is soft.      Tenderness: There is no abdominal tenderness.   Genitourinary:     Comments: Patient examined in the prone jackknife position.  There is an external opening in the perineum just to the right of midline with vessel loop seton in place.  This opening is around 4 cm from the anal verge.  There is a second opening in the perineum distal to this where I was able to express a small amount of purulent fluid.  There is a skin bridge between this more distal external opening in the external opening where the vessel loop seton exits.  Musculoskeletal:         General: No deformity.   Skin:     General: Skin is warm and dry.   Neurological:      General: No focal deficit present.      Mental Status: He is alert.   Psychiatric:         Mood and Affect: Mood normal.          Assessment   1. Anal fistula      This is a very nice 59-year-old gentleman with history of a high transsphincteric right anterior anal fistula who underwent creation of a rectal advancement flap in attempt to repair the anal fistula on 4/19/2024.  Unfortunately, the flap failed and the fistula recurred.  Most recently, I took the patient to the operating  room on 8/2/2024 and discovered a recurrent/persistent high transsphincteric anterior anal fistula.  External opening in the perineum just to the right of midline 5 cm from the anal verge.  Internal opening at the level of the dentate line just to the right of midline.  The fistula was rather deep and encompassed the majority of the sphincter complex.  I performed a partial fistulotomy and seton placement.  Patient returns for follow-up today.    Patient is a bit frustrated that he is still draining fecal material around his anal rectal region.  He is unsure if this is coming from his rectum or from the external opening of the fistula.  He believes there is a second opening near the seton that is causing some mild discomfort and drainage.  No fevers.  No incontinence.    There is an external opening in the perineum just to the right of midline with vessel loop seton in place.  This opening is around 4 cm from the anal verge.  There is a second opening in the perineum distal to this where I was able to express a small amount of purulent fluid.  There is a skin bridge between this more distal external opening in the external opening where the vessel loop seton exits.    Plan   I counseled patient that unfortunately it seems he re-developed a subcutaneous fistula tract.  This seems to be the source of his ongoing discomfort and drainage.  I recommend planning to go back the operating room for subcutaneous fistulotomy.  The details of this procedure were discussed including the expected recovery time, risks, benefits and alternatives.  I counseled patient that I would plan to leave the seton in place through the primary fistula tract and my hope would be to heal the external portion first.  He will need more surgery down the road to fix the primary fistula tract.  Patient expressed understanding and was agreeable to schedule surgery.  Surgery was scheduled for 9/27/2024.     No orders of the defined types were placed in  this encounter.      Imaging & Referrals   None    Follow Up  No follow-ups on file.    Herman Jiménez MD

## 2024-09-04 ENCOUNTER — TELEPHONE (OUTPATIENT)
Facility: LOCATION | Age: 59
End: 2024-09-04

## 2024-09-04 DIAGNOSIS — K60.3 ANAL FISTULA: Primary | ICD-10-CM

## 2024-09-04 NOTE — TELEPHONE ENCOUNTER
S/W PT he stated he would like a second opinion before going forward with sx.--- 9/27 is cancelled and  He is Scheduled with ROMINA in Clinic  on 10/28

## 2024-09-24 ENCOUNTER — NURSE TRIAGE (OUTPATIENT)
Dept: INTERNAL MEDICINE CLINIC | Facility: CLINIC | Age: 59
End: 2024-09-24

## 2024-09-24 ENCOUNTER — LAB ENCOUNTER (OUTPATIENT)
Dept: LAB | Age: 59
End: 2024-09-24
Attending: NURSE PRACTITIONER
Payer: MEDICAID

## 2024-09-24 DIAGNOSIS — I25.118 ATHSCL HEART DISEASE OF NATIVE COR ART W OTH ANG PCTRS (HCC): ICD-10-CM

## 2024-09-24 DIAGNOSIS — E78.00 PURE HYPERCHOLESTEROLEMIA: Primary | ICD-10-CM

## 2024-09-24 LAB
ALBUMIN SERPL-MCNC: 4.5 G/DL (ref 3.2–4.8)
ALBUMIN/GLOB SERPL: 1.6 {RATIO} (ref 1–2)
ALP LIVER SERPL-CCNC: 71 U/L
ALT SERPL-CCNC: 34 U/L
ANION GAP SERPL CALC-SCNC: 6 MMOL/L (ref 0–18)
AST SERPL-CCNC: 26 U/L (ref ?–34)
BILIRUB SERPL-MCNC: 0.6 MG/DL (ref 0.2–1.1)
BUN BLD-MCNC: 13 MG/DL (ref 9–23)
BUN/CREAT SERPL: 11.7 (ref 10–20)
CALCIUM BLD-MCNC: 9.5 MG/DL (ref 8.7–10.4)
CHLORIDE SERPL-SCNC: 105 MMOL/L (ref 98–112)
CHOLEST SERPL-MCNC: 140 MG/DL (ref ?–200)
CO2 SERPL-SCNC: 29 MMOL/L (ref 21–32)
CREAT BLD-MCNC: 1.11 MG/DL
EGFRCR SERPLBLD CKD-EPI 2021: 76 ML/MIN/1.73M2 (ref 60–?)
FASTING PATIENT LIPID ANSWER: YES
FASTING STATUS PATIENT QL REPORTED: YES
GLOBULIN PLAS-MCNC: 2.8 G/DL (ref 2–3.5)
GLUCOSE BLD-MCNC: 126 MG/DL (ref 70–99)
HDLC SERPL-MCNC: 28 MG/DL (ref 40–59)
LDLC SERPL CALC-MCNC: 84 MG/DL (ref ?–100)
NONHDLC SERPL-MCNC: 112 MG/DL (ref ?–130)
OSMOLALITY SERPL CALC.SUM OF ELEC: 292 MOSM/KG (ref 275–295)
POTASSIUM SERPL-SCNC: 4 MMOL/L (ref 3.5–5.1)
PROT SERPL-MCNC: 7.3 G/DL (ref 5.7–8.2)
SODIUM SERPL-SCNC: 140 MMOL/L (ref 136–145)
TRIGL SERPL-MCNC: 159 MG/DL (ref 30–149)
VLDLC SERPL CALC-MCNC: 25 MG/DL (ref 0–30)

## 2024-09-24 PROCEDURE — 80053 COMPREHEN METABOLIC PANEL: CPT

## 2024-09-24 PROCEDURE — 80061 LIPID PANEL: CPT

## 2024-09-24 PROCEDURE — 36415 COLL VENOUS BLD VENIPUNCTURE: CPT

## 2024-09-24 RX ORDER — PANTOPRAZOLE SODIUM 40 MG/1
40 TABLET, DELAYED RELEASE ORAL
Qty: 90 TABLET | Refills: 0 | Status: SHIPPED | OUTPATIENT
Start: 2024-09-24

## 2024-09-24 NOTE — TELEPHONE ENCOUNTER
Action Requested: Summary for Provider     []  Critical Lab, Recommendations Needed  [] Need Additional Advice  []   FYI    []   Need Orders  [x] Need Medications Sent to Pharmacy  []  Other     SUMMARY:  patient is calling as he tried to call his pharmacy for a refill for his Pantoprazole 40 mg once a day but because it has been a while that he has not had this medication he was inform to call our office. Patient stated that he is having heart burn as he has had this in the past. Patient stated that its heart burn as it comes up with some vomit. Patient stated that yesterday it was severe that he was not able to breath. Patient stated that he only uses the medication when needed. Dr. Cary do you approve refill request?   Patient stated that he is sure its heart burn and not his heart.    Reason for call: heart burn          Reason for Disposition   Patient says chest pain feels exactly the same as previously diagnosed 'heartburn' and describes burning in chest and accompanying sour taste in mouth    Protocols used: Chest Pain-A-[place]

## 2024-09-25 ENCOUNTER — TELEPHONE (OUTPATIENT)
Dept: INTERNAL MEDICINE CLINIC | Facility: CLINIC | Age: 59
End: 2024-09-25

## 2024-09-25 NOTE — TELEPHONE ENCOUNTER
He sees me once a year.  I would recommend he speak with cardiology about adjusting the medication since they ordered the test.

## 2024-09-25 NOTE — TELEPHONE ENCOUNTER
Patient states he had lipid panel done yesterday.  Patient is taking rosuvastatin 40mg.  Patient is asking for recommendations if he should continue the same dose or need to change. Patient needs a refill.  Patient informed  labs were ordered by cardiology, and to request if medications need adjustments.  Patient he only sees cardiology once every six months.  Please advise. Results below.    Component      Latest Ref Rng 9/24/2024   Cholesterol, Total      <200 mg/dL 140    HDL Cholesterol      40 - 59 mg/dL 28 (L)    Triglycerides      30 - 149 mg/dL 159 (H)    LDL Cholesterol Calc      <100 mg/dL 84    VLDL      0 - 30 mg/dL 25    NON-HDL CHOLESTEROL      <130 mg/dL 112    Patient Fasting for Lipid? Yes

## 2024-10-11 ENCOUNTER — TELEPHONE (OUTPATIENT)
Dept: INTERNAL MEDICINE CLINIC | Facility: CLINIC | Age: 59
End: 2024-10-11

## 2024-10-11 DIAGNOSIS — Z86.39 HX OF THYROID NODULE: Primary | ICD-10-CM

## 2024-10-11 NOTE — TELEPHONE ENCOUNTER
Patient notified and is already scheduled for below date and time     Future Appointments   Date Time Provider Department Center   10/14/2024  8:30 AM HND US RM1 [facility] US EM [facility]   10/17/2024  7:45 AM EH US RM 4 [facility]   10/28/2024 11:45 AM Herman Jiménez MD [facility] [id number]

## 2024-10-11 NOTE — TELEPHONE ENCOUNTER
Lenora has US guided thyroid biopsy scheduled on 10/17/24.     He was told that he needs to have Thyroid US within 6 months prior to biopsy and his last Thyroid US was 10/16/23    Pended for review. Marking as high because this is time sensitive. Patient states he delayed biopsy due to other rachell concerns but would like to address now.

## 2024-10-14 ENCOUNTER — HOSPITAL ENCOUNTER (OUTPATIENT)
Dept: ULTRASOUND IMAGING | Age: 59
Discharge: HOME | End: 2024-10-14
Attending: INTERNAL MEDICINE
Payer: MEDICAID

## 2024-10-14 DIAGNOSIS — Z86.39 HX OF THYROID NODULE: ICD-10-CM

## 2024-10-14 PROCEDURE — 76536 US EXAM OF HEAD AND NECK: CPT | Performed by: INTERNAL MEDICINE

## 2024-10-17 ENCOUNTER — HOSPITAL ENCOUNTER (OUTPATIENT)
Dept: ULTRASOUND IMAGING | Facility: HOSPITAL | Age: 59
Discharge: HOME | End: 2024-10-17
Attending: INTERNAL MEDICINE
Payer: MEDICAID

## 2024-10-17 DIAGNOSIS — E04.1 THYROID NODULE GREATER THAN OR EQUAL TO 1.5 CM IN DIAMETER INCIDENTALLY NOTED ON IMAGING STUDY: ICD-10-CM

## 2024-10-17 PROCEDURE — 88173 CYTOPATH EVAL FNA REPORT: CPT | Performed by: INTERNAL MEDICINE

## 2024-10-17 PROCEDURE — 10005 FNA BX W/US GDN 1ST LES: CPT | Performed by: INTERNAL MEDICINE

## 2024-10-28 ENCOUNTER — OFFICE VISIT (OUTPATIENT)
Facility: LOCATION | Age: 59
End: 2024-10-28
Payer: MEDICAID

## 2024-10-28 VITALS
DIASTOLIC BLOOD PRESSURE: 90 MMHG | TEMPERATURE: 98 F | OXYGEN SATURATION: 96 % | RESPIRATION RATE: 18 BRPM | SYSTOLIC BLOOD PRESSURE: 149 MMHG | HEART RATE: 62 BPM

## 2024-10-28 DIAGNOSIS — K60.30 ANAL FISTULA: Primary | ICD-10-CM

## 2024-10-28 RX ORDER — ASPIRIN 81 MG/1
TABLET ORAL
COMMUNITY
Start: 2024-08-26

## 2024-10-29 ENCOUNTER — OFFICE VISIT (OUTPATIENT)
Dept: INTERNAL MEDICINE CLINIC | Facility: CLINIC | Age: 59
End: 2024-10-29
Payer: MEDICAID

## 2024-10-29 VITALS
DIASTOLIC BLOOD PRESSURE: 78 MMHG | WEIGHT: 207 LBS | OXYGEN SATURATION: 95 % | BODY MASS INDEX: 29.63 KG/M2 | SYSTOLIC BLOOD PRESSURE: 146 MMHG | TEMPERATURE: 97 F | HEIGHT: 70 IN | HEART RATE: 83 BPM | RESPIRATION RATE: 18 BRPM

## 2024-10-29 DIAGNOSIS — N52.03 COMBINED ARTERIAL INSUFFICIENCY AND CORPORO-VENOUS OCCLUSIVE ERECTILE DYSFUNCTION: ICD-10-CM

## 2024-10-29 DIAGNOSIS — E04.1 THYROID NODULE: Primary | ICD-10-CM

## 2024-10-29 PROCEDURE — 99214 OFFICE O/P EST MOD 30 MIN: CPT | Performed by: INTERNAL MEDICINE

## 2024-10-29 RX ORDER — TADALAFIL 5 MG/1
5 TABLET ORAL
Qty: 5 TABLET | Refills: 0 | Status: SHIPPED | OUTPATIENT
Start: 2024-10-29

## 2024-10-29 NOTE — PROGRESS NOTES
Patient ID: Arnulfo Clark is a 59 year old male.  Chief Complaint   Patient presents with    Follow - Up        HISTORY OF PRESENT ILLNESS:   HPI  Patient presents for above.  Here for follow-up.    In 2023 was noted to have a greater than 3 cm nodule on his thyroid.  He ultimately did get this biopsied recently which came back negative.  He would like to know if he should be on any medications.  His mother also had a nodule and is on medications.  Most recent thyroid profile was normal.    History of erectile dysfunction.  Has never been on any medications.  Would like to try something.    Review of Systems  Ten point review of systems otherwise negative with the exception of HPI and assessment and plan.    MEDICAL HISTORY:     Past Medical History:    Age-related nuclear cataract of both eyes    Coronary atherosclerosis    Esophageal reflux    Glaucoma    started on Latanoprost qhs OS after abnormal OCT result 10/31/15    Heart attack (HCC)    High blood pressure    High cholesterol    Hx of motion sickness    Lipid screening    Lumbar herniated disc    Physical Therapy     MANAN on CPAP    Pigmentary dispersion syndrome    Pigmentary glaucoma of both eyes, moderate stage    1/30/2020- pt had been lost to follow up since 2016 and came in with IOP of 27 OU and abnormal VF and OCT OS- and was restarted on Latanoprost OS and started Latanoprost OD due to OHT    PONV (postoperative nausea and vomiting)    Sleep apnea    LAST CPAP USE 2 YRS AGO    Visual impairment    readers       Past Surgical History:   Procedure Laterality Date    Cabg  09/2023    Colonoscopy N/A 09/15/2020    Procedure: COLONOSCOPY;  Surgeon: Bhavesh Beltran MD;  Location: [facility] ENDOSCOPY    Colonoscopy      Colonoscopy N/A 01/16/2024    Dr. Beltran; Colon polyps    Colonoscopy N/A 01/16/2024    Procedure: COLONOSCOPY;  Surgeon: Bhavesh Beltran MD;  Location: [facility] ENDOSCOPY    Electrocardiogram, complete  02/25/2014    Scanned to  media tab     Other surgical history  08/02/2024    ANAL EXAMINATION UNDER ANESTHESIA, PARTIAL FISTULOTOMY, SETON PLACEMENT    Removal anal fistula,subcutaneous      Upper gi endoscopy,exam           Current Outpatient Medications:     tadalafil 5 MG Oral Tab, Take 1 tablet (5 mg total) by mouth daily as needed for Erectile Dysfunction., Disp: 5 tablet, Rfl: 0    aspirin 81 MG Oral Tab EC, aspirin 81 mg tablet,delayed release, [RxNorm: 749563], Disp: , Rfl:     pantoprazole 40 MG Oral Tab EC, Take 1 tablet (40 mg total) by mouth every morning before breakfast., Disp: 90 tablet, Rfl: 0    latanoprost 0.005 % Ophthalmic Solution, Place 1 drop into both eyes nightly. Instill 1 drop by ophthalmic route every night into both eyes, Disp: 3 each, Rfl: 3    rosuvastatin 40 MG Oral Tab, Take 1 tablet (40 mg total) by mouth nightly., Disp: 30 tablet, Rfl: 3    Allergies:Allergies[1]    Social History     Socioeconomic History    Marital status:      Spouse name: Not on file    Number of children: Not on file    Years of education: Not on file    Highest education level: Not on file   Occupational History    Not on file   Tobacco Use    Smoking status: Never    Smokeless tobacco: Never   Vaping Use    Vaping status: Never Used   Substance and Sexual Activity    Alcohol use: Yes     Alcohol/week: 1.0 standard drink of alcohol     Types: 1 Standard drinks or equivalent per week     Comment: OCASSIONAL    Drug use: No    Sexual activity: Not on file   Other Topics Concern     Service Not Asked    Blood Transfusions Not Asked    Caffeine Concern Yes     Comment: Daily; coffee, 1 cup     Occupational Exposure Not Asked    Hobby Hazards Not Asked    Sleep Concern Not Asked    Stress Concern Not Asked    Weight Concern Not Asked    Special Diet Not Asked    Back Care Not Asked    Exercise Not Asked    Bike Helmet Not Asked    Seat Belt Not Asked    Self-Exams Not Asked    Grew up on a farm Not Asked    History of  tanning Not Asked    Outdoor occupation Not Asked    Pt has a pacemaker Not Asked    Pt has a defibrillator Not Asked    Reaction to local anesthetic No   Social History Narrative    Not on file     Social Drivers of Health     Financial Resource Strain: Low Risk  (9/11/2023)    Financial Resource Strain     Difficulty of Paying Living Expenses: Not very hard     Med Affordability: No   Food Insecurity: Not on file   Transportation Needs: No Transportation Needs (9/11/2023)    Transportation Needs     Lack of Transportation: No   Physical Activity: Not on file   Stress: Not on file   Social Connections: Not on file   Housing Stability: Not on file           PHYSICAL EXAM:     Vitals:    10/29/24 1756   BP: 146/78   Pulse: 83   Resp: 18   Temp: 97.3 °F (36.3 °C)   TempSrc: Temporal   SpO2: 95%   Weight: 207 lb (93.9 kg)   Height: 5' 10\" (1.778 m)       Body mass index is 29.7 kg/m².    Physical Exam  Constitutional:       Appearance: Normal appearance.   Eyes:      General: No scleral icterus.  Cardiovascular:      Rate and Rhythm: Normal rate and regular rhythm.      Pulses: Normal pulses.      Heart sounds: Normal heart sounds. No murmur heard.  Pulmonary:      Effort: Pulmonary effort is normal. No respiratory distress.      Breath sounds: Normal breath sounds. No stridor. No wheezing or rhonchi.   Neurological:      Mental Status: He is alert.   Psychiatric:         Mood and Affect: Mood normal.         Behavior: Behavior normal.           ASSESSMENT/PLAN:   1. Thyroid nodule  Biopsy results reviewed and were negative.  No need for medications.  Will check thyroid profile at upcoming complete physical.    2. Combined arterial insufficiency and corporo-venous occlusive erectile dysfunction  tadalafil 5 MG Oral Tab; Take 1 tablet (5 mg total) by mouth daily as needed for Erectile Dysfunction.  Dispense: 5 tablet; Refill: 0    Return in about 4 weeks (around 11/26/2024) for Complete physical.    This note was  prepared using Dragon Medical voice recognition dictation software. As a result errors may occur. When identified these errors have been corrected. While every attempt is made to correct errors during dictation discrepancies may still exist.    Cole Cary MD  10/29/2024       [1]   Allergies  Allergen Reactions    Calamari Oil [Squid Oil] ITCHING and SWELLING    Grass Runny nose    Ragweed ITCHING

## 2024-10-30 ENCOUNTER — TELEPHONE (OUTPATIENT)
Dept: INTERNAL MEDICINE CLINIC | Facility: CLINIC | Age: 59
End: 2024-10-30

## 2024-10-31 NOTE — TELEPHONE ENCOUNTER
Let patient know medication was denied. he can either pay out of pocket or call his insurance company to find an equivalent medication that is covered.

## 2024-11-05 NOTE — PROGRESS NOTES
Follow Up Visit Note       Active Problems      1. Anal fistula          Chief Complaint   Chief Complaint   Patient presents with    Post-Op     PO 8/2 ANAL EXAMINATION UNDER ANESTHESIA, PARTIAL FISTULOTOMY, SETON PLACEMENT- discuss further treatment        History of Present Illness  This is a very nice 59-year-old gentleman with history of a high transsphincteric right anterior anal fistula who underwent creation of a rectal advancement flap in attempt to repair the anal fistula on 4/19/2024.  Unfortunately, the flap failed and the fistula recurred.  Most recently, I took the patient to the operating room on 8/2/2024 and discovered a recurrent/persistent high transsphincteric anterior anal fistula.  External opening in the perineum just to the right of midline 5 cm from the anal verge.  Internal opening at the level of the dentate line just to the right of midline.  The fistula was rather deep and encompassed the majority of the sphincter complex.  I performed a partial fistulotomy and seton placement.  Patient returns for follow-up today.    Patient continues to have perianal drainage and discomfort.  No fevers.  No incontinence.      Allergies  Arnulfo is allergic to calamari oil [squid oil], grass, and ragweed.    Past Medical / Surgical / Social / Family History    The past medical and past surgical history have been reviewed by me today.    Past Medical History:    Age-related nuclear cataract of both eyes    Coronary atherosclerosis    Esophageal reflux    Glaucoma    started on Latanoprost qhs OS after abnormal OCT result 10/31/15    Heart attack (HCC)    High blood pressure    High cholesterol    Hx of motion sickness    Lipid screening    Lumbar herniated disc    Physical Therapy     MANAN on CPAP    Pigmentary dispersion syndrome    Pigmentary glaucoma of both eyes, moderate stage    1/30/2020- pt had been lost to follow up since 2016 and came in with IOP of 27 OU and abnormal VF and OCT OS- and was  restarted on Latanoprost OS and started Latanoprost OD due to OHT    PONV (postoperative nausea and vomiting)    Sleep apnea    LAST CPAP USE 2 YRS AGO    Visual impairment    readers     Past Surgical History:   Procedure Laterality Date    Cabg  09/2023    Colonoscopy N/A 09/15/2020    Procedure: COLONOSCOPY;  Surgeon: Bhavesh Beltran MD;  Location: [facility] ENDOSCOPY    Colonoscopy      Colonoscopy N/A 01/16/2024    Dr. Beltran; Colon polyps    Colonoscopy N/A 01/16/2024    Procedure: COLONOSCOPY;  Surgeon: Bhavesh Beltran MD;  Location: [facility] ENDOSCOPY    Electrocardiogram, complete  02/25/2014    Scanned to media tab     Other surgical history  08/02/2024    ANAL EXAMINATION UNDER ANESTHESIA, PARTIAL FISTULOTOMY, SETON PLACEMENT    Removal anal fistula,subcutaneous      Upper gi endoscopy,exam         The family history and social history have been reviewed by me today.    Family History   Problem Relation Age of Onset    Heart Disease Mother     Heart Disease Maternal Grandmother     Diabetes Neg     Glaucoma Neg     Macular degeneration Neg      Social History     Socioeconomic History    Marital status:    Tobacco Use    Smoking status: Never    Smokeless tobacco: Never   Vaping Use    Vaping status: Never Used   Substance and Sexual Activity    Alcohol use: Yes     Alcohol/week: 1.0 standard drink of alcohol     Types: 1 Standard drinks or equivalent per week     Comment: OCASSIONAL    Drug use: No   Other Topics Concern    Caffeine Concern Yes     Comment: Daily; coffee, 1 cup     Reaction to local anesthetic No        Current Outpatient Medications:     aspirin 81 MG Oral Tab EC, aspirin 81 mg tablet,delayed release, [RxNorm: 961759], Disp: , Rfl:     tadalafil 5 MG Oral Tab, Take 1 tablet (5 mg total) by mouth daily as needed for Erectile Dysfunction., Disp: 5 tablet, Rfl: 0    pantoprazole 40 MG Oral Tab EC, Take 1 tablet (40 mg total) by mouth every morning before breakfast., Disp: 90  tablet, Rfl: 0    latanoprost 0.005 % Ophthalmic Solution, Place 1 drop into both eyes nightly. Instill 1 drop by ophthalmic route every night into both eyes, Disp: 3 each, Rfl: 3    rosuvastatin 40 MG Oral Tab, Take 1 tablet (40 mg total) by mouth nightly., Disp: 30 tablet, Rfl: 3     Review of Systems  A 10 point review of systems was performed and negative unless otherwise documented per HPI.     Physical Findings   /90   Pulse 62   Temp 97.8 °F (36.6 °C) (Temporal)   Resp 18   SpO2 96%   Physical Exam  Vitals and nursing note reviewed. Exam conducted with a chaperone present.   Constitutional:       General: He is not in acute distress.  HENT:      Head: Normocephalic and atraumatic.      Mouth/Throat:      Mouth: Mucous membranes are moist.   Cardiovascular:      Rate and Rhythm: Normal rate and regular rhythm.   Pulmonary:      Effort: Pulmonary effort is normal.   Abdominal:      General: There is no distension.      Palpations: Abdomen is soft.      Tenderness: There is no abdominal tenderness.   Genitourinary:     Comments: Patient examined in the prone jackknife position.  There is an external opening in the perineum just to the right of midline with vessel loop seton in place.  This opening is around 4 cm from the anal verge.  There is a second opening in the perineum distal to this where I was able to express a small amount of purulent fluid.  There is a skin bridge between this more distal external opening and the external opening where the vessel loop seton exits.  Musculoskeletal:         General: No deformity.   Skin:     General: Skin is warm and dry.   Neurological:      General: No focal deficit present.      Mental Status: He is alert.   Psychiatric:         Mood and Affect: Mood normal.          Assessment   1. Anal fistula      This is a very nice 59-year-old gentleman with history of a high transsphincteric right anterior anal fistula who underwent creation of a rectal advancement  flap in attempt to repair the anal fistula on 4/19/2024.  Unfortunately, the flap failed and the fistula recurred.  Most recently, I took the patient to the operating room on 8/2/2024 and discovered a recurrent/persistent high transsphincteric anterior anal fistula.  External opening in the perineum just to the right of midline 5 cm from the anal verge.  Internal opening at the level of the dentate line just to the right of midline.  The fistula was rather deep and encompassed the majority of the sphincter complex.  I performed a partial fistulotomy and seton placement.  Patient returns for follow-up today.    Patient continues to have perianal drainage and discomfort.  No fevers.  No incontinence.    There is an external opening in the perineum just to the right of midline with vessel loop seton in place.  This opening is around 4 cm from the anal verge.  There is a second opening in the perineum distal to this where I was able to express a small amount of purulent fluid.  There is a skin bridge between this more distal external opening and the external opening where the vessel loop seton exits.    Plan   Patient was interested in hearing his options for definitive fistula repair.  I do not think the patient would be a good candidate for fistulotomy as the fistula tract is quite deep and there is a large amount of overlying sphincter muscle.  I also do not think the patient is a good candidate for redo rectal advancement flap.    Therefore, I think the next best reasonable treatment option would be to attempt ligation of the intersphincteric fistula tract or LIFT procedure.  The details of the surgery were discussed including the expected recovery time, risks, benefits and alternatives.  Patient expressed understanding and was agreeable to schedule surgery with me.  This has been scheduled for 1/17/20[facility] at [facility].    In the meantime, continue local wound care and sitz bath's as needed.     No orders of the  defined types were placed in this encounter.      Imaging & Referrals   None    Follow Up  No follow-ups on file.    Herman Jiménez MD

## 2024-11-15 ENCOUNTER — LAB ENCOUNTER (OUTPATIENT)
Dept: LAB | Age: 59
End: 2024-11-15
Attending: INTERNAL MEDICINE
Payer: MEDICAID

## 2024-11-15 ENCOUNTER — OFFICE VISIT (OUTPATIENT)
Dept: INTERNAL MEDICINE CLINIC | Facility: CLINIC | Age: 59
End: 2024-11-15
Payer: MEDICAID

## 2024-11-15 VITALS
OXYGEN SATURATION: 99 % | HEART RATE: 84 BPM | SYSTOLIC BLOOD PRESSURE: 138 MMHG | BODY MASS INDEX: 29.78 KG/M2 | TEMPERATURE: 98 F | WEIGHT: 208 LBS | DIASTOLIC BLOOD PRESSURE: 82 MMHG | RESPIRATION RATE: 18 BRPM | HEIGHT: 70 IN

## 2024-11-15 DIAGNOSIS — K60.30 ANAL FISTULA: ICD-10-CM

## 2024-11-15 DIAGNOSIS — Z12.11 COLON CANCER SCREENING: ICD-10-CM

## 2024-11-15 DIAGNOSIS — Z95.1 HISTORY OF CORONARY ARTERY BYPASS GRAFT: ICD-10-CM

## 2024-11-15 DIAGNOSIS — R73.01 IMPAIRED FASTING GLUCOSE: ICD-10-CM

## 2024-11-15 DIAGNOSIS — I25.118 CORONARY ARTERY DISEASE OF NATIVE ARTERY OF NATIVE HEART WITH STABLE ANGINA PECTORIS (HCC): Chronic | ICD-10-CM

## 2024-11-15 DIAGNOSIS — I10 PRIMARY HYPERTENSION: ICD-10-CM

## 2024-11-15 DIAGNOSIS — E04.1 THYROID NODULE: ICD-10-CM

## 2024-11-15 DIAGNOSIS — E78.00 HYPERCHOLESTEROLEMIA: ICD-10-CM

## 2024-11-15 DIAGNOSIS — Z00.00 ANNUAL PHYSICAL EXAM: Primary | ICD-10-CM

## 2024-11-15 DIAGNOSIS — Z00.00 ANNUAL PHYSICAL EXAM: ICD-10-CM

## 2024-11-15 DIAGNOSIS — G47.33 OSA ON CPAP: ICD-10-CM

## 2024-11-15 PROBLEM — U07.1 COVID-19: Status: RESOLVED | Noted: 2024-02-27 | Resolved: 2024-11-15

## 2024-11-15 LAB
BASOPHILS # BLD AUTO: 0.04 X10(3) UL (ref 0–0.2)
BASOPHILS NFR BLD AUTO: 0.5 %
CREAT UR-SCNC: 57.2 MG/DL
DEPRECATED RDW RBC AUTO: 37.7 FL (ref 35.1–46.3)
EOSINOPHIL # BLD AUTO: 0.09 X10(3) UL (ref 0–0.7)
EOSINOPHIL NFR BLD AUTO: 1 %
ERYTHROCYTE [DISTWIDTH] IN BLOOD BY AUTOMATED COUNT: 11.7 % (ref 11–15)
EST. AVERAGE GLUCOSE BLD GHB EST-MCNC: 140 MG/DL (ref 68–126)
HBA1C MFR BLD: 6.5 % (ref ?–5.7)
HCT VFR BLD AUTO: 47.8 %
HGB BLD-MCNC: 16.4 G/DL
IMM GRANULOCYTES # BLD AUTO: 0.03 X10(3) UL (ref 0–1)
IMM GRANULOCYTES NFR BLD: 0.3 %
LYMPHOCYTES # BLD AUTO: 1.31 X10(3) UL (ref 1–4)
LYMPHOCYTES NFR BLD AUTO: 14.8 %
MCH RBC QN AUTO: 29.9 PG (ref 26–34)
MCHC RBC AUTO-ENTMCNC: 34.3 G/DL (ref 31–37)
MCV RBC AUTO: 87.1 FL
MICROALBUMIN UR-MCNC: 0.3 MG/DL
MICROALBUMIN/CREAT 24H UR-RTO: 5.2 UG/MG (ref ?–30)
MONOCYTES # BLD AUTO: 0.54 X10(3) UL (ref 0.1–1)
MONOCYTES NFR BLD AUTO: 6.1 %
NEUTROPHILS # BLD AUTO: 6.83 X10 (3) UL (ref 1.5–7.7)
NEUTROPHILS # BLD AUTO: 6.83 X10(3) UL (ref 1.5–7.7)
NEUTROPHILS NFR BLD AUTO: 77.3 %
PLATELET # BLD AUTO: 271 10(3)UL (ref 150–450)
PSA SERPL-MCNC: 1 NG/ML (ref ?–4)
RBC # BLD AUTO: 5.49 X10(6)UL
TSI SER-ACNC: 0.79 UIU/ML (ref 0.55–4.78)
WBC # BLD AUTO: 8.8 X10(3) UL (ref 4–11)

## 2024-11-15 PROCEDURE — 82043 UR ALBUMIN QUANTITATIVE: CPT

## 2024-11-15 PROCEDURE — 85025 COMPLETE CBC W/AUTO DIFF WBC: CPT

## 2024-11-15 PROCEDURE — 83036 HEMOGLOBIN GLYCOSYLATED A1C: CPT

## 2024-11-15 PROCEDURE — 36415 COLL VENOUS BLD VENIPUNCTURE: CPT

## 2024-11-15 PROCEDURE — 82570 ASSAY OF URINE CREATININE: CPT

## 2024-11-15 PROCEDURE — 84153 ASSAY OF PSA TOTAL: CPT

## 2024-11-15 PROCEDURE — 84443 ASSAY THYROID STIM HORMONE: CPT

## 2024-11-15 RX ORDER — EZETIMIBE 10 MG/1
10 TABLET ORAL NIGHTLY
COMMUNITY

## 2024-11-15 RX ORDER — CLOPIDOGREL BISULFATE 75 MG/1
75 TABLET ORAL DAILY
COMMUNITY

## 2024-11-15 NOTE — PATIENT INSTRUCTIONS
Prevention Guidelines, Men Ages 50 to 64  Screening tests and vaccines are an important part of managing your health. A screening test is done to find possible disorders or diseases in people who don't have any symptoms. The goal is to find a disease early so lifestyle changes can be made and you can be watched more closely to reduce the risk of disease, or to detect it early enough to treat it most effectively. Screening tests are not considered diagnostic, but are used to determine if more testing is needed. Health counseling is essential, too. Below are guidelines for these, for men ages 50 to 64. Talk with your healthcare provider to make sure you’re up-to-date on what you need.  Screening Who needs it How often   Alcohol misuse All men in this age group At routine exams   Blood pressure All men in this age group Yearly checkup if your blood pressure is normal  Normal blood pressure is less than 120/80 mm Hg  If your blood pressure reading is higher than normal, follow the advice of your healthcare provider      Colorectal cancer All men in this age group Flexible sigmoidoscopy every 5 years, or colonoscopy every 10 years, or double-contrast barium enema every 5 years; yearly fecal occult blood test or fecal immunochemical test; or a stool DNA test as often as your healthcare provider advises; talk with your healthcare provider about which tests are best for you   Depression All men in this age group At routine exams   Type 2 diabetes or prediabetes All men beginning at age 45 and men without symptoms at any age who are overweight or obese and have 1 or more other risk factors for diabetes At least every 3 years (yearly if your blood sugar has already begun to rise)   Type 2 diabetes All men with prediabetes Every year   Hepatitis C Men at increased risk for infection - talk with your healthcare provider At routine exams. All men ages 50 to 70 should be tested at least once for hepatitis C.   High cholesterol or  triglycerides All men in this age group At least every 5 years   HIV Men at increased risk for infection - talk with your healthcare provider At routine exams   Lung cancer Adults age 55 to 80 who have smoked Yearly screening in smokers with 30 pack-year history of smoking or who quit within 15 years   Obesity All men in this age group At routine exams   Prostate cancer Starting at age 45, talk to healthcare provider about risks and benefits of digital rectal exam (GARRICK) and prostate-specific antigen (PSA) screening1 At routine exams   Syphilis Men at increased risk for infection - talk with your healthcare provider At routine exams   Tuberculosis Men at increased risk for infection - talk with your healthcare provider Ask your healthcare provider   Vision All men in this age group Ask your healthcare provider   Vaccine Who needs it How often   Chickenpox (varicella) All men in this age group who have no record of this infection or vaccine 2 doses; second dose should be given at least 4 weeks after the first dose   Hepatitis A Men at increased risk for infection - talk with your healthcare provider 2 doses given at least 6 months apart   Hepatitis B Men at increased risk for infection - talk with your healthcare provider 3 doses over 6 months; second dose should be given 1 month after the first dose; the third dose should be given at least 2 months after the second dose and at least 4 months after the first dose   Haemophilus influenzae Type B (HIB) Men at increased risk for infection - talk with your healthcare provider 1 to 3 doses   Influenza (flu) All men in this age group Once a year   Measles, mumps, rubella (MMR) Men in this age group through their late 50s who have no record of these infections or vaccines 1 or 2 doses; ask your healthcare provider   Meningococcal Men at increased risk for infection - talk with your healthcare provider 1 or more doses   Pneumococcal conjugate vaccine (PCV13) and pneumococcal  polysaccharide vaccine (PPSV23) Men at increased risk for infection - talk with your healthcare provider PCV13: 1 dose ages 19 to 65 (protects against 13 types of pneumococcal bacteria)     PPSV23: 1 to 2 doses through age 64, or 1 dose at 65 or older (protects against 23 types of pneumococcal bacteria)      Tetanus/diphtheria/  pertussis (Td/Tdap) booster All men in this age group Td every 10 years, or a one-time dose of Tdap instead of a Td booster after age 18, then Td every 10 years   Zoster All men ages 60 and older 1 dose   Counseling Who needs it How often   Diet and exercise Men who are overweight or obese When diagnosed, and then at routine exams   Sexually transmitted infection prevention Men at increased risk for infection - talk with your healthcare provider At routine exams   Use of daily aspirin Men in this age group at risk for cardiovascular health problems At routine exams   Use of tobacco and the health effects it can cause All men in this age group Every visit   [address] [place] [address] [facility]  Date Last Reviewed: 2/1/2017  © 2023-0411 [employer]. [address] [place] [address], [place] [address]. All rights reserved. This information is not intended as a substitute for professional medical care. Always follow your healthcare professional's instructions.

## 2024-11-16 NOTE — PROGRESS NOTES
Patient ID: Arnulfo Clark is a 59 year old male.  Chief Complaint   Patient presents with    Physical    Dizziness        HISTORY OF PRESENT ILLNESS:   HPI  Patient presents for above.  Here for his complete physical.    History of hypertension not currently on medications.  Well controlled.    History of hypercholesterolemia on dual therapy.  Has a history of coronary bypass surgery in the past.  Recently had a lipid panel done by his cardiologist and his LDL was still greater than 70.  Zetia was added to his regimen.    As above has a history of coronary artery bypass surgery in 2023.  Follows with cardiology.  No chest pain or shortness of breath with activities.  He remains on aspirin, Plavix, and statin.    Recently found to have a large thyroid nodule.  He underwent biopsy and this was negative for malignancy.    History of anal fistula.  He will be seeing surgery for possible removal.    History of impaired fasting glucose.  A1c has been borderline elevated.  Not currently on any medications.    History of sleep apnea on CPAP.  This works well for him.    Had colonoscopy in 2024 with repeat to be done in 2027.    Review of Systems  Ten point review of systems otherwise negative with the exception of HPI and assessment and plan.    MEDICAL HISTORY:     Past Medical History:    Age-related nuclear cataract of both eyes    Coronary atherosclerosis    Esophageal reflux    Glaucoma    started on Latanoprost qhs OS after abnormal OCT result 10/31/15    Heart attack (HCC)    High blood pressure    High cholesterol    Hx of motion sickness    Lipid screening    Lumbar herniated disc    Physical Therapy     MANAN on CPAP    Pigmentary dispersion syndrome    Pigmentary glaucoma of both eyes, moderate stage    1/30/2020- pt had been lost to follow up since 2016 and came in with IOP of 27 OU and abnormal VF and OCT OS- and was restarted on Latanoprost OS and started Latanoprost OD due to OHT    PONV (postoperative  nausea and vomiting)    Sleep apnea    LAST CPAP USE 2 YRS AGO    Visual impairment    readers       Past Surgical History:   Procedure Laterality Date    Cabg  09/2023    Colonoscopy N/A 09/15/2020    Procedure: COLONOSCOPY;  Surgeon: Bhavesh Beltran MD;  Location: [facility] ENDOSCOPY    Colonoscopy      Colonoscopy N/A 01/16/2024    Dr. Beltran; Colon polyps    Colonoscopy N/A 01/16/2024    Procedure: COLONOSCOPY;  Surgeon: Bhavesh Beltran MD;  Location: [facility] ENDOSCOPY    Electrocardiogram, complete  02/25/2014    Scanned to media tab     Other surgical history  08/02/2024    ANAL EXAMINATION UNDER ANESTHESIA, PARTIAL FISTULOTOMY, SETON PLACEMENT    Removal anal fistula,subcutaneous      Upper gi endoscopy,exam           Current Outpatient Medications:     clopidogrel 75 MG Oral Tab, Take 1 tablet (75 mg total) by mouth daily., Disp: , Rfl:     ezetimibe 10 MG Oral Tab, Take 1 tablet (10 mg total) by mouth nightly., Disp: , Rfl:     tadalafil 5 MG Oral Tab, Take 1 tablet (5 mg total) by mouth daily as needed for Erectile Dysfunction., Disp: 5 tablet, Rfl: 0    aspirin 81 MG Oral Tab EC, aspirin 81 mg tablet,delayed release, [RxNorm: 803632], Disp: , Rfl:     pantoprazole 40 MG Oral Tab EC, Take 1 tablet (40 mg total) by mouth every morning before breakfast., Disp: 90 tablet, Rfl: 0    latanoprost 0.005 % Ophthalmic Solution, Place 1 drop into both eyes nightly. Instill 1 drop by ophthalmic route every night into both eyes, Disp: 3 each, Rfl: 3    rosuvastatin 40 MG Oral Tab, Take 1 tablet (40 mg total) by mouth nightly., Disp: 30 tablet, Rfl: 3    Allergies:Allergies[1]    Social History     Socioeconomic History    Marital status:      Spouse name: Not on file    Number of children: Not on file    Years of education: Not on file    Highest education level: Not on file   Occupational History    Not on file   Tobacco Use    Smoking status: Never    Smokeless tobacco: Never   Vaping Use    Vaping  status: Never Used   Substance and Sexual Activity    Alcohol use: Yes     Alcohol/week: 1.0 standard drink of alcohol     Types: 1 Standard drinks or equivalent per week     Comment: OCASSIONAL    Drug use: No    Sexual activity: Not Currently   Other Topics Concern     Service Not Asked    Blood Transfusions Not Asked    Caffeine Concern Yes     Comment: Daily; coffee, 1 cup     Occupational Exposure Not Asked    Hobby Hazards Not Asked    Sleep Concern Not Asked    Stress Concern Not Asked    Weight Concern Not Asked    Special Diet Not Asked    Back Care Not Asked    Exercise Not Asked    Bike Helmet Not Asked    Seat Belt Not Asked    Self-Exams Not Asked    Grew up on a farm Not Asked    History of tanning Not Asked    Outdoor occupation Not Asked    Pt has a pacemaker Not Asked    Pt has a defibrillator Not Asked    Reaction to local anesthetic No   Social History Narrative    Not on file     Social Drivers of Health     Financial Resource Strain: Low Risk  (9/11/2023)    Financial Resource Strain     Difficulty of Paying Living Expenses: Not very hard     Med Affordability: No   Food Insecurity: Not on file   Transportation Needs: No Transportation Needs (9/11/2023)    Transportation Needs     Lack of Transportation: No   Physical Activity: Not on file   Stress: Not on file   Social Connections: Not on file   Housing Stability: Not on file           PHYSICAL EXAM:     Vitals:    11/15/24 1301   BP: 138/82   Pulse: 84   Resp: 18   Temp: 97.7 °F (36.5 °C)   TempSrc: Temporal   SpO2: 99%   Weight: 208 lb (94.3 kg)   Height: 5' 10\" (1.778 m)       Body mass index is 29.84 kg/m².    Physical Exam  Constitutional:       Appearance: Normal appearance. He is well-developed.   HENT:      Head: Normocephalic.      Right Ear: Tympanic membrane, ear canal and external ear normal. There is no impacted cerumen.      Left Ear: Tympanic membrane, ear canal and external ear normal. There is no impacted cerumen.    Eyes:      General: No scleral icterus.     Pupils: Pupils are equal, round, and reactive to light.   Neck:      Vascular: No JVD.   Cardiovascular:      Rate and Rhythm: Normal rate and regular rhythm.      Pulses: Normal pulses.      Heart sounds: Normal heart sounds. No murmur heard.  Pulmonary:      Effort: Pulmonary effort is normal. No respiratory distress.      Breath sounds: Normal breath sounds. No stridor. No wheezing, rhonchi or rales.   Chest:      Chest wall: No tenderness.   Abdominal:      General: Abdomen is flat. Bowel sounds are normal. There is no distension.      Palpations: Abdomen is soft.      Tenderness: There is no abdominal tenderness. There is no guarding or rebound.   Musculoskeletal:         General: Normal range of motion.      Cervical back: Normal range of motion.   Lymphadenopathy:      Cervical: No cervical adenopathy.   Skin:     General: Skin is warm.   Neurological:      General: No focal deficit present.      Mental Status: He is alert.      Cranial Nerves: No cranial nerve deficit.   Psychiatric:         Mood and Affect: Mood normal.         Behavior: Behavior normal.       Lab Results   Component Value Date     (H) 09/24/2024    BUN 13 09/24/2024    BUNCREA 11.7 09/24/2024    CREATSERUM 1.11 09/24/2024    ANIONGAP 6 09/24/2024    GFRNAA 88 08/02/2022    GFRAA 102 08/02/2022    CA 9.5 09/24/2024    OSMOCALC 292 09/24/2024    ALKPHO 71 09/24/2024    AST 26 09/24/2024    ALT 34 09/24/2024    ALKPHOS 49 04/29/2016    BILT 0.6 09/24/2024    TP 7.3 09/24/2024    ALB 4.5 09/24/2024    GLOBULIN 2.8 09/24/2024    AGRATIO 1.4 04/29/2016     09/24/2024    K 4.0 09/24/2024     09/24/2024    CO2 29.0 09/24/2024     Lab Results   Component Value Date    CHOLEST 140 09/24/2024    TRIG 159 (H) 09/24/2024    HDL 28 (L) 09/24/2024    LDL 84 09/24/2024    VLDL 25 09/24/2024    NONHDLC 112 09/24/2024    CALCNONHDL 201 (H) 04/29/2016           ASSESSMENT/PLAN:   1. Annual  physical exam  CBC With Differential With Platelet; Future  TSH W Reflex To Free T4; Future  PSA Total, Diagnostic; Future    2. Primary hypertension  Microalb/Creat Ratio, Random Urine; Future  Well controlled.    3. Hypercholesterolemia  Most recent lipid panel as above.  Continue dual therapy.  Goal of LDL less than 70.    4. History of coronary artery bypass graft  Stable.  Follow-up with cardiology.    5. Coronary artery disease of native artery of native heart with stable angina pectoris (HCC)  As per #4.    6. Thyroid nodule  TSH W Reflex To Free T4; Future  Recent biopsy was negative.    7. Anal fistula  Follow-up with surgery.    8. Impaired fasting glucose  Hemoglobin A1C; Future    9. MANAN on CPAP  Continue CPAP.    10. Colon cancer screening  Repeat colonoscopy in 2027.    Return in about 6 months (around 5/15/2025) for Routine Follow-Up.    This note was prepared using Dragon Medical voice recognition dictation software. As a result errors may occur. When identified these errors have been corrected. While every attempt is made to correct errors during dictation discrepancies may still exist.    Cole Cary MD  11/15/2024         [1]   Allergies  Allergen Reactions    Calamari Oil [Squid Oil] ITCHING and SWELLING    Grass Runny nose    Ragweed ITCHING

## 2024-12-01 ENCOUNTER — APPOINTMENT (OUTPATIENT)
Dept: CT IMAGING | Facility: HOSPITAL | Age: 59
End: 2024-12-01
Attending: EMERGENCY MEDICINE
Payer: COMMERCIAL

## 2024-12-01 ENCOUNTER — APPOINTMENT (OUTPATIENT)
Dept: GENERAL RADIOLOGY | Facility: HOSPITAL | Age: 59
End: 2024-12-01
Attending: EMERGENCY MEDICINE
Payer: COMMERCIAL

## 2024-12-01 ENCOUNTER — HOSPITAL ENCOUNTER (EMERGENCY)
Facility: HOSPITAL | Age: 59
Discharge: HOME | End: 2024-12-01
Attending: EMERGENCY MEDICINE
Payer: COMMERCIAL

## 2024-12-01 VITALS
OXYGEN SATURATION: 95 % | SYSTOLIC BLOOD PRESSURE: 156 MMHG | HEART RATE: 85 BPM | DIASTOLIC BLOOD PRESSURE: 91 MMHG | TEMPERATURE: 98 F | RESPIRATION RATE: 20 BRPM

## 2024-12-01 DIAGNOSIS — S09.90XA INJURY OF HEAD, INITIAL ENCOUNTER: Primary | ICD-10-CM
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
## 2024-12-01 DIAGNOSIS — S13.9XXA NECK SPRAIN, INITIAL ENCOUNTER: ICD-10-CM

## 2024-12-01 DIAGNOSIS — S01.01XA LACERATION OF SCALP, INITIAL ENCOUNTER: ICD-10-CM

## 2024-12-01 DIAGNOSIS — S63.502A SPRAIN OF LEFT WRIST, INITIAL ENCOUNTER: ICD-10-CM

## 2024-12-01 PROCEDURE — 99284 EMERGENCY DEPT VISIT MOD MDM: CPT

## 2024-12-01 PROCEDURE — S0119 ONDANSETRON 4 MG: HCPCS | Performed by: EMERGENCY MEDICINE

## 2024-12-01 PROCEDURE — 12001 RPR S/N/AX/GEN/TRNK 2.5CM/<: CPT

## 2024-12-01 PROCEDURE — 72125 CT NECK SPINE W/O DYE: CPT | Performed by: EMERGENCY MEDICINE

## 2024-12-01 PROCEDURE — 70450 CT HEAD/BRAIN W/O DYE: CPT | Performed by: EMERGENCY MEDICINE

## 2024-12-01 PROCEDURE — 73110 X-RAY EXAM OF WRIST: CPT | Performed by: EMERGENCY MEDICINE

## 2024-12-01 RX ORDER — ONDANSETRON 4 MG/1
4 TABLET, ORALLY DISINTEGRATING ORAL ONCE
Status: COMPLETED | OUTPATIENT
Start: 2024-12-01 | End: 2024-12-01

## 2024-12-01 RX ORDER — LIDOCAINE HCL/EPINEPHRINE/PF 2%-1:200K
20 VIAL (ML) INJECTION ONCE
Status: COMPLETED | OUTPATIENT
Start: 2024-12-01 | End: 2024-12-01
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
## 2024-12-02 NOTE — DISCHARGE INSTRUCTIONS
Staples removed in 10 days.  Tylenol and ibuprofen for pain.  Follow-up with your doctor to have the staples removed and your blood pressure rechecked.  Follow-up with Dr. Yenifer Betancourt to have your wrist reevaluated.  Wear the splint for comfort and support until you follow-up with orthopedics.  Read all instructions

## 2025-01-03 ENCOUNTER — TELEPHONE (OUTPATIENT)
Facility: LOCATION | Age: 60
End: 2025-01-03

## 2025-01-03 NOTE — TELEPHONE ENCOUNTER
INEZ WHALEY Patient  Member ID  [id number]    Date of Birth  1965-05-10    Gender  Male    Transaction Type  Outpatient Authorization    Organization  [facility]    Payer  [facility] logo     Certificate Information  Reference Number  [id number]    Status  NO ACTION REQUIRED    Message  Requested Service does not require preauthorization. We would strongly encourage you to check benefits for this service.    Member Information  Patient Name  INEZ WHALEY    Patient Date of Birth  1965-05-10    Patient Gender  Male    Member ID  [id number]    Relationship to Subscriber  Self    Subscriber Name  INEZ WHALEY    Requesting Provider     Name  STEFFENLEIGHTONDOROTHY JOSE R    NPI  [id number]    Tax Id  [id number]    Specialty  [id number]    Provider Role  Provider    Address  [address] [place] [address]    Phone  [phone]    Fax  [phone]    Contact Name  AIDA CESAR    Service Information  Service Type  2 - Surgical    Place of Service  22 - [facility]    Service From - To Date  2025-01-17 - 2025-02-28    Level of Service  Elective    Diagnosis Code 1   - Anal fistula unspecified    Procedure Code 1 (CPT/HCPCS)  10335 - SURG DX EXAM ANORECTAL    Quantity  1 Units    Status  NO ACTION REQUIRED    Procedure Code 2 (CPT/HCPCS)  74117 - REMOVE ANAL FIST INTER    Quantity  1 Units    Status  NO ACTION REQUIRED

## 2025-01-06 NOTE — TELEPHONE ENCOUNTER
Received a call from the patient who reports Dr. Julien Garrett needs to call [phone] at [address] [place] [address] to perform a Peer to Peer review to see if the sleep study titration can be approved. Reference # [id number]. Route to provider for review.  Patient is requesting Subjective   Patient ID: Hilaire J Kiffer is a 68 y.o. male who presents for Sick Visit (Cough and congestion /Sx's over a week /Productive cough - yellow / green phlegm /Pinched nerve right arm- difficulty lifting it /Covid tested at home - negative - last Monday ).    Last weekend  Not sure what he did to right arm  Pinched nerve?  Weak?  Can't pick it up well  Having to manipulate body to lift or move  Cannot stretch it out  Not painful    Has bad cough  12/27  Starting to slowly improve  Night time cough is worse  Can't get phelgm out  Coughing spells  No SOB unless having a coughing fit  Sometimes wheezing  Sweating at night in bed  Last time was a few nights ago  Did not take temp  No body aches  No CP or chest tightness  No tachycardia    Started insulin Friday  Taking vitamin d        Review of Systems  ROS completely negative except what was mentioned in the HPI.  Problem List, surgical, social, and family histories which were reviewed and updated as necessary within the EMR. I also personally reviewed the notes, labs, and imaging that pertained to what was documented or discussed in the HPI.    Objective   Physical Exam  Vitals and nursing note reviewed.   Constitutional:       General: He is not in acute distress.     Appearance: Normal appearance. He is not ill-appearing.   HENT:      Head: Normocephalic and atraumatic.      Right Ear: Tympanic membrane, ear canal and external ear normal.      Left Ear: Tympanic membrane, ear canal and external ear normal.      Nose: Nose normal. No congestion.      Mouth/Throat:      Mouth: Mucous membranes are moist.      Pharynx: Oropharynx is clear.   Eyes:      Extraocular Movements: Extraocular movements intact.      Conjunctiva/sclera: Conjunctivae normal.      Pupils: Pupils are equal, round, and reactive to light.   Cardiovascular:      Rate and Rhythm: Normal rate and regular rhythm.      Heart sounds: Normal heart sounds.   Pulmonary:      Effort: Pulmonary  "effort is normal.      Breath sounds: Rhonchi present.   Musculoskeletal:         General: Normal range of motion.      Cervical back: Normal range of motion and neck supple.   Skin:     General: Skin is warm and dry.   Neurological:      General: No focal deficit present.      Mental Status: He is alert and oriented to person, place, and time. Mental status is at baseline.   Psychiatric:         Mood and Affect: Mood normal.         Behavior: Behavior normal.         Thought Content: Thought content normal.         Judgment: Judgment normal.         /73   Pulse 73   Temp 36.2 °C (97.1 °F) (Temporal)   Ht 1.651 m (5' 5\")   Wt 84.8 kg (187 lb)   SpO2 94%   BMI 31.12 kg/m²     Assessment/Plan    Problem List Items Addressed This Visit       Cervical spondylosis with myelopathy    Overview     S/p laminectomy in 1990's.   Still had occ sxs of neuropathy.   Surgical repair in 2022, now has dysphagia.   Secondary to recent orthopedic operation.         Current Assessment & Plan     New right arm weakness x1 week  Will defer to ortho surg who he will see tomorrow  Is supposed to have surgery 1/15/25 but feels he may cancel due to health         CHF NYHA class II (symptoms with moderately strenuous activities) (Multi)    Overview     On toprol xl and lisinopril and possible study medication   2010 EF 50%, followed by Dr. Turpin   2018 EF 40%   2020 EF 35%, bmp ok  2023 EF 35%  BP goal <130/80  Managed by [facility]  On Toprol, Lisinopril, Spironolactone, and Lasix.          Current Assessment & Plan     Has appt with cardiology 2/20/25         Paroxysmal ventricular tachycardia (Multi)    Overview     Followed by Cardiology. Had defibrillator 7/2018 defibrillator replaced, on ranolazine         Respiratory infection - Primary    Current Assessment & Plan     Ongoing 1.5 weeks  Start Azithromycin  CXR now  Discussed risks to steroids due to CHF and DM, will give steroid inhaler and cough suppressant         " Relevant Medications    azithromycin (Zithromax) 250 mg tablet    benzonatate (Tessalon) 200 mg capsule    Other Relevant Orders    XR chest 2 views (Completed)    Type 2 diabetes mellitus with diabetic polyneuropathy, with long-term current use of insulin    Overview     Lantus, Metformin, Jardiance, Victoza  A1C:    09/2021 = 12.8  10/2022 = 6.0  08/2023 = 6.2  Optometry exam: 4/2023.   Podiatry exam: 10/16/2023         Current Assessment & Plan     Reviewed [facility] Formulary 2025   Covered  - Brand name Lantus  - Generic Metformin with quantity limit  - Brand name Jardiance oral tabs with quantity limit    Not covered  - Victoza;  however, Mounjaro brand (with PA and QL) or Ozempic brand (with PA and QL) is covered    He will let me know when nearing end of victoza and will switch to mounjaro         Vitamin D deficiency    Overview     1/2/25 = 20  On supplement         Current Assessment & Plan     Reminded him to take 8 weeks of high dose Vit D   Repeat lab  Then start 2000IU daily thereafter          Other Visit Diagnoses       Right arm weakness        Bronchitis        Relevant Medications    benzonatate (Tessalon) 200 mg capsule    Asthma, unspecified asthma severity, unspecified whether complicated, unspecified whether persistent ([facility] [place] [facility])        Relevant Medications    albuterol-budesonide (Airsupra) 90-80 mcg/actuation inhaler

## 2025-01-16 ENCOUNTER — ANESTHESIA EVENT (OUTPATIENT)
Dept: SURGERY | Facility: HOSPITAL | Age: 60
End: 2025-01-16
Payer: MEDICAID

## 2025-01-17 ENCOUNTER — HOSPITAL ENCOUNTER (OUTPATIENT)
Facility: HOSPITAL | Age: 60
Setting detail: HOSPITAL OUTPATIENT SURGERY
Discharge: HOME | End: 2025-01-17
Attending: STUDENT IN AN ORGANIZED HEALTH CARE EDUCATION/TRAINING PROGRAM | Admitting: STUDENT IN AN ORGANIZED HEALTH CARE EDUCATION/TRAINING PROGRAM
Payer: MEDICAID

## 2025-01-17 ENCOUNTER — ANESTHESIA (OUTPATIENT)
Dept: SURGERY | Facility: HOSPITAL | Age: 60
End: 2025-01-17
Payer: MEDICAID

## 2025-01-17 VITALS
TEMPERATURE: 98 F | OXYGEN SATURATION: 98 % | SYSTOLIC BLOOD PRESSURE: 150 MMHG | HEIGHT: 70 IN | WEIGHT: 204.38 LBS | HEART RATE: 85 BPM | BODY MASS INDEX: 29.26 KG/M2 | DIASTOLIC BLOOD PRESSURE: 101 MMHG | RESPIRATION RATE: 20 BRPM

## 2025-01-17 DIAGNOSIS — K60.30 ANAL FISTULA: Primary | ICD-10-CM

## 2025-01-17 PROCEDURE — 46275 REMOVE ANAL FIST INTER: CPT | Performed by: PHYSICIAN ASSISTANT

## 2025-01-17 PROCEDURE — 46275 REMOVE ANAL FIST INTER: CPT | Performed by: STUDENT IN AN ORGANIZED HEALTH CARE EDUCATION/TRAINING PROGRAM

## 2025-01-17 PROCEDURE — 0DBQ8ZZ EXCISION OF ANUS, VIA NATURAL OR ARTIFICIAL OPENING ENDOSCOPIC: ICD-10-PCS | Performed by: STUDENT IN AN ORGANIZED HEALTH CARE EDUCATION/TRAINING PROGRAM

## 2025-01-17 PROCEDURE — S0028 INJECTION, FAMOTIDINE, 20 MG: HCPCS | Performed by: NURSE ANESTHETIST, CERTIFIED REGISTERED

## 2025-01-17 RX ORDER — NEOSTIGMINE METHYLSULFATE 1 MG/ML
INJECTION INTRAVENOUS AS NEEDED
Status: DISCONTINUED | OUTPATIENT
Start: 2025-01-17 | End: 2025-01-17 | Stop reason: SURG

## 2025-01-17 RX ORDER — PROCHLORPERAZINE EDISYLATE 5 MG/ML
5 INJECTION INTRAMUSCULAR; INTRAVENOUS EVERY 8 HOURS PRN
Status: DISCONTINUED | OUTPATIENT
Start: 2025-01-17 | End: 2025-01-17

## 2025-01-17 RX ORDER — HYDROMORPHONE HYDROCHLORIDE 1 MG/ML
0.4 INJECTION, SOLUTION INTRAMUSCULAR; INTRAVENOUS; SUBCUTANEOUS EVERY 5 MIN PRN
Status: DISCONTINUED | OUTPATIENT
Start: 2025-01-17 | End: 2025-01-17

## 2025-01-17 RX ORDER — LIDOCAINE HYDROCHLORIDE 10 MG/ML
INJECTION, SOLUTION EPIDURAL; INFILTRATION; INTRACAUDAL; PERINEURAL AS NEEDED
Status: DISCONTINUED | OUTPATIENT
Start: 2025-01-17 | End: 2025-01-17 | Stop reason: SURG

## 2025-01-17 RX ORDER — MIDAZOLAM HYDROCHLORIDE 1 MG/ML
1 INJECTION INTRAMUSCULAR; INTRAVENOUS EVERY 5 MIN PRN
Status: DISCONTINUED | OUTPATIENT
Start: 2025-01-17 | End: 2025-01-17

## 2025-01-17 RX ORDER — ONDANSETRON 2 MG/ML
4 INJECTION INTRAMUSCULAR; INTRAVENOUS EVERY 6 HOURS PRN
Status: DISCONTINUED | OUTPATIENT
Start: 2025-01-17 | End: 2025-01-17

## 2025-01-17 RX ORDER — NICOTINE POLACRILEX 4 MG
30 LOZENGE BUCCAL
Status: DISCONTINUED | OUTPATIENT
Start: 2025-01-17 | End: 2025-01-17

## 2025-01-17 RX ORDER — HYDROCODONE BITARTRATE AND ACETAMINOPHEN 5; 325 MG/1; MG/1
2 TABLET ORAL ONCE AS NEEDED
Status: DISCONTINUED | OUTPATIENT
Start: 2025-01-17 | End: 2025-01-17

## 2025-01-17 RX ORDER — KETOROLAC TROMETHAMINE 30 MG/ML
INJECTION, SOLUTION INTRAMUSCULAR; INTRAVENOUS AS NEEDED
Status: DISCONTINUED | OUTPATIENT
Start: 2025-01-17 | End: 2025-01-17 | Stop reason: SURG

## 2025-01-17 RX ORDER — METRONIDAZOLE 500 MG/100ML
500 INJECTION, SOLUTION INTRAVENOUS ONCE
Status: COMPLETED | OUTPATIENT
Start: 2025-01-17 | End: 2025-01-17

## 2025-01-17 RX ORDER — SODIUM CHLORIDE, SODIUM LACTATE, POTASSIUM CHLORIDE, CALCIUM CHLORIDE 600; 310; 30; 20 MG/100ML; MG/100ML; MG/100ML; MG/100ML
INJECTION, SOLUTION INTRAVENOUS CONTINUOUS
Status: DISCONTINUED | OUTPATIENT
Start: 2025-01-17 | End: 2025-01-17

## 2025-01-17 RX ORDER — HYDROCODONE BITARTRATE AND ACETAMINOPHEN 5; 325 MG/1; MG/1
1 TABLET ORAL ONCE AS NEEDED
Status: DISCONTINUED | OUTPATIENT
Start: 2025-01-17 | End: 2025-01-17

## 2025-01-17 RX ORDER — ACETAMINOPHEN 500 MG
1000 TABLET ORAL ONCE AS NEEDED
Status: DISCONTINUED | OUTPATIENT
Start: 2025-01-17 | End: 2025-01-17

## 2025-01-17 RX ORDER — ACETAMINOPHEN 500 MG
1000 TABLET ORAL ONCE
Status: DISCONTINUED | OUTPATIENT
Start: 2025-01-17 | End: 2025-01-17 | Stop reason: HOSPADM

## 2025-01-17 RX ORDER — SCOPOLAMINE 1 MG/3D
1 PATCH, EXTENDED RELEASE TRANSDERMAL ONCE
Status: DISCONTINUED | OUTPATIENT
Start: 2025-01-17 | End: 2025-01-17 | Stop reason: HOSPADM

## 2025-01-17 RX ORDER — MEPERIDINE HYDROCHLORIDE 25 MG/ML
12.5 INJECTION INTRAMUSCULAR; INTRAVENOUS; SUBCUTANEOUS AS NEEDED
Status: DISCONTINUED | OUTPATIENT
Start: 2025-01-17 | End: 2025-01-17

## 2025-01-17 RX ORDER — FAMOTIDINE 10 MG/ML
INJECTION, SOLUTION INTRAVENOUS AS NEEDED
Status: DISCONTINUED | OUTPATIENT
Start: 2025-01-17 | End: 2025-01-17 | Stop reason: SURG

## 2025-01-17 RX ORDER — DEXAMETHASONE SODIUM PHOSPHATE 4 MG/ML
VIAL (ML) INJECTION AS NEEDED
Status: DISCONTINUED | OUTPATIENT
Start: 2025-01-17 | End: 2025-01-17 | Stop reason: SURG

## 2025-01-17 RX ORDER — BUPIVACAINE HYDROCHLORIDE AND EPINEPHRINE 5; 5 MG/ML; UG/ML
INJECTION, SOLUTION EPIDURAL; INTRACAUDAL; PERINEURAL AS NEEDED
Status: DISCONTINUED | OUTPATIENT
Start: 2025-01-17 | End: 2025-01-17 | Stop reason: HOSPADM

## 2025-01-17 RX ORDER — HYDROCODONE BITARTRATE AND ACETAMINOPHEN 5; 325 MG/1; MG/1
1 TABLET ORAL EVERY 6 HOURS PRN
Qty: 20 TABLET | Refills: 0 | Status: SHIPPED | OUTPATIENT
Start: 2025-01-17

## 2025-01-17 RX ORDER — LABETALOL HYDROCHLORIDE 5 MG/ML
5 INJECTION, SOLUTION INTRAVENOUS EVERY 5 MIN PRN
Status: DISCONTINUED | OUTPATIENT
Start: 2025-01-17 | End: 2025-01-17

## 2025-01-17 RX ORDER — NICOTINE POLACRILEX 4 MG
15 LOZENGE BUCCAL
Status: DISCONTINUED | OUTPATIENT
Start: 2025-01-17 | End: 2025-01-17

## 2025-01-17 RX ORDER — ROCURONIUM BROMIDE 10 MG/ML
INJECTION, SOLUTION INTRAVENOUS AS NEEDED
Status: DISCONTINUED | OUTPATIENT
Start: 2025-01-17 | End: 2025-01-17 | Stop reason: SURG

## 2025-01-17 RX ORDER — PHENYLEPHRINE HCL 10 MG/ML
VIAL (ML) INJECTION AS NEEDED
Status: DISCONTINUED | OUTPATIENT
Start: 2025-01-17 | End: 2025-01-17 | Stop reason: SURG

## 2025-01-17 RX ORDER — NALOXONE HYDROCHLORIDE 0.4 MG/ML
0.08 INJECTION, SOLUTION INTRAMUSCULAR; INTRAVENOUS; SUBCUTANEOUS AS NEEDED
Status: DISCONTINUED | OUTPATIENT
Start: 2025-01-17 | End: 2025-01-17

## 2025-01-17 RX ORDER — GLYCOPYRROLATE 0.2 MG/ML
INJECTION, SOLUTION INTRAMUSCULAR; INTRAVENOUS AS NEEDED
Status: DISCONTINUED | OUTPATIENT
Start: 2025-01-17 | End: 2025-01-17 | Stop reason: SURG

## 2025-01-17 RX ORDER — DEXTROSE MONOHYDRATE 25 G/50ML
50 INJECTION, SOLUTION INTRAVENOUS
Status: DISCONTINUED | OUTPATIENT
Start: 2025-01-17 | End: 2025-01-17

## 2025-01-17 RX ORDER — METOCLOPRAMIDE HYDROCHLORIDE 5 MG/ML
INJECTION INTRAMUSCULAR; INTRAVENOUS AS NEEDED
Status: DISCONTINUED | OUTPATIENT
Start: 2025-01-17 | End: 2025-01-17 | Stop reason: SURG

## 2025-01-17 RX ORDER — HYDROMORPHONE HYDROCHLORIDE 1 MG/ML
0.2 INJECTION, SOLUTION INTRAMUSCULAR; INTRAVENOUS; SUBCUTANEOUS EVERY 5 MIN PRN
Status: DISCONTINUED | OUTPATIENT
Start: 2025-01-17 | End: 2025-01-17

## 2025-01-17 RX ORDER — ONDANSETRON 2 MG/ML
INJECTION INTRAMUSCULAR; INTRAVENOUS AS NEEDED
Status: DISCONTINUED | OUTPATIENT
Start: 2025-01-17 | End: 2025-01-17 | Stop reason: SURG

## 2025-01-17 RX ORDER — HYDROMORPHONE HYDROCHLORIDE 1 MG/ML
0.6 INJECTION, SOLUTION INTRAMUSCULAR; INTRAVENOUS; SUBCUTANEOUS EVERY 5 MIN PRN
Status: DISCONTINUED | OUTPATIENT
Start: 2025-01-17 | End: 2025-01-17

## 2025-01-17 RX ADMIN — KETOROLAC TROMETHAMINE 30 MG: 30 INJECTION, SOLUTION INTRAMUSCULAR; INTRAVENOUS at 08:52:00

## 2025-01-17 RX ADMIN — PHENYLEPHRINE HCL 100 MCG: 10 MG/ML VIAL (ML) INJECTION at 08:55:00

## 2025-01-17 RX ADMIN — LIDOCAINE HYDROCHLORIDE 50 MG: 10 INJECTION, SOLUTION EPIDURAL; INFILTRATION; INTRACAUDAL; PERINEURAL at 07:42:00

## 2025-01-17 RX ADMIN — SODIUM CHLORIDE, SODIUM LACTATE, POTASSIUM CHLORIDE, CALCIUM CHLORIDE: 600; 310; 30; 20 INJECTION, SOLUTION INTRAVENOUS at 07:37:00

## 2025-01-17 RX ADMIN — DEXAMETHASONE SODIUM PHOSPHATE 8 MG: 4 MG/ML VIAL (ML) INJECTION at 07:49:00

## 2025-01-17 RX ADMIN — METOCLOPRAMIDE HYDROCHLORIDE 10 MG: 5 INJECTION INTRAMUSCULAR; INTRAVENOUS at 07:49:00

## 2025-01-17 RX ADMIN — NEOSTIGMINE METHYLSULFATE 5 MG: 1 INJECTION INTRAVENOUS at 09:06:00

## 2025-01-17 RX ADMIN — METRONIDAZOLE 500 MG: 500 INJECTION, SOLUTION INTRAVENOUS at 07:47:00

## 2025-01-17 RX ADMIN — ROCURONIUM BROMIDE 40 MG: 10 INJECTION, SOLUTION INTRAVENOUS at 07:42:00

## 2025-01-17 RX ADMIN — FAMOTIDINE 20 MG: 10 INJECTION, SOLUTION INTRAVENOUS at 07:49:00

## 2025-01-17 RX ADMIN — GLYCOPYRROLATE 0.8 MG: 0.2 INJECTION, SOLUTION INTRAMUSCULAR; INTRAVENOUS at 09:06:00

## 2025-01-17 RX ADMIN — ONDANSETRON 4 MG: 2 INJECTION INTRAMUSCULAR; INTRAVENOUS at 08:52:00

## 2025-01-17 RX ADMIN — SODIUM CHLORIDE, SODIUM LACTATE, POTASSIUM CHLORIDE, CALCIUM CHLORIDE: 600; 310; 30; 20 INJECTION, SOLUTION INTRAVENOUS at 09:08:00

## 2025-01-17 RX ADMIN — PHENYLEPHRINE HCL 100 MCG: 10 MG/ML VIAL (ML) INJECTION at 07:57:00

## 2025-01-17 NOTE — ANESTHESIA PREPROCEDURE EVALUATION
PRE-OP EVALUATION    Patient Name: Arnulfo Clark    Admit Diagnosis: Anal fistula [K60.3]    Pre-op Diagnosis: Anal fistula [K60.3]    ANAL EXAMINATION  UNDER ANESTHESIA, POSSIBLE FISTULOTOMY, LIGATION OF INTERSPHINCTERIC FISTULA TRACT    Anesthesia Procedure: ANAL EXAMINATION  UNDER ANESTHESIA, POSSIBLE FISTULOTOMY, LIGATION OF INTERSPHINCTERIC FISTULA TRACT    Surgeons and Role:     * Herman Jiménez MD - Primary    Pre-op vitals reviewed.        Body mass index is 29.84 kg/m².    Current medications reviewed.  Hospital Medications:  No current facility-administered medications on file as of .       Outpatient Medications:     No medications prior to admission.       Allergies: Calamari oil [squid oil], Grass, and Ragweed      Anesthesia Evaluation    Patient summary reviewed.    Anesthetic Complications  (+) history of anesthetic complications  History of: PONV       GI/Hepatic/Renal      (+) GERD                           Cardiovascular  Comment: Hx of CABG-stable since     EKG  Normal sinus rhythm   Prolonged QT interval or tu fusion, consider myocardial disease, electrolyte imbalance, or drug effects   Abnormal ECG   When compared with ECG of 13-FEB-2024 12:48,   No significant change was found   Confirmed by Tod Ponce ([id number]) on 7/24/2024 10:58:54 AM         ECG reviewed.  Exercise tolerance: good     MET: >4      (+) hypertension     (+) CAD                  (-) angina     (-) VEE  (-) orthopnea       Endo/Other                                  Pulmonary      (-) asthma         (-) shortness of breath     (+) sleep apnea       Neuro/Psych                              Patient had nausea with last anesthetic and bridges on lower rear teeth were dislodged.    No longer takes metoprolol        Past Surgical History:   Procedure Laterality Date    Cabg  09/2023    Colonoscopy N/A 09/15/2020    Procedure: COLONOSCOPY;  Surgeon: Bhavesh Beltran MD;  Location: [facility] ENDOSCOPY    Colonoscopy       Colonoscopy N/A 01/16/2024    Dr. Beltran; Colon polyps    Colonoscopy N/A 01/16/2024    Procedure: COLONOSCOPY;  Surgeon: Bhavesh Beltran MD;  Location: [facility] ENDOSCOPY    Electrocardiogram, complete  02/25/2014    Scanned to media tab     Other surgical history  08/02/2024    ANAL EXAMINATION UNDER ANESTHESIA, PARTIAL FISTULOTOMY, SETON PLACEMENT    Removal anal fistula,subcutaneous      Upper gi endoscopy,exam       Social History     Socioeconomic History    Marital status:    Tobacco Use    Smoking status: Never    Smokeless tobacco: Never   Vaping Use    Vaping status: Never Used   Substance and Sexual Activity    Alcohol use: Yes     Alcohol/week: 1.0 standard drink of alcohol     Types: 1 Standard drinks or equivalent per week     Comment: OCASSIONAL    Drug use: No    Sexual activity: Not Currently   Other Topics Concern    Caffeine Concern Yes     Comment: Daily; coffee, 1 cup     Reaction to local anesthetic No     History   Drug Use No     Available pre-op labs reviewed.  Lab Results   Component Value Date    WBC 8.8 11/15/2024    RBC 5.49 11/15/2024    HGB 16.4 11/15/2024    HCT 47.8 11/15/2024    MCV 87.1 11/15/2024    MCH 29.9 11/15/2024    MCHC 34.3 11/15/2024    RDW 11.7 11/15/2024    .0 11/15/2024                 Airway      Mallampati: II  Mouth opening: >3 FB  TM distance: 4 - 6 cm  Neck ROM: full Cardiovascular    Cardiovascular exam normal.  Rhythm: regular  Rate: normal     Dental    Dentition appears grossly intact         Pulmonary    Pulmonary exam normal.  Breath sounds clear to auscultation bilaterally.               Other findings              ASA: 3   Plan: general  NPO status verified and patient meets guidelines.        Comment: Discussed cardiac risk.  Plan/risks discussed with: patient                Present on Admission:  **None**

## 2025-01-17 NOTE — DISCHARGE INSTRUCTIONS
Anal Surgery  Post-Surgical Instructions     Herman Jiménez MD         MEDICATIONS  You will be given a prescription for pain.  Take 1 tablet every 6 hours as needed.  Pain medications will ease your pain, but you should expect some incisional pain for about 7-10 days.  You may take acetaminophen (Tylenol) up to 650 mg every 6 hours as needed for mild-moderate pain. You may take ibuprofen (Motrin) up to 600 mg every 6 hours as needed for mild-moderate pain. Take narcotic pain medication as needed for severe pain per your prescription. Do not drive while taking narcotic pain medication. Many patients take a stool softener as narcotics are known to cause constipation. You should walk often, cough and take deep breaths.       DIET  You will begin a high fiber diet.  The easiest way to a high fiber diet is to take a fiber supplement.  An excellent supplement is plain, unflavored Metamucil.  You should take one tablespoon in 8 oz of water twice a day.  Ideally, you should take the supplement before breakfast and dinner.  You may experience some gas bloating for the first 2 weeks. This is normal and will go away as long as you keep taking the supplement.  Other supplements that can be taken include Per Courtney, Benefiber, Konsyl, or Citrucel. Continue to take the fiber supplement for 1 month.     In addition, it is a good idea go to the drugstore and a stool softener, such as docusate, and a gentle laxative such as MiraLAX.  Taking MiraLAX daily and stool softener 1-2 times a day may prevent you from getting overly constipated while on the narcotic drug.  Once you stop taking the prescription pain medication, you may stop taking the stool softener and laxative.        WOUND CARE   You will perform sitz baths two-three times a day for 3-4 weeks.  Sitz baths simply mean soaking your anus in a tub of warm-hot water for about 15 minutes.  Sitz baths will clean the anal wound as well as relax the anal sphincter muscles, which  will help minimize your pain.  Be careful around the anal wound, especially if you have stitches on the outside.  Gently pat your anus dry (never rub) or simply dry your anus with a blow-dryer set to cool/warm.   Do not soak your anus for more than 15 minutes.        ACTIVITY  After surgery, your driving reflexes will be slower, especially if you are taking pain medications.  Therefore, you are restricted from driving until after your follow-up visit and after you have stopped taking your prescription pain meds.  You may walk about the house, go shopping, or eat at a restaurant.  You may also climb stairs and exercise. You can sit on your wound, but keep in mind that the less you sit, the less pain you will have.     APPOINTMENT  Please call our office for an appointment in 2-4 weeks after surgery, unless otherwise instructed.  This will allow ample time for the swelling and soreness to resolve before your wound is examined.  There may be some bleeding from your wound.  This is normal.  If you begin bleeding heavily, have fevers, chills, or if you are concerned about your wound, please call us immediately at [phone].     Thank you for entrusting us with your care.

## 2025-01-17 NOTE — OPERATIVE REPORT
[facility]  Operative Note    Arnulfo Clark Location: OR   [facility] [id number] MRN [id number]    5/10/1965 Age 59 year old   Admission Date 2025 Operation Date 2025   Attending Physician Herman Jiménez MD Operating Physician Herman Jiménez MD   PCP Cole Cary MD          Patient Name: Arnulfo Clark    Preoperative Diagnosis: Anal fistula [K60.30]    Postoperative Diagnosis: Same as preoperative diagnosis    Primary Surgeon: Herman Jiménez MD    Assistant: Telma Lagos    Anesthesia: General    Procedures: Anal exam under anesthesia with anoscopy, ligation of intersphincteric fistula tract (LIFT) procedure    Implants: None    Specimen: External fistula opening    Drains: None    Estimated Blood Loss: 5 cc    Complications: None immediate    Condition: Stable    Indications for Surgery:   This is a very nice 59-year-old gentleman with history of a high transsphincteric right anterior anal fistula who underwent creation of a rectal advancement flap in attempt to repair the anal fistula on 2024.  Unfortunately, the flap failed and the fistula recurred.  Most recently, I took the patient to the operating room on 2024 and discovered a recurrent/persistent high transsphincteric anterior anal fistula.  External opening in the perineum just to the right of midline 5 cm from the anal verge.  Internal opening at the level of the dentate line just to the right of midline.  The fistula was rather deep and encompassed the majority of the sphincter complex.  I performed a partial fistulotomy and seton placement.  Patient returns for follow-up today.     Patient continues to have perianal drainage and discomfort.  No fevers.  No incontinence.     There is an external opening in the perineum just to the right of midline with vessel loop seton in place.  This opening is around 4 cm from the anal verge.  There is a second opening in the perineum distal to this where I was able to  express a small amount of purulent fluid.  There is a skin bridge between this more distal external opening and the external opening where the vessel loop seton exits.     Patient was interested in hearing his options for definitive fistula repair.  I do not think the patient would be a good candidate for fistulotomy as the fistula tract is quite deep and there is a large amount of overlying sphincter muscle.  I also do not think the patient is a good candidate for redo rectal advancement flap.     Therefore, I think the next best reasonable treatment option would be to attempt ligation of the intersphincteric fistula tract or LIFT procedure.  The details of the surgery were discussed including the expected recovery time, risks, benefits and alternatives.  Patient expressed understanding and was agreeable to schedule surgery with me.  This has been scheduled for 1/17/2025 at [facility].    Patient presents for surgery today.  Consent was signed.  All questions answered.    Surgical Findings:   High, transsphincteric, anterior anal fistula.  External opening in the perineum approximately 3-4 cm from the anal verge just to the right of midline.  Internal opening at the level of the dentate line in the right anterior anal canal.  The fistula tract remain fairly deep encompassing at least 30-40% of the patient's total sphincter bulk.    Description of Procedure:   Patient was brought to the operating room on the transport cart. Bilateral sequential compression devices were placed. Preoperative antibiotics were given.  Patient was induced under general endotracheal anesthesia.  Patient was then carefully flipped prone onto the OR table with all pressure points well-padded.  Patient was positioned in prone jackknife with the buttocks retracted apart with silk tape.  The anus and perianal skin were prepped and draped in the usual sterile fashion.  A timeout was performed.     I began with external exam of the anus,  digital rectal exam and anoscopy using a Rousseau bivalve anoscope.  I used a fistula probe to cannulate the fistula tract with ease.  I assessed the overlying muscle involvement.  There was evidence of a high, transsphincteric anal fistula which encompassed a portion of the internal and external sphincter.  I estimated that there was at least 30-40%+ of his total sphincter bulk overlying the fistula tract.  I was reluctant to perform a fistulotomy with this much muscle involvement in an anterior position due to risk of postoperative fecal incontinence.  The rectal mucosa appeared a bit scarred around the internal opening due to previous failed rectal advancement flap. I decided to proceed with a LIFT procedure.     The anal canal was placed under stretch using a Rousseau bivalve anoscope.  I identified the intersphincteric groove in the right anterior position.  The anoderm was incised and the intersphincteric groove using electrocautery.  This incision was deepened through the intersphincteric space on either side of the fistula tract using electrocautery and a right angle clamp.  I was able to encircle the fistula tract using the right angle clamp.       2-0 PDS sutures were passed around the tract x 2.  One suture was tied proximally and the other was tied distally.  The tract was divided between the sutures. The proximal and distal ends of the fistula tract were ligated using several interrupted 3-0 Vicryl figure-of-eight sutures.  The wound bed was irrigated with saline solution and appeared hemostatic.  The retractors were removed.  The external opening was widened using electrocautery.  The fistula tract was curetted.  The skin incision was reapproximated using interrupted 3-0 Vicryl sutures.  30 cc of 0.5% Marcaine with epinephrine was injected around the incision, anus and external opening for local anesthesia.  The skin was cleaned and dried and a dressing of 4 x 4 gauze, ABD pad, tape and underwear was  applied.     Patient was carefully flipped back supine onto the transport cart, awakened from anesthesia, extubated and transferred the postanesthesia care unit in stable condition.  All sponge, needle and instrument counts are correct at the end of the case.  I was present for the entire case.      Herman Jiménez MD  1/17/2025  9:31 AM

## 2025-01-17 NOTE — ANESTHESIA PROCEDURE NOTES
Airway  Date/Time: 1/17/2025 7:44 AM  Urgency: elective    Airway not difficult    General Information and Staff    Patient location during procedure: OR  Anesthesiologist: Pinky Austin MD  Resident/CRNA: Dre Henry CRNA  Performed: CRNA   Performed by: Dre Henry CRNA  Authorized by: Dre Henry CRNA      Indications and Patient Condition  Indications for airway management: anesthesia  Spontaneous Ventilation: absent  Sedation level: deep  Preoxygenated: yes  Patient position: sniffing  Mask difficulty assessment: 2 - vent by mask + OA or adjuvant +/- NMBA  Planned trial extubation    Final Airway Details  Final airway type: endotracheal airway      Successful airway: ETT  Cuffed: yes   Successful intubation technique: direct laryngoscopy  Facilitating devices/methods: intubating stylet  Endotracheal tube insertion site: oral  Blade: Dorita  Blade size: #3  ETT size (mm): 7.0    Cormack-Lehane Classification: grade I - full view of glottis  Placement verified by: capnometry   Cuff volume (mL): 6  Measured from: lips  ETT to lips (cm): 22  Number of attempts at approach: 1  Number of other approaches attempted: 0    Additional Comments  Dentition per pre op

## 2025-01-17 NOTE — ANESTHESIA POSTPROCEDURE EVALUATION
[facility]    Arnulfo Clark Patient Status:  Hospital Outpatient Surgery   Age/Gender 59 year old male MRN [id number]   Location [facility] SURGERY Attending Herman Jiménez MD   Hosp Day # 0 PCP Cole Cary MD       Anesthesia Post-op Note    ANAL EXAMINATION  UNDER ANESTHESIA, LIGATION OF INTERSPHINCTERIC FISTULA TRACT    Procedure Summary       Date: 01/17/25 Room / Location:  MAIN OR [address] [place] [address] MAIN OR    Anesthesia Start: 0737 Anesthesia Stop: 0924    Procedure: ANAL EXAMINATION  UNDER ANESTHESIA, LIGATION OF INTERSPHINCTERIC FISTULA TRACT Diagnosis:       Anal fistula      (Anal fistula [K60.30])    Surgeons: Herman Jiménez MD Anesthesiologist: Pinky Austin MD    Anesthesia Type: general ASA Status: 3            Anesthesia Type: general    Vitals Value Taken Time   /90 01/17/25 0925   Temp 97 01/17/25 0925   Pulse 80 01/17/25 0925   Resp 17 01/17/25 0925   SpO2 97 01/17/25 0925           Patient Location: PACU    Anesthesia Type: general    Airway Patency: patent and extubated    Postop Pain Control: adequate    Mental Status: preanesthetic baseline    Nausea/Vomiting: none    Cardiopulmonary/Hydration status: stable euvolemic    Complications: no apparent anesthesia related complications    Postop vital signs: stable    Dental Exam: Unchanged from Preop    Patient to be discharged from PACU when criteria met.

## 2025-01-17 NOTE — H&P
Follow Up Visit Note       Active Problems      1. Anal fistula          Chief Complaint   No chief complaint on file.      History of Present Illness  This is a very nice 59-year-old gentleman with history of a high transsphincteric right anterior anal fistula who underwent creation of a rectal advancement flap in attempt to repair the anal fistula on 4/19/2024.  Unfortunately, the flap failed and the fistula recurred.  Most recently, I took the patient to the operating room on 8/2/2024 and discovered a recurrent/persistent high transsphincteric anterior anal fistula.  External opening in the perineum just to the right of midline 5 cm from the anal verge.  Internal opening at the level of the dentate line just to the right of midline.  The fistula was rather deep and encompassed the majority of the sphincter complex.  I performed a partial fistulotomy and seton placement.  Patient returns for follow-up today.    Patient continues to have perianal drainage and discomfort.  No fevers.  No incontinence.      Allergies  Arnulfo is allergic to calamari oil [squid oil], grass, and ragweed.    Past Medical / Surgical / Social / Family History    The past medical and past surgical history have been reviewed by me today.    Past Medical History:    Age-related nuclear cataract of both eyes    Coronary atherosclerosis    Esophageal reflux    Glaucoma    started on Latanoprost qhs OS after abnormal OCT result 10/31/15    Heart attack (HCC)    High blood pressure    High cholesterol    Hx of motion sickness    Lipid screening    Lumbar herniated disc    Physical Therapy     MANAN on CPAP    Pigmentary dispersion syndrome    Pigmentary glaucoma of both eyes, moderate stage    1/30/2020- pt had been lost to follow up since 2016 and came in with IOP of 27 OU and abnormal VF and OCT OS- and was restarted on Latanoprost OS and started Latanoprost OD due to OHT    PONV (postoperative nausea and vomiting)    Sleep apnea    LAST CPAP USE 2  YRS AGO    Visual impairment    readers     Past Surgical History:   Procedure Laterality Date    Cabg  09/2023    Colonoscopy N/A 09/15/2020    Procedure: COLONOSCOPY;  Surgeon: Bhavesh Beltran MD;  Location: [facility] ENDOSCOPY    Colonoscopy      Colonoscopy N/A 01/16/2024    Dr. Beltran; Colon polyps    Colonoscopy N/A 01/16/2024    Procedure: COLONOSCOPY;  Surgeon: Bhavesh Beltran MD;  Location: [facility] ENDOSCOPY    Electrocardiogram, complete  02/25/2014    Scanned to media tab     Other surgical history  08/02/2024    ANAL EXAMINATION UNDER ANESTHESIA, PARTIAL FISTULOTOMY, SETON PLACEMENT    Removal anal fistula,subcutaneous      Upper gi endoscopy,exam         The family history and social history have been reviewed by me today.    Family History   Problem Relation Age of Onset    Heart Disease Mother     Heart Disease Maternal Grandmother     Diabetes Neg     Glaucoma Neg     Macular degeneration Neg      Social History     Socioeconomic History    Marital status:    Tobacco Use    Smoking status: Never    Smokeless tobacco: Never   Vaping Use    Vaping status: Never Used   Substance and Sexual Activity    Alcohol use: Yes     Alcohol/week: 1.0 standard drink of alcohol     Types: 1 Standard drinks or equivalent per week     Comment: OCASSIONAL    Drug use: No    Sexual activity: Not Currently   Other Topics Concern    Caffeine Concern Yes     Comment: Daily; coffee, 1 cup     Reaction to local anesthetic No      No current outpatient medications on file.     Review of Systems  A 10 point review of systems was performed and negative unless otherwise documented per HPI.     Physical Findings   /83   Pulse 76   Temp 98.3 °F (36.8 °C) (Oral)   Resp 18   Ht 70\"   Wt 204 lb 6.4 oz (92.7 kg)   SpO2 97%   BMI 29.33 kg/m²   Physical Exam  Vitals and nursing note reviewed. Exam conducted with a chaperone present.   Constitutional:       General: He is not in acute distress.  HENT:       Head: Normocephalic and atraumatic.      Mouth/Throat:      Mouth: Mucous membranes are moist.   Cardiovascular:      Rate and Rhythm: Normal rate and regular rhythm.   Pulmonary:      Effort: Pulmonary effort is normal.   Abdominal:      General: There is no distension.      Palpations: Abdomen is soft.      Tenderness: There is no abdominal tenderness.   Genitourinary:     Comments: Patient examined in the prone jackknife position.  There is an external opening in the perineum just to the right of midline with vessel loop seton in place.  This opening is around 4 cm from the anal verge.  There is a second opening in the perineum distal to this where I was able to express a small amount of purulent fluid.  There is a skin bridge between this more distal external opening and the external opening where the vessel loop seton exits.  Musculoskeletal:         General: No deformity.   Skin:     General: Skin is warm and dry.   Neurological:      General: No focal deficit present.      Mental Status: He is alert.   Psychiatric:         Mood and Affect: Mood normal.          Assessment   1. Anal fistula      This is a very nice 59-year-old gentleman with history of a high transsphincteric right anterior anal fistula who underwent creation of a rectal advancement flap in attempt to repair the anal fistula on 4/19/2024.  Unfortunately, the flap failed and the fistula recurred.  Most recently, I took the patient to the operating room on 8/2/2024 and discovered a recurrent/persistent high transsphincteric anterior anal fistula.  External opening in the perineum just to the right of midline 5 cm from the anal verge.  Internal opening at the level of the dentate line just to the right of midline.  The fistula was rather deep and encompassed the majority of the sphincter complex.  I performed a partial fistulotomy and seton placement.  Patient returns for follow-up today.    Patient continues to have perianal drainage and  discomfort.  No fevers.  No incontinence.    There is an external opening in the perineum just to the right of midline with vessel loop seton in place.  This opening is around 4 cm from the anal verge.  There is a second opening in the perineum distal to this where I was able to express a small amount of purulent fluid.  There is a skin bridge between this more distal external opening and the external opening where the vessel loop seton exits.    Plan   Patient was interested in hearing his options for definitive fistula repair.  I do not think the patient would be a good candidate for fistulotomy as the fistula tract is quite deep and there is a large amount of overlying sphincter muscle.  I also do not think the patient is a good candidate for redo rectal advancement flap.    Therefore, I think the next best reasonable treatment option would be to attempt ligation of the intersphincteric fistula tract or LIFT procedure.  The details of the surgery were discussed including the expected recovery time, risks, benefits and alternatives.  Patient expressed understanding and was agreeable to schedule surgery with me.  This has been scheduled for 1/17/2025 at [facility].    In the meantime, continue local wound care and sitz bath's as needed.     Orders Placed This Encounter   Procedures    Basic Metabolic Panel (8)    EKG 12 Lead       Imaging & Referrals   VITAL SIGNS  NURSING COMMUNICATION  PLACE PIV  ACTIVITY AS TOLERATED  HEIGHT AND WEIGHT  INITIATE ADULT PREOP PROPHYLACTIC ABX PROTOCOL  VERIFY INFORMED CONSENT  NPO  VITAL SIGNS - NOTIFY PHYSICIAN  EKG 12-LEAD    Follow Up  No follow-ups on file.    Herman Jiménez MD

## 2025-01-24 RX ORDER — PANTOPRAZOLE SODIUM 40 MG/1
40 TABLET, DELAYED RELEASE ORAL
Qty: 90 TABLET | Refills: 0 | Status: SHIPPED | OUTPATIENT
Start: 2025-01-24

## 2025-01-24 NOTE — TELEPHONE ENCOUNTER
Refill passed per [facility] protocol.     Requested Prescriptions   Pending Prescriptions Disp Refills    PANTOPRAZOLE 40 MG Oral Tab EC [Pharmacy Med Name: PANTOPRAZOLE 40MG TABLETS] 90 tablet 0     Sig: TAKE 1 TABLET(40 MG) BY MOUTH EVERY MORNING BEFORE BREAKFAST       Gastrointestional Medication Protocol Passed - 1/24/2025  8:17 AM        Passed - In person appointment or virtual visit in the past 12 mos or appointment in next 3 mos     Recent Outpatient Visits              2 months ago [facility]    [facility] Visit    2 months ago Thyroid nodule    [facility]    Office Visit    2 months ago Anal fistula    [facility], [facility]    Office Visit    4 months ago Anal fistula    [facility], [facility]    Office Visit    7 months ago Anal fistula    [facility], [facility]    Office Visit                      Passed - Medication is active on med list             [unfilled]      [unfilled]

## 2025-02-04 ENCOUNTER — TELEPHONE (OUTPATIENT)
Facility: LOCATION | Age: 60
End: 2025-02-04

## 2025-02-04 NOTE — TELEPHONE ENCOUNTER
The patient recently called stating at his surgery site the wound is infected and he might go to the ER to see if their is anything that needs to be done. The patient also need a earlier appointment with .    Call back # [phone]

## 2025-02-04 NOTE — TELEPHONE ENCOUNTER
Spoke to patient who had ANAL EXAMINATION  UNDER ANESTHESIA, LIGATION OF INTERSPHINCTERIC FISTULA TRACT  on 1/17/25 with Dr Jiménez.  Per patient he noticed an increase in drainage the last few days and the drainage and become thicker as well.  Patient also reports a \"small bump\" and the previous surgical site with maybe a slight increase in pain.  Denies fever, chills, nausea or vomiting.  Having regular bowel movements without blood in stool.    Future Appointments   Date Time Provider Department Center   2/5/2025 11:15 AM [facility] PA EM[facility]THONY [id number]   2/17/2025  1:30 PM Herman Jiménez MD [facility] [id number]

## 2025-02-04 NOTE — TELEPHONE ENCOUNTER
Spoke to patient to advise patient to keep his appointment tomorrow with the PA where his wound can be  assessed and any additional recommendations can be made.  Verbalized understanding.    Future Appointments   Date Time Provider Department Center   2/5/2025 11:15 AM [facility] PA NOEMÍ [id number]   2/17/2025  1:30 PM Herman Jiménez MD [facility] [id number]

## 2025-02-05 ENCOUNTER — OFFICE VISIT (OUTPATIENT)
Facility: LOCATION | Age: 60
End: 2025-02-05
Payer: MEDICAID

## 2025-02-05 VITALS
WEIGHT: 204 LBS | BODY MASS INDEX: 29.2 KG/M2 | HEART RATE: 76 BPM | DIASTOLIC BLOOD PRESSURE: 97 MMHG | TEMPERATURE: 98 F | SYSTOLIC BLOOD PRESSURE: 155 MMHG | OXYGEN SATURATION: 97 % | HEIGHT: 70 IN

## 2025-02-05 DIAGNOSIS — Z98.890 POSTOPERATIVE STATE: Primary | ICD-10-CM

## 2025-02-05 DIAGNOSIS — K60.30 ANAL FISTULA: ICD-10-CM

## 2025-02-05 PROCEDURE — 99024 POSTOP FOLLOW-UP VISIT: CPT

## 2025-02-05 RX ORDER — LIDOCAINE 50 MG/G
PATCH TOPICAL
COMMUNITY
Start: 2025-01-09

## 2025-02-05 NOTE — PROGRESS NOTES
Follow Up Visit Note       Active Problems      1. Postoperative state    2. Anal fistula          Chief Complaint   Chief Complaint   Patient presents with    Post-Op     PO - 1/17 W/JJS, ANAL EXAMINATION  UNDER ANESTHESIA, LIGATION OF INTERSPHINCTERIC FISTULA TRACT, no symptoms.         History of Present Illness    Arnulfo Clark is a 59 year old male who presents to clinic for continued care and evaluation following anal exam under anesthesia, ligation of intersphincteric fistula tract on 1/17/25 with Dr. Jiménez.     The patient reports doing well postoperatively. He contacted our office yesterday with concerns of a small bump near previous surgical site. He report mild discomfort and is supplementing with over the counter analgesics as needed. He also reports serous drainage from the area.    He reports regular bowel movements and is taking Colace daily. He tries to follow a high fiber diet.     He denies fevers or chills.    Arnulfo cleans the area with soap and water daily. He applies a sanitary pad to the area as needed.     Allergies  Arnulfo is allergic to calamari oil [squid oil], grass, and ragweed.    Past Medical / Surgical / Social / Family History    The past medical and past surgical history have been reviewed by me today.    Past Medical History:    Age-related nuclear cataract of both eyes    Allergic rhinitis    Coronary atherosclerosis    Esophageal reflux    Essential hypertension    Glaucoma    started on Latanoprost qhs OS after abnormal OCT result 10/31/15    Heart attack (HCC)    High blood pressure    High cholesterol    Hx of motion sickness    Hyperlipidemia    Lipid screening    Lumbar herniated disc    Physical Therapy     MANAN on CPAP    Pigmentary dispersion syndrome    Pigmentary glaucoma of both eyes, moderate stage    1/30/2020- pt had been lost to follow up since 2016 and came in with IOP of 27 OU and abnormal VF and OCT OS- and was restarted on Latanoprost OS and started Latanoprost OD  due to OHT    PONV (postoperative nausea and vomiting)    Sleep apnea    LAST CPAP USE 2 YRS AGO    Visual impairment    readers     Past Surgical History:   Procedure Laterality Date    Cabg  09/2023    Colonoscopy N/A 09/15/2020    Procedure: COLONOSCOPY;  Surgeon: Bhavesh Beltran MD;  Location: [facility] ENDOSCOPY    Colonoscopy      Colonoscopy N/A 01/16/2024    Dr. Beltran; Colon polyps    Colonoscopy N/A 01/16/2024    Procedure: COLONOSCOPY;  Surgeon: Bhavesh Beltran MD;  Location: [facility] ENDOSCOPY    Electrocardiogram, complete  02/25/2014    Scanned to media tab     Other surgical history  08/02/2024    ANAL EXAMINATION UNDER ANESTHESIA, PARTIAL FISTULOTOMY, SETON PLACEMENT    Removal anal fistula,subcutaneous      Upper gi endoscopy,exam         The family history and social history have been reviewed by me today.    Family History   Problem Relation Age of Onset    Heart Disease Mother     Heart Disease Maternal Grandmother     Diabetes Neg     Glaucoma Neg     Macular degeneration Neg      Social History     Socioeconomic History    Marital status:    Tobacco Use    Smoking status: Never    Smokeless tobacco: Never   Vaping Use    Vaping status: Never Used   Substance and Sexual Activity    Alcohol use: Yes     Alcohol/week: 1.0 standard drink of alcohol     Types: 1 Glasses of wine per week     Comment: OCASSIONAL    Drug use: Never    Sexual activity: Not Currently   Other Topics Concern    Caffeine Concern Yes    Stress Concern No    Weight Concern No    Special Diet No    Exercise No    Seat Belt No    Reaction to local anesthetic No        Current Outpatient Medications:     lidocaine 5 % External Patch, APPLY TO THE AFFECTED AREA(S) ONE PATCH EVERY DAY AS NEEDED FOR PAIN, Disp: , Rfl:     pantoprazole 40 MG Oral Tab EC, Take 1 tablet (40 mg total) by mouth before breakfast., Disp: 90 tablet, Rfl: 0    aspirin 81 MG Oral Tab EC, aspirin 81 mg tablet,delayed release, [RxNorm: 567476],  Disp: , Rfl:     latanoprost 0.005 % Ophthalmic Solution, Place 1 drop into both eyes nightly. Instill 1 drop by ophthalmic route every night into both eyes, Disp: 3 each, Rfl: 3    rosuvastatin 40 MG Oral Tab, Take 1 tablet (40 mg total) by mouth nightly., Disp: 30 tablet, Rfl: 3     Review of Systems  The Review of Systems has been reviewed by me during today.  Review of Systems   Constitutional:  Negative for chills, diaphoresis, fatigue, fever and unexpected weight change.   HENT:  Negative for hearing loss, nosebleeds, sore throat and trouble swallowing.    Respiratory:  Negative for apnea, cough, shortness of breath and wheezing.    Cardiovascular:  Negative for chest pain, palpitations and leg swelling.   Gastrointestinal:  Negative for abdominal distention, abdominal pain, anal bleeding, blood in stool, constipation, diarrhea, nausea and vomiting.   Genitourinary:  Negative for difficulty urinating, dysuria, frequency and urgency.   Musculoskeletal:  Negative for arthralgias and myalgias.   Skin:  Negative for color change and rash.   Neurological:  Negative for tremors, syncope and weakness.   Hematological:  Negative for adenopathy. Does not bruise/bleed easily.   Psychiatric/Behavioral:  Negative for behavioral problems and sleep disturbance.         Physical Findings   BP (!) 155/97 (BP Location: Right arm, Patient Position: Sitting, Cuff Size: adult)   Pulse 76   Temp 97.8 °F (36.6 °C) (Temporal)   Ht 70\"   Wt 204 lb (92.5 kg)   SpO2 97%   BMI 29.27 kg/m²   Physical Exam  Constitutional:       Appearance: Normal appearance.   HENT:      Head: Normocephalic and atraumatic.   Eyes:      Extraocular Movements: Extraocular movements intact.      Pupils: Pupils are equal, round, and reactive to light.   Pulmonary:      Effort: Pulmonary effort is normal. No respiratory distress.   Abdominal:      General: Abdomen is flat. Bowel sounds are normal. There is no distension.      Palpations: Abdomen is  soft. There is no mass.      Tenderness: There is no abdominal tenderness. There is no guarding or rebound.   Genitourinary:     Comments: The patient was examined in prone jackknife position with MANUELA Saldivar, present as a chaperone.     External exam of the anus reveals external opening 3 cm from anal verge. 3-0 Vicryl sutures in place. There is no active drainage noted. The area is non-tender to palpation.  Musculoskeletal:         General: Normal range of motion.      Cervical back: Normal range of motion.   Skin:     General: Skin is warm.      Coloration: Skin is not jaundiced or pale.      Findings: No erythema.   Neurological:      General: No focal deficit present.      Mental Status: He is alert and oriented to person, place, and time.          Assessment   1. Postoperative state    2. Anal fistula        Plan   Overall, the patient continues to do well postoperatively.   Continue p.o. Tylenol or Motrin as needed for pain control.  Continue local wound care. Continue to wash area with soap and water daily, applying a sanitary pad, and sitz baths.   Continue bowel regimen with Colace.  He is scheduled to follow up with Dr. Jiménez on 2/17/25.   All of the patient's questions and concerns were addressed. He expressed his understanding and is in agreement with this plan.     No orders of the defined types were placed in this encounter.      Imaging & Referrals   None    Follow Up  No follow-ups on file.    JALEEL Hdz

## 2025-02-17 ENCOUNTER — OFFICE VISIT (OUTPATIENT)
Facility: LOCATION | Age: 60
End: 2025-02-17
Payer: MEDICAID

## 2025-02-17 VITALS
SYSTOLIC BLOOD PRESSURE: 172 MMHG | DIASTOLIC BLOOD PRESSURE: 98 MMHG | HEIGHT: 70 IN | TEMPERATURE: 97 F | OXYGEN SATURATION: 95 % | BODY MASS INDEX: 29.2 KG/M2 | HEART RATE: 75 BPM | WEIGHT: 204 LBS

## 2025-02-17 DIAGNOSIS — K60.30 ANAL FISTULA: ICD-10-CM

## 2025-02-17 DIAGNOSIS — Z98.890 POSTOPERATIVE STATE: Primary | ICD-10-CM

## 2025-02-25 NOTE — PROGRESS NOTES
Follow Up Visit Note       Active Problems      1. Postoperative state    2. Anal fistula          Chief Complaint   Chief Complaint   Patient presents with    Post-Op     PO - ANAL EXAMINATION  UNDER ANESTHESIA, LIGATION OF INTERSPHINCTERIC FISTULA TRACT W/ ROMINA 1/17, no symptoms.       History of Present Illness  This is a very nice 59-year-old gentleman with history of a high transsphincteric right anterior anal fistula who underwent creation of a rectal advancement flap in attempt to repair the anal fistula on 4/19/2024. Unfortunately, the flap failed and the fistula recurred. I took the patient back to the operating room on 8/2/2024 and discovered a recurrent/persistent high transsphincteric anterior anal fistula.  I placed a vessel loop seton.  Most recently, I performed a LIFT procedure on 1/17/2025.  Patient returns for routine postoperative follow-up today.    Patient is continuing to have drainage with itching and burning in his perineum.  He denies any pain or bleeding.  He denies any incontinence.      Allergies  Arnulfo is allergic to calamari oil [squid oil], grass, and ragweed.    Past Medical / Surgical / Social / Family History    The past medical and past surgical history have been reviewed by me today.    Past Medical History:    Age-related nuclear cataract of both eyes    Allergic rhinitis    Coronary atherosclerosis    Esophageal reflux    Essential hypertension    Glaucoma    started on Latanoprost qhs OS after abnormal OCT result 10/31/15    Heart attack (HCC)    High blood pressure    High cholesterol    Hx of motion sickness    Hyperlipidemia    Lipid screening    Lumbar herniated disc    Physical Therapy     MANAN on CPAP    Pigmentary dispersion syndrome    Pigmentary glaucoma of both eyes, moderate stage    1/30/2020- pt had been lost to follow up since 2016 and came in with IOP of 27 OU and abnormal VF and OCT OS- and was restarted on Latanoprost OS and started Latanoprost OD due to OHT     PONV (postoperative nausea and vomiting)    Sleep apnea    LAST CPAP USE 2 YRS AGO    Visual impairment    readers     Past Surgical History:   Procedure Laterality Date    Cabg  09/2023    Colonoscopy N/A 09/15/2020    Procedure: COLONOSCOPY;  Surgeon: Bhavesh Beltran MD;  Location: [facility] ENDOSCOPY    Colonoscopy      Colonoscopy N/A 01/16/2024    Dr. Beltran; Colon polyps    Colonoscopy N/A 01/16/2024    Procedure: COLONOSCOPY;  Surgeon: Bhavesh Beltran MD;  Location: [facility] ENDOSCOPY    Electrocardiogram, complete  02/25/2014    Scanned to media tab     Other surgical history  08/02/2024    ANAL EXAMINATION UNDER ANESTHESIA, PARTIAL FISTULOTOMY, SETON PLACEMENT    Removal anal fistula,subcutaneous      Upper gi endoscopy,exam         The family history and social history have been reviewed by me today.    Family History   Problem Relation Age of Onset    Heart Disease Mother     Heart Disease Maternal Grandmother     Diabetes Neg     Glaucoma Neg     Macular degeneration Neg      Social History     Socioeconomic History    Marital status:    Tobacco Use    Smoking status: Never    Smokeless tobacco: Never   Vaping Use    Vaping status: Never Used   Substance and Sexual Activity    Alcohol use: Yes     Alcohol/week: 1.0 standard drink of alcohol     Types: 1 Glasses of wine per week     Comment: OCASSIONAL    Drug use: Never    Sexual activity: Not Currently   Other Topics Concern    Caffeine Concern Yes    Stress Concern No    Weight Concern No    Special Diet No    Exercise No    Seat Belt No    Reaction to local anesthetic No        Current Outpatient Medications:     amoxicillin clavulanate 875-125 MG Oral Tab, Take 1 tablet by mouth 2 (two) times daily., Disp: 14 tablet, Rfl: 0    lidocaine 5 % External Patch, APPLY TO THE AFFECTED AREA(S) ONE PATCH EVERY DAY AS NEEDED FOR PAIN, Disp: , Rfl:     pantoprazole 40 MG Oral Tab EC, Take 1 tablet (40 mg total) by mouth before breakfast., Disp: 90  tablet, Rfl: 0    aspirin 81 MG Oral Tab EC, aspirin 81 mg tablet,delayed release, [RxNorm: 658826], Disp: , Rfl:     latanoprost 0.005 % Ophthalmic Solution, Place 1 drop into both eyes nightly. Instill 1 drop by ophthalmic route every night into both eyes, Disp: 3 each, Rfl: 3    rosuvastatin 40 MG Oral Tab, Take 1 tablet (40 mg total) by mouth nightly., Disp: 30 tablet, Rfl: 3     Review of Systems  A 10 point review of systems was performed and negative unless otherwise documented per HPI.     Physical Findings   BP (!) 172/98 (BP Location: Right arm, Patient Position: Sitting, Cuff Size: adult)   Pulse 75   Temp 97.4 °F (36.3 °C) (Temporal)   Ht 70\"   Wt 204 lb (92.5 kg)   SpO2 95%   BMI 29.27 kg/m²   Physical Exam  Vitals and nursing note reviewed. Exam conducted with a chaperone present.   Constitutional:       General: He is not in acute distress.  HENT:      Head: Normocephalic and atraumatic.      Mouth/Throat:      Mouth: Mucous membranes are moist.   Cardiovascular:      Rate and Rhythm: Normal rate and regular rhythm.   Pulmonary:      Effort: Pulmonary effort is normal.   Abdominal:      General: There is no distension.      Palpations: Abdomen is soft.      Tenderness: There is no abdominal tenderness.   Genitourinary:     Comments: Patient examined in the prone jackknife position.  External exam of the anus reveals a well-healed left incision in the anterior intersphincteric groove.  The anal margin is soft and nontender.  There is a persistent skin opening in the perineum around 3-4 cm of the anal verge.  The surrounding skin is indurated and I am able to express a small amount of purulent fluid from the external opening.  Musculoskeletal:         General: No deformity.   Skin:     General: Skin is warm and dry.   Neurological:      General: No focal deficit present.      Mental Status: He is alert.   Psychiatric:         Mood and Affect: Mood normal.          Assessment   1. Postoperative  state    2. Anal fistula      This is a very nice 59-year-old gentleman with history of a high transsphincteric right anterior anal fistula who underwent creation of a rectal advancement flap in attempt to repair the anal fistula on 4/19/2024. Unfortunately, the flap failed and the fistula recurred. I took the patient back to the operating room on 8/2/2024 and discovered a recurrent/persistent high transsphincteric anterior anal fistula.  I placed a vessel loop seton.  Most recently, I performed a LIFT procedure on 1/17/2025.  Patient returns for routine postoperative follow-up today.    Patient is continuing to have drainage with itching and burning in his perineum.  He denies any pain or bleeding.  He denies any incontinence.    External exam of the anus reveals a well-healed left incision in the anterior intersphincteric groove.  The anal margin is soft and nontender.  There is a persistent skin opening in the perineum around 3-4 cm of the anal verge.  The surrounding skin is indurated and I am able to express a small amount of purulent fluid from the external opening.    Plan   I counseled patient on my exam findings that the external opening remains open and draining.  That being said, the incision around his anal margin appears to be healing nicely and it is too early to say if the LIFT procedure will be successful in closing the fistula.  Patient was asking for a course of antibiotics to see if this will slow down the drainage and expedite the healing process.  I think this is reasonable and I prescribed a 7-day course of Augmentin.    I recommend ongoing local wound care with dry gauze dressing changes, sitz bath's as needed and over-the-counter pain medication as needed.  I would like see the patient back in the next 1-2 weeks for ongoing follow-up.  Patient is welcome to contact me in the meantime with any new or worsening symptoms.     No orders of the defined types were placed in this  encounter.      Imaging & Referrals   None    Follow Up  No follow-ups on file.    Herman Jiménez MD

## 2025-03-03 ENCOUNTER — OFFICE VISIT (OUTPATIENT)
Facility: LOCATION | Age: 60
End: 2025-03-03
Payer: MEDICAID

## 2025-03-03 VITALS
DIASTOLIC BLOOD PRESSURE: 93 MMHG | SYSTOLIC BLOOD PRESSURE: 159 MMHG | OXYGEN SATURATION: 97 % | TEMPERATURE: 98 F | HEART RATE: 69 BPM | RESPIRATION RATE: 18 BRPM

## 2025-03-03 DIAGNOSIS — K60.30 ANAL FISTULA: Primary | ICD-10-CM

## 2025-03-03 PROCEDURE — 99024 POSTOP FOLLOW-UP VISIT: CPT | Performed by: STUDENT IN AN ORGANIZED HEALTH CARE EDUCATION/TRAINING PROGRAM

## 2025-03-03 NOTE — PROGRESS NOTES
Follow Up Visit Note       Active Problems      1. Anal fistula          Chief Complaint   Chief Complaint   Patient presents with    Post-Op     PO 1/17/25 ANAL EXAMINATION  UNDER ANESTHESIA, LIGATION OF INTERSPHINCTERIC FISTULA TRACT- denies new concerns         History of Present Illness  This is a very nice 59-year-old gentleman with history of a high transsphincteric right anterior anal fistula who underwent creation of a rectal advancement flap in attempt to repair the anal fistula on 4/19/2024. Unfortunately, the flap failed and the fistula recurred. I took the patient back to the operating room on 8/2/2024 and discovered a recurrent/persistent high transsphincteric anterior anal fistula.  I placed a vessel loop seton.  Most recently, I performed a LIFT procedure on 1/17/2025.  I last saw the patient on 2/17/2025.  Patient was having some ongoing drainage and irritation.  I prescribed a short course of antibiotics.  Patient returns for routine postoperative follow-up today.    Patient is doing better overall.  The drainage has greatly reduced.  He is still having some irritation but this is better.  He denies any pain or bleeding.  He denies any incontinence.      Allergies  Arnulfo is allergic to calamari oil [squid oil], grass, and ragweed.    Past Medical / Surgical / Social / Family History    The past medical and past surgical history have been reviewed by me today.    Past Medical History:    Age-related nuclear cataract of both eyes    Allergic rhinitis    Coronary atherosclerosis    Esophageal reflux    Essential hypertension    Glaucoma    started on Latanoprost qhs OS after abnormal OCT result 10/31/15    Heart attack (HCC)    High blood pressure    High cholesterol    Hx of motion sickness    Hyperlipidemia    Lipid screening    Lumbar herniated disc    Physical Therapy     MANAN on CPAP    Pigmentary dispersion syndrome    Pigmentary glaucoma of both eyes, moderate stage    1/30/2020- pt had been lost  to follow up since 2016 and came in with IOP of 27 OU and abnormal VF and OCT OS- and was restarted on Latanoprost OS and started Latanoprost OD due to OHT    PONV (postoperative nausea and vomiting)    Sleep apnea    LAST CPAP USE 2 YRS AGO    Visual impairment    readers     Past Surgical History:   Procedure Laterality Date    Cabg  09/2023    Colonoscopy N/A 09/15/2020    Procedure: COLONOSCOPY;  Surgeon: Bhavesh Beltran MD;  Location: [facility] ENDOSCOPY    Colonoscopy      Colonoscopy N/A 01/16/2024    Dr. Beltran; Colon polyps    Colonoscopy N/A 01/16/2024    Procedure: COLONOSCOPY;  Surgeon: Bhavesh Beltran MD;  Location: [facility] ENDOSCOPY    Electrocardiogram, complete  02/25/2014    Scanned to media tab     Other surgical history  08/02/2024    ANAL EXAMINATION UNDER ANESTHESIA, PARTIAL FISTULOTOMY, SETON PLACEMENT    Removal anal fistula,subcutaneous      Upper gi endoscopy,exam         The family history and social history have been reviewed by me today.    Family History   Problem Relation Age of Onset    Heart Disease Mother     Heart Disease Maternal Grandmother     Diabetes Neg     Glaucoma Neg     Macular degeneration Neg      Social History     Socioeconomic History    Marital status:    Tobacco Use    Smoking status: Never    Smokeless tobacco: Never   Vaping Use    Vaping status: Never Used   Substance and Sexual Activity    Alcohol use: Yes     Alcohol/week: 1.0 standard drink of alcohol     Types: 1 Glasses of wine per week     Comment: OCASSIONAL    Drug use: Never    Sexual activity: Not Currently   Other Topics Concern    Caffeine Concern Yes    Stress Concern No    Weight Concern No    Special Diet No    Exercise No    Seat Belt No    Reaction to local anesthetic No        Current Outpatient Medications:     amoxicillin clavulanate 875-125 MG Oral Tab, Take 1 tablet by mouth 2 (two) times daily., Disp: 14 tablet, Rfl: 0    lidocaine 5 % External Patch, APPLY TO THE AFFECTED  AREA(S) ONE PATCH EVERY DAY AS NEEDED FOR PAIN, Disp: , Rfl:     pantoprazole 40 MG Oral Tab EC, Take 1 tablet (40 mg total) by mouth before breakfast., Disp: 90 tablet, Rfl: 0    aspirin 81 MG Oral Tab EC, aspirin 81 mg tablet,delayed release, [RxNorm: 502175], Disp: , Rfl:     latanoprost 0.005 % Ophthalmic Solution, Place 1 drop into both eyes nightly. Instill 1 drop by ophthalmic route every night into both eyes, Disp: 3 each, Rfl: 3    rosuvastatin 40 MG Oral Tab, Take 1 tablet (40 mg total) by mouth nightly., Disp: 30 tablet, Rfl: 3     Review of Systems  A 10 point review of systems was performed and negative unless otherwise documented per HPI.     Physical Findings   BP (!) 159/93   Pulse 69   Temp 97.9 °F (36.6 °C) (Temporal)   Resp 18   SpO2 97%   Physical Exam  Vitals and nursing note reviewed. Exam conducted with a chaperone present.   Constitutional:       General: He is not in acute distress.  HENT:      Head: Normocephalic and atraumatic.      Mouth/Throat:      Mouth: Mucous membranes are moist.   Cardiovascular:      Rate and Rhythm: Normal rate and regular rhythm.   Pulmonary:      Effort: Pulmonary effort is normal.   Abdominal:      General: There is no distension.      Palpations: Abdomen is soft.      Tenderness: There is no abdominal tenderness.   Genitourinary:     Comments: Patient examined in the prone jackknife position.  External exam of the anus reveals a well-healed left incision in the anterior intersphincteric groove.  The anal margin is soft and nontender.  There is a scar in the perineum around 3-4 cm of the anal verge.  The surrounding skin is indurated and somewhat tender.  There is no expressible drainage.  Musculoskeletal:         General: No deformity.   Skin:     General: Skin is warm and dry.   Neurological:      General: No focal deficit present.      Mental Status: He is alert.   Psychiatric:         Mood and Affect: Mood normal.          Assessment   1. Anal fistula       This is a very nice 59-year-old gentleman with history of a high transsphincteric right anterior anal fistula who underwent creation of a rectal advancement flap in attempt to repair the anal fistula on 4/19/2024. Unfortunately, the flap failed and the fistula recurred. I took the patient back to the operating room on 8/2/2024 and discovered a recurrent/persistent high transsphincteric anterior anal fistula.  I placed a vessel loop seton.  Most recently, I performed a LIFT procedure on 1/17/2025.  I last saw the patient on 2/17/2025.  Patient was having some ongoing drainage and irritation.  I prescribed a short course of antibiotics.  Patient returns for routine postoperative follow-up today.    Patient is doing better overall.  The drainage has greatly reduced.  He is still having some irritation but this is better.  He denies any pain or bleeding.  He denies any incontinence.    External exam of the anus reveals a well-healed left incision in the anterior intersphincteric groove.  The anal margin is soft and nontender.  There is a scar in the perineum around 3-4 cm of the anal verge.  The surrounding skin is indurated and somewhat tender.  There is no expressible drainage.    Plan   I counseled patient on my exam findings that the external opening now appears scarred over and closed without any expressible drainage.  This is encouraging that the fistula appears to be healing.  I recommend ongoing watchful observation.    Patient can continue local wound care with dry gauze dressing changes as needed, sitz bath's as needed and over-the-counter pain medication as needed.  I would like see the patient back in the next 4-6 weeks for ongoing follow-up.  Patient is welcome to contact me in the meantime with any new or worsening symptoms.     No orders of the defined types were placed in this encounter.      Imaging & Referrals   None    Follow Up  No follow-ups on file.    Herman Jiménez MD

## 2025-03-17 ENCOUNTER — TELEPHONE (OUTPATIENT)
Dept: INTERNAL MEDICINE CLINIC | Facility: CLINIC | Age: 60
End: 2025-03-17

## 2025-03-17 NOTE — TELEPHONE ENCOUNTER
Pharmacy requesting PA      Current Outpatient Medications:       pantoprazole 40 MG Oral Tab EC, Take 1 tablet (40 mg total) by mouth before breakfast., Disp: 90 tablet, Rfl: 0    Plan does not cover this medication  Please call [phone]  Patient ID#  [id number]      Max 120 days supply in 365 days

## 2025-03-18 NOTE — TELEPHONE ENCOUNTER
Patient advised he doesn't have a Gastroenterologist and has been through so many surgeries he is not interested in seeing one at this time. He advised he has surgery for a fistula and open heart surgery. I do see in his chart notes from Dr. Desai from 1/18/2024. History of Dyspepsia.     Can a PA be done for Pantoprazole? Or over the counter be recommended?     Patient stated, \"I can't sleep from the sour in my throat at night when lying down.

## 2025-03-18 NOTE — TELEPHONE ENCOUNTER
PPI's are only covered for 120 days unless patient has seen a specialist where biopsies have been obtained and diagnosed with a condition that requires a PPI to be used for longer than 120 days.      Ok to purchase over the counter ? If so advise on over the counter medication and dose

## 2025-03-18 NOTE — TELEPHONE ENCOUNTER
Message received 3/18/25 at 3:24 PM from DAMIEN Simons:    He can try over the counter omeprazole (prilosec), however, this is not meant to be taken for longer than 14 days. If his symptoms continue, he needs to be evaluated in person.  Thanks!      Spoke to patient, full name and date of birth verified.  Informed patient of recommendation as stated above.  Patient verbalized understanding, he will try Prilosec for 14 days.   Patient stated he has been having these problems for a long time.   RN recommended appointment if not improving.

## 2025-03-31 ENCOUNTER — OFFICE VISIT (OUTPATIENT)
Facility: LOCATION | Age: 60
End: 2025-03-31
Payer: MEDICAID

## 2025-03-31 VITALS
DIASTOLIC BLOOD PRESSURE: 101 MMHG | OXYGEN SATURATION: 97 % | SYSTOLIC BLOOD PRESSURE: 151 MMHG | HEART RATE: 84 BPM | TEMPERATURE: 98 F

## 2025-03-31 DIAGNOSIS — K60.30 ANAL FISTULA: Primary | ICD-10-CM

## 2025-03-31 PROCEDURE — 99024 POSTOP FOLLOW-UP VISIT: CPT | Performed by: STUDENT IN AN ORGANIZED HEALTH CARE EDUCATION/TRAINING PROGRAM

## 2025-03-31 NOTE — PROGRESS NOTES
Follow Up Visit Note       Active Problems      1. Anal fistula          Chief Complaint   Chief Complaint   Patient presents with    Post-Op     PO - ANAL EXAMINATION  UNDER ANESTHESIA, LIGATION OF INTERSPHINCTERIC FISTULA TRACT W/ ROMINA 1/17 4 week follow up. No symptoms. Doing much better. Closed        History of Present Illness  This is a very nice 59-year-old gentleman with history of a high transsphincteric right anterior anal fistula who underwent creation of a rectal advancement flap in attempt to repair the anal fistula on 4/19/2024. Unfortunately, the flap failed and the fistula recurred. I took the patient back to the operating room on 8/2/2024 and discovered a recurrent/persistent high transsphincteric anterior anal fistula.  I placed a vessel loop seton.  Most recently, I performed a LIFT procedure on 1/17/2025.  Patient returns for routine postoperative follow-up today.    Patient is doing better overall.  He is no longer having any drainage.  He is still having some minor irritation from the scar near his anal opening but this is not painful or bothersome in general.  He denies any pain or bleeding.  He denies any incontinence.      Allergies  Arnulfo is allergic to grass and ragweed.    Past Medical / Surgical / Social / Family History    The past medical and past surgical history have been reviewed by me today.    Past Medical History:    Age-related nuclear cataract of both eyes    Allergic rhinitis    Coronary atherosclerosis    Esophageal reflux    Essential hypertension    Glaucoma    started on Latanoprost qhs OS after abnormal OCT result 10/31/15    Heart attack (HCC)    High blood pressure    High cholesterol    Hx of motion sickness    Hyperlipidemia    Lipid screening    Lumbar herniated disc    Physical Therapy     MANAN on CPAP    Pigmentary dispersion syndrome    Pigmentary glaucoma of both eyes, moderate stage    1/30/2020- pt had been lost to follow up since 2016 and came in with IOP of 27  OU and abnormal VF and OCT OS- and was restarted on Latanoprost OS and started Latanoprost OD due to OHT    PONV (postoperative nausea and vomiting)    Sleep apnea    LAST CPAP USE 2 YRS AGO    Visual impairment    readers     Past Surgical History:   Procedure Laterality Date    Cabg  09/2023    Colonoscopy N/A 09/15/2020    Procedure: COLONOSCOPY;  Surgeon: Bhavesh Beltran MD;  Location: [facility] ENDOSCOPY    Colonoscopy      Colonoscopy N/A 01/16/2024    Dr. Beltran; Colon polyps    Colonoscopy N/A 01/16/2024    Procedure: COLONOSCOPY;  Surgeon: Bhavesh Beltran MD;  Location: [facility] ENDOSCOPY    Electrocardiogram, complete  02/25/2014    Scanned to media tab     Other surgical history  08/02/2024    ANAL EXAMINATION UNDER ANESTHESIA, PARTIAL FISTULOTOMY, SETON PLACEMENT    Removal anal fistula,subcutaneous      Upper gi endoscopy,exam         The family history and social history have been reviewed by me today.    Family History   Problem Relation Age of Onset    Heart Disease Mother     Heart Disease Maternal Grandmother     Diabetes Neg     Glaucoma Neg     Macular degeneration Neg      Social History     Socioeconomic History    Marital status:    Tobacco Use    Smoking status: Never    Smokeless tobacco: Never   Vaping Use    Vaping status: Never Used   Substance and Sexual Activity    Alcohol use: Yes     Alcohol/week: 1.0 standard drink of alcohol     Types: 1 Glasses of wine per week     Comment: OCASSIONAL    Drug use: Never    Sexual activity: Not Currently   Other Topics Concern    Caffeine Concern Yes    Stress Concern No    Weight Concern No    Special Diet No    Exercise No    Seat Belt No    Reaction to local anesthetic No        Current Outpatient Medications:     pantoprazole 40 MG Oral Tab EC, Take 1 tablet (40 mg total) by mouth before breakfast., Disp: 90 tablet, Rfl: 0    aspirin 81 MG Oral Tab EC, aspirin 81 mg tablet,delayed release, [RxNorm: 987258], Disp: , Rfl:      latanoprost 0.005 % Ophthalmic Solution, Place 1 drop into both eyes nightly. Instill 1 drop by ophthalmic route every night into both eyes, Disp: 3 each, Rfl: 3    rosuvastatin 40 MG Oral Tab, Take 1 tablet (40 mg total) by mouth nightly., Disp: 30 tablet, Rfl: 3     Review of Systems  A 10 point review of systems was performed and negative unless otherwise documented per HPI.     Physical Findings   BP (!) 151/101 (BP Location: Left arm, Patient Position: Sitting, Cuff Size: adult)   Pulse 84   Temp 98 °F (36.7 °C) (Temporal)   SpO2 97%   Physical Exam  Vitals and nursing note reviewed. Exam conducted with a chaperone present.   Constitutional:       General: He is not in acute distress.  HENT:      Head: Normocephalic and atraumatic.      Mouth/Throat:      Mouth: Mucous membranes are moist.   Cardiovascular:      Rate and Rhythm: Normal rate and regular rhythm.   Pulmonary:      Effort: Pulmonary effort is normal.   Abdominal:      General: There is no distension.      Palpations: Abdomen is soft.      Tenderness: There is no abdominal tenderness.   Genitourinary:     Comments: Patient examined in the prone jackknife position.  External exam of the anus reveals a well-healed incision in the anterior intersphincteric groove.  The anal margin is soft and nontender.  There is a small scar in the perineum around 3-4 cm of the anal verge.  The surrounding skin is soft and nontender.  There is no expressible drainage.  Musculoskeletal:         General: No deformity.   Skin:     General: Skin is warm and dry.   Neurological:      General: No focal deficit present.      Mental Status: He is alert.   Psychiatric:         Mood and Affect: Mood normal.          Assessment   1. Anal fistula      This is a very nice 59-year-old gentleman with history of a high transsphincteric right anterior anal fistula who underwent creation of a rectal advancement flap in attempt to repair the anal fistula on 4/19/2024. Unfortunately,  the flap failed and the fistula recurred. I took the patient back to the operating room on 8/2/2024 and discovered a recurrent/persistent high transsphincteric anterior anal fistula.  I placed a vessel loop seton.  Most recently, I performed a LIFT procedure on 1/17/2025.  Patient returns for routine postoperative follow-up today.    Patient is doing better overall.  He is no longer having any drainage.  He is still having some minor irritation from the scar near his anal opening but this is not painful or bothersome in general.  He denies any pain or bleeding.  He denies any incontinence.    External exam of the anus reveals a well-healed incision in the anterior intersphincteric groove.  The anal margin is soft and nontender.  There is a small scar in the perineum around 3-4 cm of the anal verge.  The surrounding skin is soft and nontender.  There is no expressible drainage.    Plan   I counseled patient that his fistula appears to be completely healed.  No bathing, dietary or activity restrictions.  No further follow-up scheduled, but patient is welcome to contact me at anytime in the future with any anorectal or surgical questions/concerns.     No orders of the defined types were placed in this encounter.      Imaging & Referrals   None    Follow Up  No follow-ups on file.    Herman Jiménez MD

## 2025-03-31 NOTE — PROGRESS NOTES
The following individual(s) verbally consented to be recorded using ambient AI listening technology and understand that they can each withdraw their consent to this listening technology at any point by asking the clinician to turn off or pause the recording:    Patient name: Arnulfo Clark  Additional names:  none

## 2025-04-05 ENCOUNTER — TELEPHONE (OUTPATIENT)
Dept: INTERNAL MEDICINE CLINIC | Facility: CLINIC | Age: 60
End: 2025-04-05

## 2025-04-22 ENCOUNTER — OFFICE VISIT (OUTPATIENT)
Dept: INTERNAL MEDICINE CLINIC | Facility: CLINIC | Age: 60
End: 2025-04-22
Payer: MEDICAID

## 2025-04-22 VITALS
RESPIRATION RATE: 18 BRPM | DIASTOLIC BLOOD PRESSURE: 82 MMHG | HEART RATE: 89 BPM | OXYGEN SATURATION: 95 % | WEIGHT: 203 LBS | HEIGHT: 70 IN | TEMPERATURE: 98 F | BODY MASS INDEX: 29.06 KG/M2 | SYSTOLIC BLOOD PRESSURE: 124 MMHG

## 2025-04-22 DIAGNOSIS — Z02.89 ENCOUNTER FOR COMPLETION OF FORM WITH PATIENT: ICD-10-CM

## 2025-04-22 DIAGNOSIS — I10 PRIMARY HYPERTENSION: Primary | ICD-10-CM

## 2025-04-22 PROCEDURE — 99214 OFFICE O/P EST MOD 30 MIN: CPT | Performed by: INTERNAL MEDICINE

## 2025-04-22 NOTE — PROGRESS NOTES
The following individual(s) verbally consented to be recorded using ambient AI listening technology and understand that they can each withdraw their consent to this listening technology at any point by asking the clinician to turn off or pause the recording:     Patient name: Arnulfokarie Clark  Additional names: n/a

## 2025-04-22 NOTE — PROGRESS NOTES
Patient ID: Arnulfo Clark is a 59 year old male.  Chief Complaint   Patient presents with    Follow - Up     Blood pressure follow up        HISTORY OF PRESENT ILLNESS:   HPI  Patient presents for above.  Here for follow-up.    History of hypertension on no medications.  Has seen multiple consultants recently where his blood pressure has been elevated.  In the office today is normal.  He does have a coronary artery history with a non-ST elevation myocardial infarction in the past.  He denies any chest pain or shortness of breath with activities.    History of neck and low back pain.  He is in therapy for this currently.  Would like a handicap placard form for his as he has to walk 7 blocks to get from his parking lot to work.    Review of Systems  Ten point review of systems otherwise negative with the exception of HPI and assessment and plan.    MEDICAL HISTORY:   Past Medical History[1]    Past Surgical History[2]    Medications - Current[3]    Allergies:Allergies[4]    Social History     Socioeconomic History    Marital status:      Spouse name: Not on file    Number of children: Not on file    Years of education: Not on file    Highest education level: Not on file   Occupational History    Not on file   Tobacco Use    Smoking status: Never    Smokeless tobacco: Never   Vaping Use    Vaping status: Never Used   Substance and Sexual Activity    Alcohol use: Yes     Alcohol/week: 1.0 standard drink of alcohol     Types: 1 Glasses of wine per week     Comment: OCASSIONAL    Drug use: Never    Sexual activity: Not Currently   Other Topics Concern     Service Not Asked    Blood Transfusions Not Asked    Caffeine Concern Yes    Occupational Exposure Not Asked    Hobby Hazards Not Asked    Sleep Concern Not Asked    Stress Concern No    Weight Concern No    Special Diet No    Back Care Not Asked    Exercise No    Bike Helmet Not Asked    Seat Belt No    Self-Exams Not Asked    Grew up on a farm Not  Asked    History of tanning Not Asked    Outdoor occupation Not Asked    Pt has a pacemaker Not Asked    Pt has a defibrillator Not Asked    Reaction to local anesthetic No   Social History Narrative    Not on file     Social Drivers of Health     Food Insecurity: Not on file   Transportation Needs: No Transportation Needs (9/11/2023)    Transportation Needs     Lack of Transportation: No   Stress: Not on file   Housing Stability: Not on file           PHYSICAL EXAM:     Vitals:    04/22/25 1724   BP: 124/82   Pulse: 89   Resp: 18   Temp: 97.5 °F (36.4 °C)   TempSrc: Temporal   SpO2: 95%   Weight: 203 lb (92.1 kg)   Height: 5' 10\" (1.778 m)       Body mass index is 29.13 kg/m².    Physical Exam  Constitutional:       Appearance: Normal appearance.   Eyes:      General: No scleral icterus.  Cardiovascular:      Rate and Rhythm: Normal rate and regular rhythm.      Pulses: Normal pulses.      Heart sounds: Normal heart sounds. No murmur heard.  Pulmonary:      Effort: Pulmonary effort is normal. No respiratory distress.      Breath sounds: Normal breath sounds. No stridor. No wheezing or rhonchi.   Abdominal:      General: Abdomen is flat. Bowel sounds are normal.      Palpations: Abdomen is soft.   Neurological:      Mental Status: He is alert.   Psychiatric:         Mood and Affect: Mood normal.         Behavior: Behavior normal.           ASSESSMENT/PLAN:   1. Primary hypertension  Well-controlled.  Will not start any medications.  Low-salt diet.    2. Encounter for completion of form with patient  Handicap placard form completed and will be scanned into chart.    Return in about 7 months (around 11/22/2025) for Complete physical.    This note was prepared using Dragon Medical voice recognition dictation software. As a result errors may occur. When identified these errors have been corrected. While every attempt is made to correct errors during dictation discrepancies may still exist.    Cole Cary MD  4/22/2025        [1]   Past Medical History:   Age-related nuclear cataract of both eyes    Allergic rhinitis    Coronary atherosclerosis    Esophageal reflux    Essential hypertension    Glaucoma    started on Latanoprost qhs OS after abnormal OCT result 10/31/15    Heart attack (HCC)    Hemorrhoids    High blood pressure    High cholesterol    Hx of motion sickness    Hyperlipidemia    Lipid screening    Lumbar herniated disc    Physical Therapy     MANAN on CPAP    Pigmentary dispersion syndrome    Pigmentary glaucoma of both eyes, moderate stage    1/30/2020- pt had been lost to follow up since 2016 and came in with IOP of 27 OU and abnormal VF and OCT OS- and was restarted on Latanoprost OS and started Latanoprost OD due to OHT    PONV (postoperative nausea and vomiting)    Sleep apnea    LAST CPAP USE 2 YRS AGO    Visual impairment    readers   [2]   Past Surgical History:  Procedure Laterality Date    Cabg  09/2023    Colonoscopy N/A 09/15/2020    Procedure: COLONOSCOPY;  Surgeon: Bhavesh Beltran MD;  Location: [facility] ENDOSCOPY    Colonoscopy      Colonoscopy N/A 01/16/2024    Dr. Beltran; Colon polyps    Colonoscopy N/A 01/16/2024    Procedure: COLONOSCOPY;  Surgeon: Bhavesh Beltran MD;  Location: [facility] ENDOSCOPY    Electrocardiogram, complete  02/25/2014    Scanned to media tab     Other surgical history  08/02/2024    ANAL EXAMINATION UNDER ANESTHESIA, PARTIAL FISTULOTOMY, SETON PLACEMENT    Removal anal fistula,subcutaneous      Upper gi endoscopy,exam     [3]   Current Outpatient Medications:     pantoprazole 40 MG Oral Tab EC, Take 1 tablet (40 mg total) by mouth before breakfast., Disp: 90 tablet, Rfl: 0    aspirin 81 MG Oral Tab EC, aspirin 81 mg tablet,delayed release, [RxNorm: 022278], Disp: , Rfl:     latanoprost 0.005 % Ophthalmic Solution, Place 1 drop into both eyes nightly. Instill 1 drop by ophthalmic route every night into both eyes, Disp: 3 each, Rfl: 3    rosuvastatin 40 MG Oral Tab, Take 1  tablet (40 mg total) by mouth nightly., Disp: 30 tablet, Rfl: 3  [4]   Allergies  Allergen Reactions    Grass Runny nose    Ragweed ITCHING

## 2025-04-28 ENCOUNTER — TELEPHONE (OUTPATIENT)
Facility: LOCATION | Age: 60
End: 2025-04-28

## 2025-04-28 ENCOUNTER — OFFICE VISIT (OUTPATIENT)
Facility: LOCATION | Age: 60
End: 2025-04-28
Payer: MEDICAID

## 2025-04-28 VITALS
DIASTOLIC BLOOD PRESSURE: 99 MMHG | HEART RATE: 86 BPM | OXYGEN SATURATION: 94 % | SYSTOLIC BLOOD PRESSURE: 162 MMHG | TEMPERATURE: 97 F

## 2025-04-28 DIAGNOSIS — K60.30 ANAL FISTULA: ICD-10-CM

## 2025-04-28 DIAGNOSIS — L02.215 PERINEAL ABSCESS: Primary | ICD-10-CM

## 2025-04-28 PROCEDURE — 99213 OFFICE O/P EST LOW 20 MIN: CPT | Performed by: STUDENT IN AN ORGANIZED HEALTH CARE EDUCATION/TRAINING PROGRAM

## 2025-04-28 RX ORDER — EZETIMIBE 10 MG/1
10 TABLET ORAL
COMMUNITY
Start: 2025-04-24

## 2025-04-28 NOTE — TELEPHONE ENCOUNTER
Spoke with patient.  Patient reporting bump to old surgical site that is painful and hard.  Patient reporting nausea, denies fever and chills.  Office visit scheduled.

## 2025-04-28 NOTE — PROGRESS NOTES
The following individual(s) verbally consented to be recorded using ambient AI listening technology and understand that they can each withdraw their consent to this listening technology at any point by asking the clinician to turn off or pause the recording:    Patient name: Arnulfo Clark  Additional names:

## 2025-04-28 NOTE — TELEPHONE ENCOUNTER
The patient recently contacted our office reporting that the abscess Dr. Jiménez operated on has shown signs of possible reinfection approximately three months post-surgery. The patient is concerned and has several questions. He has requested to speak with Dr. Jiménez directly regarding his condition.    Call back # [phone]

## 2025-04-29 ENCOUNTER — TELEPHONE (OUTPATIENT)
Facility: LOCATION | Age: 60
End: 2025-04-29

## 2025-04-29 NOTE — TELEPHONE ENCOUNTER
Spoke with patient, reports increase in pain and swelling of perirectal abscess, nausea and vomiting since this morning and abdominal pain all day.  Denies fever, headache, sinus pain, or fatigue.  States he has been taking Augmentin prescribed at [facility] yesterday, \"it is not working.\"  States he has taken Augmentin previously without negative side effects.   He states he cannot wait until appt on Thursday (5/1).  Advised patient to go to ED for evaluation/treatment, and to try to hydrate and to eat some crackers to control nausea.  He verbalized understanding and is agreeable, states he will likely go to ED this evening or tomorrow morning.

## 2025-04-29 NOTE — TELEPHONE ENCOUNTER
Patient calling because he's doing worse, he is vomiting and his stomach hurts. He is wondering if there is time to have the abscess drained soon. He is afraid to do it, but there is no other choice. He has antibiotics, but it grew exponentially.     Please advise  Best callback number is [phone]    Future Appointments   Date Time Provider Department Center   5/1/2025  1:45 PM Herman Jiménez MD [id number]

## 2025-04-29 NOTE — PROGRESS NOTES
Follow Up Visit Note       Active Problems      1. Perineal abscess    2. Anal fistula          Chief Complaint   Chief Complaint   Patient presents with    Follow - Up     EP - ANAL EXAMINATION  UNDER ANESTHESIA, LIGATION OF INTERSPHINCTERIC FISTULA TRACT. hard, painful bump to old surgical site. Feels nauseated.  Just appeared.  Painful with sitting, no drainage.        History of Present Illness  This is a very nice 59-year-old gentleman with history of a high transsphincteric right anterior anal fistula who underwent creation of a rectal advancement flap in attempt to repair the anal fistula on 4/19/2024. Unfortunately, the flap failed and the fistula recurred. I took the patient back to the operating room on 8/2/2024 and discovered a recurrent/persistent high transsphincteric anterior anal fistula.  I placed a vessel loop seton.  Most recently, I performed a LIFT procedure on 1/17/2025.      Patient was doing very well when I last saw him for routine postoperative follow-up on 3/31/2025.  Unfortunately, patient has developed a hard, painful bump in his perineum at the site of his former external fistula opening.  The symptoms started around 2 days ago.  He feels nauseated.  No fevers or drainage.  No rectal pain.      Allergies  Arnulfo is allergic to grass and ragweed.    Past Medical / Surgical / Social / Family History    The past medical and past surgical history have been reviewed by me today.    Past Medical History:    Age-related nuclear cataract of both eyes    Allergic rhinitis    Coronary atherosclerosis    Esophageal reflux    Essential hypertension    Glaucoma    started on Latanoprost qhs OS after abnormal OCT result 10/31/15    Heart attack (HCC)    Hemorrhoids    High blood pressure    High cholesterol    Hx of motion sickness    Hyperlipidemia    Lipid screening    Lumbar herniated disc    Physical Therapy     MANAN on CPAP    Pigmentary dispersion syndrome    Pigmentary glaucoma of both eyes,  moderate stage    1/30/2020- pt had been lost to follow up since 2016 and came in with IOP of 27 OU and abnormal VF and OCT OS- and was restarted on Latanoprost OS and started Latanoprost OD due to OHT    PONV (postoperative nausea and vomiting)    Sleep apnea    LAST CPAP USE 2 YRS AGO    Visual impairment    readers     Past Surgical History:   Procedure Laterality Date    Cabg  09/2023    Colonoscopy N/A 09/15/2020    Procedure: COLONOSCOPY;  Surgeon: Bhavesh Beltran MD;  Location: [facility] ENDOSCOPY    Colonoscopy      Colonoscopy N/A 01/16/2024    Dr. Beltran; Colon polyps    Colonoscopy N/A 01/16/2024    Procedure: COLONOSCOPY;  Surgeon: Bhavesh Beltran MD;  Location: [facility] ENDOSCOPY    Electrocardiogram, complete  02/25/2014    Scanned to media tab     Other surgical history  08/02/2024    ANAL EXAMINATION UNDER ANESTHESIA, PARTIAL FISTULOTOMY, SETON PLACEMENT    Removal anal fistula,subcutaneous      Upper gi endoscopy,exam         The family history and social history have been reviewed by me today.    Family History   Problem Relation Age of Onset    Heart Disease Mother     Stroke Mother     High Cholesterol Mother     Heart Disease Maternal Grandmother     Diabetes Neg     Glaucoma Neg     Macular degeneration Neg      Social History     Socioeconomic History    Marital status:    Tobacco Use    Smoking status: Never    Smokeless tobacco: Never   Vaping Use    Vaping status: Never Used   Substance and Sexual Activity    Alcohol use: Yes     Alcohol/week: 1.0 standard drink of alcohol     Types: 1 Glasses of wine per week     Comment: OCASSIONAL    Drug use: Never    Sexual activity: Not Currently   Other Topics Concern    Caffeine Concern Yes    Stress Concern No    Weight Concern No    Special Diet No    Exercise No    Seat Belt No    Reaction to local anesthetic No        Current Outpatient Medications:     ezetimibe 10 MG Oral Tab, Take 1 tablet (10 mg total) by mouth., Disp: , Rfl:      amoxicillin clavulanate 875-125 MG Oral Tab, Take 1 tablet by mouth 2 (two) times daily., Disp: 14 tablet, Rfl: 0    pantoprazole 40 MG Oral Tab EC, Take 1 tablet (40 mg total) by mouth before breakfast., Disp: 90 tablet, Rfl: 0    aspirin 81 MG Oral Tab EC, , Disp: , Rfl:     latanoprost 0.005 % Ophthalmic Solution, Place 1 drop into both eyes nightly. Instill 1 drop by ophthalmic route every night into both eyes, Disp: 3 each, Rfl: 3    rosuvastatin 40 MG Oral Tab, Take 1 tablet (40 mg total) by mouth nightly., Disp: 30 tablet, Rfl: 3     Review of Systems  A 10 point review of systems was performed and negative unless otherwise documented per HPI.     Physical Findings   BP (!) 162/99 (BP Location: Left arm, Patient Position: Sitting, Cuff Size: adult)   Pulse 86   Temp 97.2 °F (36.2 °C) (Temporal)   SpO2 94%   Physical Exam  Vitals and nursing note reviewed. Exam conducted with a chaperone present.   Constitutional:       General: He is not in acute distress.  HENT:      Head: Normocephalic and atraumatic.      Mouth/Throat:      Mouth: Mucous membranes are moist.   Cardiovascular:      Rate and Rhythm: Normal rate and regular rhythm.   Pulmonary:      Effort: Pulmonary effort is normal.   Abdominal:      General: There is no distension.      Palpations: Abdomen is soft.      Tenderness: There is no abdominal tenderness.   Genitourinary:     Comments: Patient examined in the prone jackknife position.  External exam of the anus reveals a well-healed incision in the anterior intersphincteric groove.  The anal margin is soft and nontender.  There is an approximately 1 cm area of raised induration, fluctuance and tenderness 3-4 cm of the anal verge in the midline perineum.  No expressible drainage.  Musculoskeletal:         General: No deformity.   Skin:     General: Skin is warm and dry.   Neurological:      General: No focal deficit present.      Mental Status: He is alert.   Psychiatric:         Mood and  Affect: Mood normal.          Assessment   1. Perineal abscess    2. Anal fistula      This is a very nice 59-year-old gentleman with history of a high transsphincteric right anterior anal fistula who underwent creation of a rectal advancement flap in attempt to repair the anal fistula on 4/19/2024. Unfortunately, the flap failed and the fistula recurred. I took the patient back to the operating room on 8/2/2024 and discovered a recurrent/persistent high transsphincteric anterior anal fistula.  I placed a vessel loop seton.  Most recently, I performed a LIFT procedure on 1/17/2025.      Patient was doing very well when I last saw him for routine postoperative follow-up on 3/31/2025.  Unfortunately, patient has developed a hard, painful bump in his perineum at the site of his former external fistula opening.  Bedside examination concerning for development of a small, recurrent perineal abscess.    Plan   I counseled patient that unfortunately he appears to have developed a recurrent, small perineal abscess.  Treatment options would include antibiotics and warm compresses versus bedside incision and drainage.  Patient was interested in trying antibiotics before having the area drained.  I prescribed a 7-day course of Augmentin.  I would like see the patient back in 3 days for ongoing follow-up.  If the abscess does not improve or begin spontaneously draining, I will plan to perform a bedside incision and drainage procedure with local anesthesia at that time.  Patient should contact me in the meantime with any new or worsening symptoms.  Patient expressed understanding and was agreeable to plan.     No orders of the defined types were placed in this encounter.      Imaging & Referrals   None    Follow Up  No follow-ups on file.    Herman Jiménez MD

## 2025-05-01 ENCOUNTER — OFFICE VISIT (OUTPATIENT)
Facility: LOCATION | Age: 60
End: 2025-05-01
Payer: MEDICAID

## 2025-05-01 VITALS
DIASTOLIC BLOOD PRESSURE: 101 MMHG | HEART RATE: 81 BPM | SYSTOLIC BLOOD PRESSURE: 166 MMHG | OXYGEN SATURATION: 98 % | TEMPERATURE: 97 F | RESPIRATION RATE: 18 BRPM

## 2025-05-01 DIAGNOSIS — L02.215 PERINEAL ABSCESS: Primary | ICD-10-CM

## 2025-05-01 DIAGNOSIS — K60.30 ANAL FISTULA: ICD-10-CM

## 2025-05-01 PROCEDURE — 99212 OFFICE O/P EST SF 10 MIN: CPT | Performed by: STUDENT IN AN ORGANIZED HEALTH CARE EDUCATION/TRAINING PROGRAM

## 2025-05-01 NOTE — PROGRESS NOTES
Follow Up Visit Note       Active Problems      1. Perineal abscess    2. Anal fistula          Chief Complaint   Chief Complaint   Patient presents with    [facility]     EP- ANAL EXAMINATION  UNDER ANESTHESIA, LIGATION OF INTERSPHINCTERIC FISTULA TRACT on 1/17/25- reports a hard, painful bump        History of Present Illness  This is a very nice 59-year-old gentleman with history of a high transsphincteric right anterior anal fistula who underwent creation of a rectal advancement flap in attempt to repair the anal fistula on 4/19/2024. Unfortunately, the flap failed and the fistula recurred. I took the patient back to the operating room on 8/2/2024 and discovered a recurrent/persistent high transsphincteric anterior anal fistula.  I placed a vessel loop seton.  Most recently, I performed a LIFT procedure on 1/17/2025.      Patient was doing very well when I last saw him for routine postoperative follow-up on 3/31/2025.  Unfortunately, patient has developed a hard, painful bump in his perineum at the site of his former external fistula opening when I last saw him on 4/28/2025.  Patient elected for antibiotic treatment with plans for close follow-up and possible incision and drainage if the symptoms persist or worsen despite antibiotics.  Patient returns for close follow-up today.    The perineal abscess spontaneously drained 2 days ago.  Patient starting to feel little better.  He stated he felt weak, dizzy and nauseous before the abscess began draining.  He is having some ongoing drainage.  The pain is now improved.      Allergies  Arnulfo is allergic to grass and ragweed.    Past Medical / Surgical / Social / Family History    The past medical and past surgical history have been reviewed by me today.    Past Medical History:    Age-related nuclear cataract of both eyes    Allergic rhinitis    Coronary atherosclerosis    Esophageal reflux    Essential hypertension    Glaucoma    started on Latanoprost qhs OS  after abnormal OCT result 10/31/15    Heart attack (HCC)    Hemorrhoids    High blood pressure    High cholesterol    Hx of motion sickness    Hyperlipidemia    Lipid screening    Lumbar herniated disc    Physical Therapy     MANAN on CPAP    Pigmentary dispersion syndrome    Pigmentary glaucoma of both eyes, moderate stage    1/30/2020- pt had been lost to follow up since 2016 and came in with IOP of 27 OU and abnormal VF and OCT OS- and was restarted on Latanoprost OS and started Latanoprost OD due to OHT    PONV (postoperative nausea and vomiting)    Sleep apnea    LAST CPAP USE 2 YRS AGO    Visual impairment    readers     Past Surgical History:   Procedure Laterality Date    Cabg  09/2023    Colonoscopy N/A 09/15/2020    Procedure: COLONOSCOPY;  Surgeon: Bhavesh Beltran MD;  Location: [facility] ENDOSCOPY    Colonoscopy      Colonoscopy N/A 01/16/2024    Dr. Beltran; Colon polyps    Colonoscopy N/A 01/16/2024    Procedure: COLONOSCOPY;  Surgeon: Bhavesh Beltran MD;  Location: [facility] ENDOSCOPY    Electrocardiogram, complete  02/25/2014    Scanned to media tab     Other surgical history  08/02/2024    ANAL EXAMINATION UNDER ANESTHESIA, PARTIAL FISTULOTOMY, SETON PLACEMENT    Other surgical history  01/17/2025    ANAL EXAMINATION  UNDER ANESTHESIA, LIGATION OF INTERSPHINCTERIC FISTULA TRACT    Removal anal fistula,subcutaneous      Upper gi endoscopy,exam         The family history and social history have been reviewed by me today.    Family History   Problem Relation Age of Onset    Heart Disease Mother     Stroke Mother     High Cholesterol Mother     Heart Disease Maternal Grandmother     Diabetes Neg     Glaucoma Neg     Macular degeneration Neg      Social History     Socioeconomic History    Marital status:    Tobacco Use    Smoking status: Never    Smokeless tobacco: Never   Vaping Use    Vaping status: Never Used   Substance and Sexual Activity    Alcohol use: Yes     Alcohol/week: 1.0 standard  drink of alcohol     Types: 1 Glasses of wine per week     Comment: OCASSIONAL    Drug use: Never    Sexual activity: Not Currently   Other Topics Concern    Caffeine Concern Yes    Stress Concern No    Weight Concern No    Special Diet No    Exercise No    Seat Belt No    Reaction to local anesthetic No        Current Outpatient Medications:     ezetimibe 10 MG Oral Tab, Take 1 tablet (10 mg total) by mouth., Disp: , Rfl:     amoxicillin clavulanate 875-125 MG Oral Tab, Take 1 tablet by mouth 2 (two) times daily., Disp: 14 tablet, Rfl: 0    pantoprazole 40 MG Oral Tab EC, Take 1 tablet (40 mg total) by mouth before breakfast., Disp: 90 tablet, Rfl: 0    aspirin 81 MG Oral Tab EC, , Disp: , Rfl:     latanoprost 0.005 % Ophthalmic Solution, Place 1 drop into both eyes nightly. Instill 1 drop by ophthalmic route every night into both eyes, Disp: 3 each, Rfl: 3    rosuvastatin 40 MG Oral Tab, Take 1 tablet (40 mg total) by mouth nightly., Disp: 30 tablet, Rfl: 3     Review of Systems  A 10 point review of systems was performed and negative unless otherwise documented per HPI.     Physical Findings   BP (!) 166/101   Pulse 81   Temp 97.4 °F (36.3 °C) (Temporal)   Resp 18   SpO2 98%   Physical Exam  Vitals and nursing note reviewed. Exam conducted with a chaperone present.   Constitutional:       General: He is not in acute distress.  HENT:      Head: Normocephalic and atraumatic.      Mouth/Throat:      Mouth: Mucous membranes are moist.   Cardiovascular:      Rate and Rhythm: Normal rate and regular rhythm.   Pulmonary:      Effort: Pulmonary effort is normal.   Abdominal:      General: There is no distension.      Palpations: Abdomen is soft.      Tenderness: There is no abdominal tenderness.   Genitourinary:     Comments: Patient examined in the prone jackknife position.  External exam of the anus reveals a well-healed incision in the anterior intersphincteric groove.  The anal margin is soft and nontender.   There is an approximately 1 cm open wound with scant serous drainage and granulation tissue at the base 3-4 cm of the anal verge in the midline perineum.  There is some mild tenderness around the wound.  Musculoskeletal:         General: No deformity.   Skin:     General: Skin is warm and dry.   Neurological:      General: No focal deficit present.      Mental Status: He is alert.   Psychiatric:         Mood and Affect: Mood normal.          Assessment   1. Perineal abscess    2. Anal fistula      This is a very nice 59-year-old gentleman with history of a high transsphincteric right anterior anal fistula who underwent creation of a rectal advancement flap in attempt to repair the anal fistula on 4/19/2024. Unfortunately, the flap failed and the fistula recurred. I took the patient back to the operating room on 8/2/2024 and discovered a recurrent/persistent high transsphincteric anterior anal fistula.  I placed a vessel loop seton.  Most recently, I performed a LIFT procedure on 1/17/2025.      Patient was doing very well when I last saw him for routine postoperative follow-up on 3/31/2025.  Unfortunately, patient has developed a hard, painful bump in his perineum at the site of his former external fistula opening when I last saw him on 4/28/2025.  Patient elected for antibiotic treatment with plans for close follow-up and possible incision and drainage if the symptoms persist or worsen despite antibiotics.  Patient returns for close follow-up today.    The perineal abscess spontaneously drained 2 days ago.  Patient starting to feel little better.  He stated he felt weak, dizzy and nauseous before the abscess began draining.  He is having some ongoing drainage.  The pain is now improved.    External exam of the anus reveals a well-healed incision in the anterior intersphincteric groove.  The anal margin is soft and nontender.  There is an approximately 1 cm open wound with scant serous drainage and granulation  tissue at the base 3-4 cm of the anal verge in the midline perineum.  There is some mild tenderness around the wound.      Plan   I counseled patient that unfortunately he appears to have developed a recurrent, small perineal abscess at the site of his previous external fistula opening.  The abscess opened spontaneously and appears to be sufficiently drained on exam today.    I recommend continuing a short course of antibiotics as prescribed until completion.  I recommend ongoing local wound care with sitz bath's and dry gauze dressing changes.  Patient can take over-the-counter pain medication as needed.    I would like see the patient back in 2 weeks for ongoing follow-up.  If patient has persistent drainage and a nonhealing wound, this will raise my suspicion for a recurrent anal fistula.  Otherwise if the area again heals and stops draining, I would recommend ongoing watchful observation.  Patient should contact me in the meantime with any new or worsening symptoms.  Patient expressed understanding and was agreeable to plan.     No orders of the defined types were placed in this encounter.      Imaging & Referrals   None    Follow Up  No follow-ups on file.    Herman Jiménez MD

## 2025-05-01 NOTE — PROGRESS NOTES
The following individual(s) verbally consented to be recorded using ambient AI listening technology and understand that they can each withdraw their consent to this listening technology at any point by asking the clinician to turn off or pause the recording:    Patient name: Arnulfo Clark

## 2025-05-12 ENCOUNTER — TELEPHONE (OUTPATIENT)
Dept: INTERNAL MEDICINE CLINIC | Facility: CLINIC | Age: 60
End: 2025-05-12

## 2025-05-19 ENCOUNTER — OFFICE VISIT (OUTPATIENT)
Facility: LOCATION | Age: 60
End: 2025-05-19
Payer: MEDICAID

## 2025-05-19 VITALS
OXYGEN SATURATION: 95 % | SYSTOLIC BLOOD PRESSURE: 151 MMHG | HEART RATE: 88 BPM | DIASTOLIC BLOOD PRESSURE: 96 MMHG | TEMPERATURE: 98 F

## 2025-05-19 DIAGNOSIS — L02.215 PERINEAL ABSCESS: Primary | ICD-10-CM

## 2025-05-19 DIAGNOSIS — K60.30 ANAL FISTULA: ICD-10-CM

## 2025-05-21 NOTE — PROGRESS NOTES
Follow Up Visit Note    The following individual(s) verbally consented to be recorded using ambient AI listening technology and understand that they can each withdraw their consent to this listening technology at any point by asking the clinician to turn off or pause the recording:    Patient name: Arnulfo Clark     Active Problems      1. Perineal abscess    2. Anal fistula          Chief Complaint   Chief Complaint   Patient presents with    [facility]     EP- perianal abscess, 2 week f/u. Pt reports the abscess has resolved. Pt denies any bleeding, drainage, pain, fevers, or chills.          History of Present Illness  This is a very nice 60-year-old gentleman with history of a high transsphincteric right anterior anal fistula who underwent creation of a rectal advancement flap in attempt to repair the anal fistula on 4/19/2024. Unfortunately, the flap failed and the fistula recurred. I took the patient back to the operating room on 8/2/2024 and discovered a recurrent/persistent high transsphincteric anterior anal fistula.  I placed a vessel loop seton.  Most recently, I performed a LIFT procedure on 1/17/2025.       Patient was doing very well when I last saw him for routine postoperative follow-up on 3/31/2025.  Unfortunately, patient has developed a hard, painful bump in his perineum at the site of his former external fistula opening when I last saw him on 4/28/2025.  This was treated with oral antibiotics and ultimately the patient had spontaneous drainage and was doing better when I saw him on 5/1/2025.  Patient returns for follow-up today.    Patient believes the abscess has completely resolved at this time.  He is no longer having any pain, drainage or bleeding.  No fevers.  He is having regular bowel movements and denies any fecal incontinence.  He does endorse a sensitivity or mild discomfort around his anal verge in the area of the LIFT incision.      Allergies  Arnulfo is allergic to grass and  ragweed.    Past Medical / Surgical / Social / Family History    The past medical and past surgical history have been reviewed by me today.    Past Medical History[1]  Past Surgical History[2]    The family history and social history have been reviewed by me today.    Family History[3]  Social Hx on file[4]   Medications - Current[5]     Review of Systems  A 10 point review of systems was performed and negative unless otherwise documented per HPI.     Physical Findings   BP (!) 151/96 (BP Location: Right arm, Patient Position: Sitting, Cuff Size: adult)   Pulse 88   Temp 98.2 °F (36.8 °C) (Temporal)   SpO2 95%   Physical Exam  Vitals and nursing note reviewed. Exam conducted with a chaperone present.   Constitutional:       General: He is not in acute distress.  HENT:      Head: Normocephalic and atraumatic.      Mouth/Throat:      Mouth: Mucous membranes are moist.   Cardiovascular:      Rate and Rhythm: Normal rate and regular rhythm.   Pulmonary:      Effort: Pulmonary effort is normal.   Abdominal:      General: There is no distension.      Palpations: Abdomen is soft.      Tenderness: There is no abdominal tenderness.   Genitourinary:     Comments: Patient examined in the prone jackknife position.  External exam of the anus reveals a well-healed scar in the perineum.  There is no tenderness, surrounding skin changes or expressible drainage from the scar.  There is a well-healed surgical scar at the anal margin in the area that is creating some tautness and discomfort of the skin when the anoderm is stretched.  No drainage.  Musculoskeletal:         General: No deformity.   Skin:     General: Skin is warm and dry.   Neurological:      General: No focal deficit present.      Mental Status: He is alert.   Psychiatric:         Mood and Affect: Mood normal.         Assessment & Plan  This is a very nice 60-year-old gentleman with history of a high transsphincteric right anterior anal fistula who underwent  creation of a rectal advancement flap in attempt to repair the anal fistula on 4/19/2024. Unfortunately, the flap failed and the fistula recurred. I took the patient back to the operating room on 8/2/2024 and discovered a recurrent/persistent high transsphincteric anterior anal fistula.  I placed a vessel loop seton.  Most recently, I performed a LIFT procedure on 1/17/2025.       Patient was doing very well when I last saw him for routine postoperative follow-up on 3/31/2025.  Unfortunately, patient has developed a hard, painful bump in his perineum at the site of his former external fistula opening when I last saw him on 4/28/2025.  This was treated with oral antibiotics and ultimately the patient had spontaneous drainage and was doing better when I saw him on 5/1/2025.  Patient returns for follow-up today.    Patient is not doing well again and essentially asymptomatic. External exam of the anus reveals a well-healed scar in the perineum.  There is no tenderness, surrounding skin changes or expressible drainage from the scar.  There is a well-healed surgical scar at the anal margin in the area that is creating some tautness and discomfort of the skin when the anoderm is stretched.  No drainage.    Plan:  I counseled patient that his fistula and the recurrent perineal abscess appears to be completely healed.  No bathing, dietary or activity restrictions.  Patient asked me to prescribe a short course of antibiotics in the event that he has recurrent symptoms suggestive of an abscess in the future.  This is reasonable given his history of recurrent fistula and abscess.  Patient instructed not to take the antibiotics unless he develops new or worsening symptoms.  Patient and I agreed for ongoing follow-up in around 2 months time.  Patient should contact me in the meantime with any anorectal or surgical questions/concerns.             No orders of the defined types were placed in this encounter.      Imaging &  Referrals   None    Follow Up  No follow-ups on file.    Herman Jiménez MD           [1]   Past Medical History:   Age-related nuclear cataract of both eyes    Allergic rhinitis    Coronary atherosclerosis    Esophageal reflux    Essential hypertension    Glaucoma    started on Latanoprost qhs OS after abnormal OCT result 10/31/15    Heart attack (HCC)    Hemorrhoids    High blood pressure    High cholesterol    Hx of motion sickness    Hyperlipidemia    Lipid screening    Lumbar herniated disc    Physical Therapy     MANAN on CPAP    Pigmentary dispersion syndrome    Pigmentary glaucoma of both eyes, moderate stage    1/30/2020- pt had been lost to follow up since 2016 and came in with IOP of 27 OU and abnormal VF and OCT OS- and was restarted on Latanoprost OS and started Latanoprost OD due to OHT    PONV (postoperative nausea and vomiting)    Sleep apnea    LAST CPAP USE 2 YRS AGO    Visual impairment    readers   [2]   Past Surgical History:  Procedure Laterality Date    Cabg  09/2023    Colonoscopy N/A 09/15/2020    Procedure: COLONOSCOPY;  Surgeon: Bhavesh Beltran MD;  Location: [facility] ENDOSCOPY    Colonoscopy      Colonoscopy N/A 01/16/2024    Dr. Beltran; Colon polyps    Colonoscopy N/A 01/16/2024    Procedure: COLONOSCOPY;  Surgeon: Bhavesh Beltran MD;  Location: [facility] ENDOSCOPY    Electrocardiogram, complete  02/25/2014    Scanned to media tab     Other surgical history  08/02/2024    ANAL EXAMINATION UNDER ANESTHESIA, PARTIAL FISTULOTOMY, SETON PLACEMENT    Other surgical history  01/17/2025    ANAL EXAMINATION  UNDER ANESTHESIA, LIGATION OF INTERSPHINCTERIC FISTULA TRACT    Removal anal fistula,subcutaneous      Upper gi endoscopy,exam     [3]   Family History  Problem Relation Age of Onset    Heart Disease Mother     Stroke Mother     High Cholesterol Mother     Heart Disease Maternal Grandmother     Diabetes Neg     Glaucoma Neg     Macular degeneration Neg    [4]   Social  History  Socioeconomic History    Marital status:    Tobacco Use    Smoking status: Never    Smokeless tobacco: Never   Vaping Use    Vaping status: Never Used   Substance and Sexual Activity    Alcohol use: Yes     Alcohol/week: 1.0 standard drink of alcohol     Types: 1 Glasses of wine per week     Comment: OCASSIONAL    Drug use: Never    Sexual activity: Not Currently   Other Topics Concern    Caffeine Concern Yes    Stress Concern No    Weight Concern No    Special Diet No    Exercise No    Seat Belt No    Reaction to local anesthetic No   [5]   Current Outpatient Medications:     amoxicillin clavulanate 875-125 MG Oral Tab, Take 1 tablet by mouth 2 (two) times daily., Disp: 14 tablet, Rfl: 0    ezetimibe 10 MG Oral Tab, Take 1 tablet (10 mg total) by mouth., Disp: , Rfl:     amoxicillin clavulanate 875-125 MG Oral Tab, Take 1 tablet by mouth 2 (two) times daily., Disp: 14 tablet, Rfl: 0    pantoprazole 40 MG Oral Tab EC, Take 1 tablet (40 mg total) by mouth before breakfast., Disp: 90 tablet, Rfl: 0    aspirin 81 MG Oral Tab EC, , Disp: , Rfl:     latanoprost 0.005 % Ophthalmic Solution, Place 1 drop into both eyes nightly. Instill 1 drop by ophthalmic route every night into both eyes, Disp: 3 each, Rfl: 3    rosuvastatin 40 MG Oral Tab, Take 1 tablet (40 mg total) by mouth nightly., Disp: 30 tablet, Rfl: 3

## 2025-06-05 NOTE — ASSESSMENT & PLAN NOTE
IOP is stable. Continue Latanoprost at bedtime in both eyes.      We will have patient back in 4 months for a pressure check incidental finding of bilateral adrenal nodule on CT- patient informed of this and advised to follow up outpatient with PMD

## 2025-06-30 ENCOUNTER — OFFICE VISIT (OUTPATIENT)
Facility: LOCATION | Age: 60
End: 2025-06-30
Payer: MEDICAID

## 2025-06-30 DIAGNOSIS — K60.30 ANAL FISTULA: Primary | ICD-10-CM

## 2025-06-30 PROCEDURE — 99212 OFFICE O/P EST SF 10 MIN: CPT | Performed by: STUDENT IN AN ORGANIZED HEALTH CARE EDUCATION/TRAINING PROGRAM

## 2025-06-30 RX ORDER — DIBUCAINE 1% 1 G/100G
1 CREAM TOPICAL 3 TIMES DAILY
Qty: 28 G | Refills: 2 | Status: SHIPPED | OUTPATIENT
Start: 2025-06-30

## 2025-07-02 NOTE — PROGRESS NOTES
Follow Up Visit Note    The following individual(s) verbally consented to be recorded using ambient AI listening technology and understand that they can each withdraw their consent to this listening technology at any point by asking the clinician to turn off or pause the recording:    Patient name: Arnulfo Clark     Active Problems      1. Anal fistula          Chief Complaint   Chief Complaint   Patient presents with    [facility]     EP- 1 month f/u 1/17/25 ANAL EXAMINATION  UNDER ANESTHESIA, LIGATION OF INTERSPHINCTERIC FISTULA TRACT         History of Present Illness  This is a very nice 60-year-old gentleman with history of a high transsphincteric right anterior anal fistula who underwent creation of a rectal advancement flap in attempt to repair the anal fistula on 4/19/2024. Unfortunately, the flap failed and the fistula recurred. I took the patient back to the operating room on 8/2/2024 and discovered a recurrent/persistent high transsphincteric anterior anal fistula.  I placed a vessel loop seton.  Most recently, I performed a LIFT procedure on 1/17/2025.       Patient has developed a hard, painful bump in his perineum at the site of his former external fistula opening when I last saw him on 4/28/2025.  This was treated with oral antibiotics and ultimately the patient had spontaneous drainage and was doing better when I last saw him on 5/19/2025.  Patient returns for follow-up today.    Patient is understandably frustrated today.  I was running behind in clinic and he had to wait over an hour from his scheduled appointment time to see me.  I apologized for his prolonged wait.    Patient is doing well overall from an anorectal standpoint. He is no longer having any pain, drainage or bleeding.  No fevers.  He is having regular bowel movements and denies any fecal incontinence.  He does endorse a sensitivity or mild discomfort around his anal verge in the area of the LIFT incision.     Allergies  Arnulfo  is allergic to grass and ragweed.    Past Medical / Surgical / Social / Family History    The past medical and past surgical history have been reviewed by me today.    Past Medical History[1]  Past Surgical History[2]    The family history and social history have been reviewed by me today.    Family History[3]  Social Hx on file[4]   Medications - Current[5]     Review of Systems  A 10 point review of systems was performed and negative unless otherwise documented per HPI.     Physical Findings   There were no vitals taken for this visit.  Physical Exam  Vitals and nursing note reviewed. Exam conducted with a chaperone present.   Constitutional:       General: He is not in acute distress.  HENT:      Head: Normocephalic and atraumatic.      Mouth/Throat:      Mouth: Mucous membranes are moist.   Cardiovascular:      Rate and Rhythm: Normal rate and regular rhythm.   Pulmonary:      Effort: Pulmonary effort is normal.   Abdominal:      General: There is no distension.      Palpations: Abdomen is soft.      Tenderness: There is no abdominal tenderness.   Genitourinary:     Comments: Patient examined in the prone jackknife position.  External exam of the anus reveals a well-healed scar in the perineum.  There is no tenderness, surrounding skin changes or expressible drainage from the scar.  There is a well-healed surgical scar at the anal margin in the area that is creating some tautness and discomfort of the skin when the anoderm is stretched.  No drainage.  Musculoskeletal:         General: No deformity.   Skin:     General: Skin is warm and dry.   Neurological:      General: No focal deficit present.      Mental Status: He is alert.   Psychiatric:         Mood and Affect: Mood normal.         Assessment & Plan  This is a very nice 60-year-old gentleman with history of a high transsphincteric right anterior anal fistula who underwent creation of a rectal advancement flap in attempt to repair the anal fistula on  4/19/2024. Unfortunately, the flap failed and the fistula recurred. I took the patient back to the operating room on 8/2/2024 and discovered a recurrent/persistent high transsphincteric anterior anal fistula.  I placed a vessel loop seton.  Most recently, I performed a LIFT procedure on 1/17/2025.       Patient has developed a hard, painful bump in his perineum at the site of his former external fistula opening when I last saw him on 4/28/2025.  This was treated with oral antibiotics and ultimately the patient had spontaneous drainage and was doing better when I last saw him on 5/19/2025.  Patient returns for follow-up today.    Patient is doing well overall from an anorectal standpoint. He is no longer having any pain, drainage or bleeding.  No fevers.  He is having regular bowel movements and denies any fecal incontinence.  He does endorse a sensitivity or mild discomfort around his anal verge in the area of the LIFT incision.     External exam of the anus reveals a well-healed scar in the perineum.  There is no tenderness, surrounding skin changes or expressible drainage from the scar.  There is a well-healed surgical scar at the anal margin in the area that is creating some tautness and discomfort of the skin when the anoderm is stretched.  No drainage.    Plan:  - I counseled patient that his fistula and the recurrent perineal abscess appears to be completely healed.  No bathing, dietary or activity restrictions.    - Patient is asked if there is something that can help with his perianal irritation.  I prescribed him Dibucaine ointment.  - Patiently is currently doing sitz baths with a tea supplement that one of his providers in [facility] suggested.  I told patient that he can stop these at his discretion but it is also safe to continue if he feels these are helping.  - Patient and I agreed for ongoing follow-up in around 3 months time.  Patient should contact me in the meantime with any anorectal or surgical  questions/concerns.         No orders of the defined types were placed in this encounter.      Imaging & Referrals   None    Follow Up  No follow-ups on file.    Herman Jiménez MD           [1]   Past Medical History:   Age-related nuclear cataract of both eyes    Allergic rhinitis    Coronary atherosclerosis    Esophageal reflux    Essential hypertension    Glaucoma    started on Latanoprost qhs OS after abnormal OCT result 10/31/15    Heart attack (HCC)    Hemorrhoids    High blood pressure    High cholesterol    Hx of motion sickness    Hyperlipidemia    Lipid screening    Lumbar herniated disc    Physical Therapy     MANAN on CPAP    Pigmentary dispersion syndrome    Pigmentary glaucoma of both eyes, moderate stage    1/30/2020- pt had been lost to follow up since 2016 and came in with IOP of 27 OU and abnormal VF and OCT OS- and was restarted on Latanoprost OS and started Latanoprost OD due to OHT    PONV (postoperative nausea and vomiting)    Sleep apnea    LAST CPAP USE 2 YRS AGO    Visual impairment    readers   [2]   Past Surgical History:  Procedure Laterality Date    Cabg  09/2023    Colonoscopy N/A 09/15/2020    Procedure: COLONOSCOPY;  Surgeon: Bhavesh Beltran MD;  Location: [facility] ENDOSCOPY    Colonoscopy      Colonoscopy N/A 01/16/2024    Dr. Beltran; Colon polyps    Colonoscopy N/A 01/16/2024    Procedure: COLONOSCOPY;  Surgeon: Bhavesh Beltran MD;  Location: [facility] ENDOSCOPY    Electrocardiogram, complete  02/25/2014    Scanned to media tab     Other surgical history  08/02/2024    ANAL EXAMINATION UNDER ANESTHESIA, PARTIAL FISTULOTOMY, SETON PLACEMENT    Other surgical history  01/17/2025    ANAL EXAMINATION  UNDER ANESTHESIA, LIGATION OF INTERSPHINCTERIC FISTULA TRACT    Removal anal fistula,subcutaneous      Upper gi endoscopy,exam     [3]   Family History  Problem Relation Age of Onset    Heart Disease Mother     Stroke Mother     High Cholesterol Mother     Heart Disease Maternal  Grandmother     Diabetes Neg     Glaucoma Neg     Macular degeneration Neg    [4]   Social History  Socioeconomic History    Marital status:    Tobacco Use    Smoking status: Never    Smokeless tobacco: Never   Vaping Use    Vaping status: Never Used   Substance and Sexual Activity    Alcohol use: Yes     Alcohol/week: 1.0 standard drink of alcohol     Types: 1 Glasses of wine per week     Comment: OCASSIONAL    Drug use: Never    Sexual activity: Not Currently   Other Topics Concern    Caffeine Concern Yes    Stress Concern No    Weight Concern No    Special Diet No    Exercise No    Seat Belt No    Reaction to local anesthetic No   [5]   Current Outpatient Medications:     dibucaine 1 % External Ointment, Apply 1 Application topically 3 (three) times daily., Disp: 28 g, Rfl: 2    amoxicillin clavulanate 875-125 MG Oral Tab, Take 1 tablet by mouth 2 (two) times daily., Disp: 14 tablet, Rfl: 0    ezetimibe 10 MG Oral Tab, Take 1 tablet (10 mg total) by mouth., Disp: , Rfl:     amoxicillin clavulanate 875-125 MG Oral Tab, Take 1 tablet by mouth 2 (two) times daily., Disp: 14 tablet, Rfl: 0    pantoprazole 40 MG Oral Tab EC, Take 1 tablet (40 mg total) by mouth before breakfast., Disp: 90 tablet, Rfl: 0    aspirin 81 MG Oral Tab EC, , Disp: , Rfl:     latanoprost 0.005 % Ophthalmic Solution, Place 1 drop into both eyes nightly. Instill 1 drop by ophthalmic route every night into both eyes, Disp: 3 each, Rfl: 3    rosuvastatin 40 MG Oral Tab, Take 1 tablet (40 mg total) by mouth nightly., Disp: 30 tablet, Rfl: 3

## 2025-08-27 ENCOUNTER — OFFICE VISIT (OUTPATIENT)
Dept: INTERNAL MEDICINE CLINIC | Facility: CLINIC | Age: 60
End: 2025-08-27

## 2025-08-27 VITALS
BODY MASS INDEX: 29 KG/M2 | OXYGEN SATURATION: 96 % | SYSTOLIC BLOOD PRESSURE: 145 MMHG | HEART RATE: 80 BPM | DIASTOLIC BLOOD PRESSURE: 80 MMHG | WEIGHT: 205 LBS

## 2025-08-27 DIAGNOSIS — M54.41 ACUTE LOW BACK PAIN WITH BILATERAL SCIATICA, UNSPECIFIED BACK PAIN LATERALITY: Primary | ICD-10-CM

## 2025-08-27 DIAGNOSIS — M54.42 ACUTE LOW BACK PAIN WITH BILATERAL SCIATICA, UNSPECIFIED BACK PAIN LATERALITY: Primary | ICD-10-CM

## 2025-08-27 PROCEDURE — 99214 OFFICE O/P EST MOD 30 MIN: CPT | Performed by: INTERNAL MEDICINE

## 2025-08-27 RX ORDER — CYCLOBENZAPRINE HCL 5 MG
5 TABLET ORAL NIGHTLY PRN
Qty: 20 TABLET | Refills: 0 | Status: SHIPPED | OUTPATIENT
Start: 2025-08-27

## 2025-08-27 RX ORDER — LISINOPRIL 5 MG/1
5 TABLET ORAL DAILY
Qty: 90 TABLET | Refills: 0 | Status: SHIPPED | OUTPATIENT
Start: 2025-08-27

## 2025-08-27 RX ORDER — METHYLPREDNISOLONE 4 MG/1
TABLET ORAL
Qty: 1 EACH | Refills: 0 | Status: SHIPPED | OUTPATIENT
Start: 2025-08-27

## 2025-08-28 ENCOUNTER — HOSPITAL ENCOUNTER (OUTPATIENT)
Dept: GENERAL RADIOLOGY | Age: 60
Discharge: HOME | End: 2025-08-28
Attending: INTERNAL MEDICINE

## 2025-08-28 DIAGNOSIS — M54.42 ACUTE LOW BACK PAIN WITH BILATERAL SCIATICA, UNSPECIFIED BACK PAIN LATERALITY: ICD-10-CM

## 2025-08-28 DIAGNOSIS — M54.41 ACUTE LOW BACK PAIN WITH BILATERAL SCIATICA, UNSPECIFIED BACK PAIN LATERALITY: ICD-10-CM

## 2025-08-28 PROCEDURE — 72100 X-RAY EXAM L-S SPINE 2/3 VWS: CPT | Performed by: INTERNAL MEDICINE

## (undated) DIAGNOSIS — G47.33 OSA (OBSTRUCTIVE SLEEP APNEA): Primary | ICD-10-CM

## (undated) DIAGNOSIS — E78.00 HYPERCHOLESTEROLEMIA: ICD-10-CM

## (undated) DEVICE — MEDI-VAC NON-CONDUCTIVE SUCTION TUBING 6MM X 1.8M (6FT.) L: Brand: CARDINAL HEALTH

## (undated) DEVICE — RECTAL CDS-LF: Brand: MEDLINE INDUSTRIES, INC.

## (undated) DEVICE — SUT PROLENE 6-0 C-1 8726H

## (undated) DEVICE — MINOR GENERAL: Brand: MEDLINE INDUSTRIES, INC.

## (undated) DEVICE — REM POLYHESIVE ADULT PATIENT RETURN ELECTRODE: Brand: VALLEYLAB

## (undated) DEVICE — ANTISEPTIC 4OZ 3% H2O2 1ST AID ORAL DEBRIDING

## (undated) DEVICE — GLOVE SUR 7.5 SENSICARE PI PIP GRN PWD F

## (undated) DEVICE — 12 ML SYRINGE LUER-LOCK TIP: Brand: MONOJECT

## (undated) DEVICE — SOLUTION IRRIG 1000ML 0.9% NACL USP BTL

## (undated) DEVICE — UNDERPANTS MAT 2XL FOR 24-64IN WAIST PUR

## (undated) DEVICE — PAD ABD W7.5XL8IN GEN USE NONADHESIVE EXTRA

## (undated) DEVICE — STERILE SURGICAL LUBRICANT, METAL TUBE: Brand: SURGILUBE

## (undated) DEVICE — SUT PROLENE 6-0 C-1 8718H

## (undated) DEVICE — CARTRIDGE HC HMS+ CRTDG SYR

## (undated) DEVICE — [HIGH FLOW INSUFFLATOR,  DO NOT USE IF PACKAGE IS DAMAGED,  KEEP DRY,  KEEP AWAY FROM SUNLIGHT,  PROTECT FROM HEAT AND RADIOACTIVE SOURCES.]: Brand: PNEUMOSURE

## (undated) DEVICE — SNARE OPTMZ PLPCTM TRP

## (undated) DEVICE — SPONGE GZ W4XL8IN COT 12 PLY WVN

## (undated) DEVICE — UNDYED BRAIDED (POLYGLACTIN 910), SYNTHETIC ABSORBABLE SUTURE: Brand: COATED VICRYL

## (undated) DEVICE — 32 FR RIGHT ANGLE – SOFT PVC CATHETER: Brand: PVC THORACIC CATHETERS

## (undated) DEVICE — SLEEVE COMPR MD KNEE LEN SGL USE KENDALL SCD

## (undated) DEVICE — 3 ML SYRINGE LUER-LOCK TIP: Brand: MONOJECT

## (undated) DEVICE — ADAPTER CARDIOPLEGIA DLP L26.5

## (undated) DEVICE — Device: Brand: CUSTOM PROCEDURE KIT

## (undated) DEVICE — ANTIBACTERIAL UNDYED BRAIDED (POLYGLACTIN 910), SYNTHETIC ABSORBABLE SUTURE: Brand: COATED VICRYL

## (undated) DEVICE — SUT SILK 1-0 SA87G

## (undated) DEVICE — LINE MNTR ADLT SET O2 INTMD

## (undated) DEVICE — GLOVE SUR 7 SENSICARE PI PIP CRM PWD F

## (undated) DEVICE — SPONGE GZ 4X8IN COT 12 PLY WVN

## (undated) DEVICE — SUTURE WIRE DOUBLE STERNOTOMY

## (undated) DEVICE — SUT PROLENE 3-0 SH-1 8762H

## (undated) DEVICE — SKIN PREP TRAY 4 COMPARTM TRAY: Brand: MEDLINE INDUSTRIES, INC.

## (undated) DEVICE — VESSEL LOOP MAXI BLUE

## (undated) DEVICE — HARMONIC FOCUS SHEARS 9CM LENGTH + ADAPTIVE TISSUE TECHNOLOGY FOR USE WITH BLUE HAND PIECE ONLY: Brand: HARMONIC FOCUS

## (undated) DEVICE — VIOLET BRAIDED (POLYGLACTIN 910), SYNTHETIC ABSORBABLE SUTURE: Brand: COATED VICRYL

## (undated) DEVICE — SUT STEEL MONO 7 CCS M655G

## (undated) DEVICE — OPTISITE ARTERIAL PERFUSION CANNULA: Brand: OPTISITE ARTERIAL PERFUSION CANNULA

## (undated) DEVICE — 12 FOOT DISPOSABLE EXTENSION CABLE WITH SAFE CONNECT / SCREW-DOWN

## (undated) DEVICE — Device: Brand: DEFENDO AIR/WATER/SUCTION AND BIOPSY VALVE

## (undated) DEVICE — ABSORBABLE HEMOSTAT (OXIDIZED REGENERATED CELLULOSE, U.S.P.): Brand: SURGICEL

## (undated) DEVICE — PUNCH PERFECTCUT 4X8

## (undated) DEVICE — SUT SILK 4-0 SA63H

## (undated) DEVICE — PAD PLMM SLVR 12.5X4IN SLVR

## (undated) DEVICE — SPONGE: SPECIALTY PEANUT XR 100/CS: Brand: MEDICAL ACTION INDUSTRIES

## (undated) DEVICE — SUTURE COAT VCRL 2-0 27IN ABSRB VLT 26MM SH

## (undated) DEVICE — LEAD PACING STREAMLINE BIPOLAR

## (undated) DEVICE — CATH HYDRAGLIDE XL 32F

## (undated) DEVICE — SUT PROLENE 7-0 BV-1 8701H

## (undated) DEVICE — GLOVE SURGICAL 7.5 SENSICARE P

## (undated) DEVICE — CS5/5+ FASTPACK, 225ML 150U RES: Brand: HAEMONETICS CELL SAVER 5/5+ SYSTEMS

## (undated) DEVICE — SUT COAT VCRL 2-0 27IN ABSRB VLT 36MM CT-1

## (undated) DEVICE — SUT VICRYL 1-0 CTX J977H

## (undated) DEVICE — PAD ALC 43.5X24IN PREP  LF

## (undated) DEVICE — PACK PERFUSION PUMP

## (undated) DEVICE — SOL NACL IRRIG 0.9% 1000ML BTL

## (undated) DEVICE — SNARE CAPTIFLEX MICRO-OVL OLY

## (undated) DEVICE — DEVICE BLWR/MSTR ACCUMIST ATCH

## (undated) DEVICE — ABDOMINAL PAD: Brand: DERMACEA

## (undated) DEVICE — PROVE COVER: Brand: UNBRANDED

## (undated) DEVICE — DRAPE SLUSH WARMER 44X66

## (undated) DEVICE — INTENDED TO BE USED TO OCCLUDE, RETRACT AND IDENTIFY ARTERIES, VEINS, TENDONS AND NERVES IN SURGICAL PROCEDURES: Brand: STERION®  VESSEL LOOP

## (undated) DEVICE — UNDERPANTS INCONT 2XL KNIT MAT

## (undated) DEVICE — 6 ML SYRINGE LUER-LOCK TIP: Brand: MONOJECT

## (undated) DEVICE — HEART DRAPE AND SUPPLY: Brand: MEDLINE INDUSTRIES, INC.

## (undated) DEVICE — Device: Brand: DUAL NARE NASAL CANNULAE FEMALE LUER CON 7FT O2 TUBE

## (undated) DEVICE — GAUZE,SPONGE,4"X4",12PLY,STERILE,LF,2'S: Brand: MEDLINE

## (undated) DEVICE — SUT PDS II 2-0 CT-1 Z339H

## (undated) DEVICE — 35 ML SYRINGE REGULAR TIP: Brand: MONOJECT

## (undated) DEVICE — KIT CLEAN ENDOKIT 1.1OZ GOWNX2

## (undated) DEVICE — STANDARD HYPODERMIC NEEDLE,POLYPROPYLENE HUB: Brand: MONOJECT

## (undated) DEVICE — RING RETRCT W14.1XL14.1CM PLAS SELF RET ADJ

## (undated) DEVICE — SYRINGE MED 10ML LL TIP W/O SFTY DISP

## (undated) DEVICE — Device

## (undated) DEVICE — PACK CDS RECTAL

## (undated) DEVICE — SUT PROLENE 4-0 RB-1 8557H

## (undated) DEVICE — SUT VICRYL 2-0 CT-1 J945H

## (undated) DEVICE — E-Z CLEAN, NON-STICK, PTFE COATED, ELECTROSURGICAL BLADE ELECTRODE, 2.75 INCH (7 CM): Brand: MEGADYNE

## (undated) DEVICE — DRAIN ATRIUM OASIS CHEST DRY

## (undated) DEVICE — STERILE TETRA-FLEX CF, ELASTIC BANDAGE, 4" X 5.5YD: Brand: TETRA-FLEX™CF

## (undated) DEVICE — SUT PDS II 2-0 27IN ABSRB VLT L26MM CT-2

## (undated) DEVICE — SUT ETHIBOND 0 CT-1 X424H

## (undated) DEVICE — GAUZE SPONGES,USP TYPE VII GAUZE, 12 PLY: Brand: CURITY

## (undated) DEVICE — SUT PERMA- 0 18IN NABSRB BLK TIE SILK

## (undated) DEVICE — CATH HYDRAGLIDE XL 32F RIGHT

## (undated) DEVICE — GAMMEX® PI HYBRID SIZE 8, STERILE POWDER-FREE SURGICAL GLOVE, POLYISOPRENE AND NEOPRENE BLEND: Brand: GAMMEX

## (undated) DEVICE — PAD,ABDOMINAL,8"X7.5",STERILE,LF,1/PK: Brand: MEDLINE

## (undated) DEVICE — SPONGE GZ W4XL4IN 100% COT 12 PLY TYP VII WVN

## (undated) DEVICE — SOL  0.9% 1000ML

## (undated) DEVICE — JELLY,LUBE,STERILE,FLIP TOP,TUBE,2-OZ: Brand: MEDLINE

## (undated) DEVICE — MEGADYNE EZ CLEAN BLADE 2.75IN

## (undated) DEVICE — 3M™ BAIR HUGGER® UNDERBODY BLANKET, FULL ACCESS, 10 PER CASE 63500: Brand: BAIR HUGGER™

## (undated) DEVICE — FORESIGHT LARGE SENSOR: Brand: FORESIGHT

## (undated) DEVICE — MATERNITY KNIT PANTS,SEAMLESS: Brand: WINGS

## (undated) DEVICE — AGENT HEMO THROMBIN 8ML KIT

## (undated) DEVICE — PACK ASSRY CUSTOM TUBING

## (undated) DEVICE — KIT ENDO ORCAPOD 160/180/190

## (undated) DEVICE — Device: Brand: VIRTUOSAPH PLUS WITH RADIAL INDICATION

## (undated) DEVICE — 60 ML SYRINGE REGULAR TIP: Brand: MONOJECT

## (undated) DEVICE — SUT PROLENE 5-0 C-1 8325H

## (undated) DEVICE — SPONGE LAPAROTOMY 18X18IN 7IN

## (undated) DEVICE — SOLUTION PREP 4OZ 3% H2O2 1ST AID ANTISEP

## (undated) DEVICE — STRAP OR POS W3.5XL19IN FOAM STRRP W/ SLIP

## (undated) DEVICE — DRIVER DRILL ZDRIVE

## (undated) DEVICE — TRAY CATH BDX 16FR 350ML FL

## (undated) DEVICE — SUTURE CHROMIC GUT SZ 2-0 L27IN ABSRB UD

## (undated) DEVICE — HOOK RETRCT 5MM SHRP E STAY DISP LONE STAR

## (undated) DEVICE — PAD,ABDOMINAL,8"X7.5",ST,LF,20/BX: Brand: MEDLINE INDUSTRIES, INC.

## (undated) DEVICE — SUCTION CANISTER, 3000CC,SAFELINER: Brand: DEROYAL

## (undated) DEVICE — PENCIL TELESCOPE MEGADYNE SE

## (undated) DEVICE — PERCUTANEOUS INSERTION KIT-ARTERIAL: Brand: PERCUTANEOUS INSERTION KIT-ARTERIAL

## (undated) DEVICE — GAMMEX® PI HYBRID SIZE 7, STERILE POWDER-FREE SURGICAL GLOVE, POLYISOPRENE AND NEOPRENE BLEND: Brand: GAMMEX

## (undated) DEVICE — SUT SILK 2-0 SA85H

## (undated) DEVICE — SUT SILK 2-0 SH C012D

## (undated) DEVICE — SUTURE PERMAHAND SZ 0 L18IN NONABSORBABLE BLK

## (undated) DEVICE — SLEEVE COMPR M KNEE LEN SGL USE KENDALL SCD

## (undated) DEVICE — SINGLE-USE POLYPECTOMY SNARE: Brand: CAPTIFLEX

## (undated) DEVICE — STERILE TETRA-FLEX CF, ELASTIC BANDAGE LATEX FREE 6IN X5.5 YD: Brand: TETRA-FLEX™CF

## (undated) DEVICE — CONNECTOR PRFSN QCK PRM .25IN

## (undated) DEVICE — ELECTRODE ES L7CM NDL MPLR OPN APPRCH EZ

## (undated) DEVICE — HEART A: Brand: MEDLINE INDUSTRIES, INC.

## (undated) DEVICE — CANNULA PRFSN 15IN 32/40FR .5

## (undated) DEVICE — SUT MONOCRYL 3-0 PS-1 Y936H

## (undated) DEVICE — SUT SILK 2-0 SH K833H

## (undated) NOTE — LETTER
6/29/2023    [address] [facility] [address]            Dear Cameron Wynn,      Our records indicate that you are due for an appointment for a Colonoscopy with Patrizia Stroud MD. Our doctors are booking out about 3-5 months in advance for procedures. Please call our office to schedule a phone screening appointment to plan for the procedure(s). Your medical well-being is important to us. If your insurance requires a referral, please call your primary care office to request one.       Thank you,      The Physicians and Staff at [facility]

## (undated) NOTE — ED AVS SNAPSHOT
Servando Hanna   MRN: [id number]    Department:  [facility] Emergency Department   Date of Visit:  3/17/2019           Disclosure     Insurance plans vary and the physician(s) referred by the ER may not be covered by your plan.  Please contact y CARE PHYSICIAN AT ONCE OR RETURN IMMEDIATELY TO THE EMERGENCY DEPARTMENT. If you have been prescribed any medication(s), please fill your prescription right away and begin taking the medication(s) as directed.   If you believe that any of the medications

## (undated) NOTE — MR AVS SNAPSHOT
[facility] SPECIALTY [facility] [place] [address] [address]  [id number] [phone]               Thank you for choosing us for your health care visit with Demarcus Krishna MD.  We are glad to serve you and happy to provide you with this summary o authorization, such as [place], please feel free to schedule your appointment immediately. However, if you are unsure about the requirements for authorization, please wait 5-7 days and then contact your physician's office.  At that time, you will These medications [id number] [place] - [address] AT [address] [place] [address], [phone], [address] [place], [place] [address]jitendra Olvera, [facility] [address]    Hours:  24-hours Phone:  [phone]    - naproxen 500 MG Tabs            [facility] Make half your plate fruits and vegetables Highly refined, white starches including white bread, rice and pasta   Eat plenty of protein, keep the fat content low Sugars:  sodas and sports drinks, candies and desserts   Eat plenty of low-fat dairy products

## (undated) NOTE — LETTER
[facility]  Administration of Anesthesia  IArnulfo agree to be cared for by a physician anesthesiologist alone and/or with a nurse anesthetist, who is specially trained to monitor me and give me medicine to put me to sleep or keep me comfortable during my procedure    I understand that my anesthesiologist and/or anesthetist is not an employee or agent of [facility] or [employer]. He or she works for [facility]    As the patient asking for anesthesia services, I agree to:  Allow the anesthesiologist (anesthesia doctor) to give me medicine and do additional procedures as necessary. Some examples are: Starting or using an “IV” to give me medicine, fluids or blood during my procedure, and having a breathing tube placed to help me breathe when I’m asleep (intubation). In the event that my heart stops working properly, I understand that my anesthesiologist will make every effort to sustain my life, unless otherwise directed by [facility] Do Not Resuscitate documents.  Tell my anesthesia doctor before my procedure:  If I am pregnant.  The last time that I ate or drank.  iii. All of the medicines I take (including prescriptions, herbal supplements, and pills I can buy without a prescription (including street drugs/illegal medications). Failure to inform my anesthesiologist about these medicines may increase my risk of anesthetic complications.  iv.If I am allergic to anything or have had a reaction to anesthesia before.  I understand how the anesthesia medicine will help me (benefits).  I understand that with any type of anesthesia medicine there are risks:  The most common risks are: nausea, vomiting, sore throat, muscle soreness, damage to my eyes, mouth, or teeth (from breathing tube placement).  Rare risks include: remembering what happened during my procedure, allergic reactions to medications, injury to my airway, heart, lungs, vision, nerves, or  muscles and in extremely rare instances death.  My doctor has explained to me other choices available to me for my care (alternatives).  Pregnant Patients (“epidural”):  I understand that the risks of having an epidural (medicine given into my back to help control pain during labor), include itching, low blood pressure, difficulty urinating, headache or slowing of the baby’s heart. Very rare risks include infection, bleeding, seizure, irregular heart rhythms and nerve injury.  Regional Anesthesia (“spinal”, “epidural”, & “nerve blocks”):  I understand that rare but potential complications include headache, bleeding, infection, seizure, irregular heart rhythms, and nerve injury.    _____________________________________________________________________________  Patient (or Representative) Signature/Relationship to Patient  Date   Time    _____________________________________________________________________________   Name (if used)    Language/Organization   Time    _____________________________________________________________________________  Nurse Anesthetist Signature     Date   Time  _____________________________________________________________________________  Anesthesiologist Signature     Date   Time  I have discussed the procedure and information above with the patient (or patient’s representative) and answered their questions. The patient or their representative has agreed to have anesthesia services.    _____________________________________________________________________________  Witness        Date   Time  I have verified that the signature is that of the patient or patient’s representative, and that it was signed before the procedure  Patient Name: Arnulfo Clark     : 5/10/1965                 Printed: 2024 at 3:17 PM    Medical Record #: [id number]                                            Page 1 of 1  ----------ANESTHESIA CONSENT----------

## (undated) NOTE — LETTER
8/30/2022              Nona Clark        [phone] [phone]         To whom it may concern,    The above patient underwent drainage of a anal rectal abscess at [facility].  He was hospitalized received IV antibiotics. He was discharged home and was doing packing changes. [place] and while in [place] and [facility] developed a recurrence of his abscess requiring further surgery.       Sincerely,      Marshal Ruiz MD  [facility] [address]  [phone]        Document electronically generated by:  Marshal Ruiz MD

## (undated) NOTE — Clinical Note
Spoke with pt today--offered sooner HFU--pt declines, prefers to keep appts, as scheduled--sent TE to office staff as FYI/TCM protocol--thank you. Follow up appointments:     Follow up With Specialties Details Why Contact Info DAMIEN Pappas Nurse Practitioner, CARDIOLOGY Follow up on 9/14/2023 [facility] follow [email or web address] [phone] Alan Brice [place] Physician Assistant Follow up on 9/20/2023 hospital follow [email or web address] Araceli Lopez [facility] Jordan Arriola MD Interventional, Cardiology, CARDIOLOGY Follow up on 10/4/2023 hospital follow up @9:00am [address] Alejandra Key MD Internal Medicine Follow up on 9/26/2023 hospital follow [email or web address] [facility]. [address] [phone]

## (undated) NOTE — ED AVS SNAPSHOT
Jenna Dey   MRN: [id number]    Department:  [facility] Emergency Department   Date of Visit:  5/4/2018           Disclosure     Insurance plans vary and the physician(s) referred by the ER may not be covered by your plan.  Please contact liss CARE PHYSICIAN AT ONCE OR RETURN IMMEDIATELY TO THE EMERGENCY DEPARTMENT. If you have been prescribed any medication(s), please fill your prescription right away and begin taking the medication(s) as directed.   If you believe that any of the medications

## (undated) NOTE — LETTER
Patient Name: Qian Delong  : 5/10/1965  MRN: [id number]  Patient Address: [address] [place] [address]      Coronavirus Disease 2019 (COVID-19)     [address] is committed to the safety and well-being of our patients, members, employ 2. Monitor your symptoms carefully. If your symptoms get worse, call your healthcare provider immediately. 3. Get rest and stay hydrated.    4. If you have a medical appointment, call the healthcare provider ahead of time and tell them that you have or may ? At least 24 hours have passed since recovery defined as resolution of fever without the use of fever-reducing medications; and  · Improvement in respiratory symptoms (e.g., cough, shortness of breath); and  · At least 10 days have passed since symptoms f If you would be interested in donating your plasma to help treat others diagnosed with the virus, please contact [facility] directly on their website: [email or web address]    Who is eligible to donate convalescent plasma?

## (undated) NOTE — LETTER
Date & Time: 7/19/2021, 12:16 AM  Patient: Markel Astorga  Encounter Provider(s):    Peter Ngo Attending  DAMIEN Velez       To Whom It May Concern:    Carmen Lin was seen and treated in our department on 7/18/2021.  He  Not return to work

## (undated) NOTE — LETTER
AUTHORIZATION FOR SURGICAL OPERATION OR OTHER PROCEDURE    1. I hereby authorize Dr. Dusty Clemens , and [address] [place] [address] staff assigned to my case to perform the following operation and/or procedure at the [address] [place] [address]:    _______________________________________________________________________________________________    Cortisone injection Left middle finger  _______________________________________________________________________________________________    2. My physician has explained the nature and purpose of the operation or other procedure, possible alternative methods of treatment, the risks involved, and the possibility of complication to me. I acknowledge that no guarantee has been made as to the result that may be obtained. 3.  I recognize that, during the course of this operation, or other procedure, unforseen conditions may necessitate additional or different procedure than those listed above. I, therefore, further authorize and request that the above named physician, his/her physician assistants or designees perform such procedures as are, in his/her professional opinion, necessary and desirable. 4.  Any tissue or organs removed in the operation or other procedure may be disposed of by and at the discretion of the [address] [place] [address] and [facility]. 5.  I understand that in the event of a medical emergency, I will be transported by local paramedics to [facility] or other hospital emergency department. 6.  I certify that I have read and fully understand the above consent to operation and/or other procedure. 7.  I acknowledge that my physician has explained sedation/analgesia administration to me including the risks and benefits. I consent to the administration of sedation/analgesia as may be necessary or desirable in the judgement of my physician.     Witness signature: ___________________________________________________ Date:  ______/______/_____ Time:  ________ A. M.  P.M. Patient Name:  ______________________________________________________  (please print)      Patient signature:  ___________________________________________________             Relationship to Patient:           []  Parent    Responsible person                          []  Spouse  In case of minor or                    [] Other  _____________   Incompetent name:  __________________________________________________                               (please print)      _____________      Responsible person  In case of minor or  Incompetent signature:  _______________________________________________    Statement of Physician  My signature below affirms that prior to the time of the procedure, I have explained to the patient and/or his/her guardian, the risks and benefits involved in the proposed treatment and any reasonable alternative to the proposed treatment. I have also explained the risks and benefits involved in the refusal of the proposed treatment and have answered the patient's questions.                         Date:  ______/______/_______  Provider                      Signature:  __________________________________________________________       Time:  ___________ A.M    P.M.

## (undated) NOTE — LETTER
Coronavirus Disease 2019 (COVID-19)     [place] [address] is committed to the safety and well-being of our patients, members, employees, and communities.  As concerns arise about the new strain of coronavirus that causes COVID-19, [place] [address] 4. If you have a medical appointment, call the healthcare provider ahead of time and tell them that you have or may have COVID-19.  5. For medical emergencies, call 911 and notify the dispatch personnel that you have or may have COVID-19.   6. Cover your c · At least 10 days have passed since symptoms first appeared OR if asymptomatic patient or date of symptom onset is unclear then use 10 days post COVID test date.    · At least 20 days have passed for severe illness (requiring hospitalization) OR if you are *Some people will be required to have a repeat COVID-19 test in order to be eligible to donate. If you’re instructed by Garrett Akbar that a repeat test is required, please contact the [facility] COVID-19 Nurse Triage Line at [phone].     Additional Inf

## (undated) NOTE — Clinical Note
2/18/2017              [address] [place] [address]         Dear Mary Ann Vela,    This letter is to inform you that our office has made several attempts to reach you by phone without success.   We were attempting to contact you by

## (undated) NOTE — LETTER
Jeff Hogan [address], [facility]  [address]  INFORMED CONSENT FOR TRANSFUSION OF BLOOD OR BLOOD PRODUCTS  My physician has informed me of the nature, purpose, benefits and risks of transfusion for blood and blood components that he/she may deem necessary during my treatment or hospitalization. He/she has also discussed alternatives to receiving blood from the voluntary blood supply with me, such as self-donation (autologous) and directed donation (blood donated by family or friends to be used specifically for me). I further understand that while the [address] [place] [address] will attempt to supply any autologous or directed donor blood prior to transfusion of blood from the routine blood supply, medical circumstances may require that other or additional blood components may be required for my care. In giving consent, I acknowledge that my physician has also informed me that despite careful screening and testing in accordance with national and regional regulations, there is still a small risk of transmission of infectious agents including hepatitis, HIV-1/2, cytomegalovirus and other viruses or diseases as yet unknown for which licensed definitive screening tests do not currently exist. Additionally, my physician has informed me of the potential for transfusion reactions not related to an infectious agent. [  ]  Check here for Recurring Outpatient Transfusion Therapy (valid for 1 year) In addition to the above, my physician has informed me that I shall receive numerous transfusions over a period of time and that these can lead to other increased risks. I hereby authorize the transfusion of blood and/or blood products to me as deemed necessary and ordered by physicians participating in my care.  My physician has given me the opportunity to ask questions and any questions asked have been answered to my satisfaction  __________________________________________ ______________________________________________  (Signature of Patient)                                                            (Responsible party in case of Minor,                                                                                                 Incompetent, or unconscious Patient)  ___________________________________________       ________ ______________________________________  (Relationship to Patient)                                                       (Signature of Witness)  ______________________________________________________________________________________________   (Date)                                                                           (Time)  REFUSAL OF CONSENT FOR BLOOD TRANSFUSIONS   Sign only if Refusing   [  ] I understand refusal of blood or blood products as deemed necessary by my physician may have serious consequences to my condition to include possible death.  I hereby assume responsibility for my refusal and release the hospital, its personnel, and my physicians from any responsibility for the consequences of my refusal.    ________________________________________________________________________________  (Signature of Patient)                                                         (Responsible Party/Relationship to Patient)    ________________________________________________________________________________  (Signature of Witness)                                                       (Date/Time)     Patient Name: Claudio De Santiago     : 5/10/1965                 Printed: 2023      Medical Record #: [id number]                                 Page 1 of 1

## (undated) NOTE — LETTER
To:  DR VALENZUELA  Date:  2024  Patient Name: Arnulfo Clark DOB-Age / Sex: 5/10/1965-A: 58 y  male  Medical Records: [id number]   [facility]: [id number]      Clearance for Surgery Requested by Surgeon    Request for:  Cardiac Clearance    Requested by Surgeon: DR FAN    Surgical Date: 2024    Procedure: ANAL EXAM UNDER ANESTHESIA, POSSIBLE FISTULOTOMY, POSSIBLE SETON PLACEMENT, POSSIBLE DRAINAGE OF PERIANAL ABSCESS, N/A      Please fax the clearance note to the Pre-Admission Testing department.  Thank you.

## (undated) NOTE — LETTER
Jeff Hogan [address], [facility] [address], [place]  [address]  INFORMED CONSENT FOR TRANSFUSION OF BLOOD OR BLOOD PRODUCTS  My physician has informed me of the nature, purpose, benefits and risks of transfusion for blood and blood components that he/she may deem necessary during my treatment or hospitalization. He/she has also discussed alternatives to receiving blood from the voluntary blood supply with me, such as self-donation (autologous) and directed donation (blood donated by family or friends to be used specifically for me). I further understand that while the [address] [place] [address] will attempt to supply any autologous or directed donor blood prior to transfusion of blood from the routine blood supply, medical circumstances may require that other or additional blood components may be required for my care. In giving consent, I acknowledge that my physician has also informed me that despite careful screening and testing in accordance with national and regional regulations, there is still a small risk of transmission of infectious agents including hepatitis, HIV-1/2, cytomegalovirus and other viruses or diseases as yet unknown for which licensed definitive screening tests do not currently exist. Additionally, my physician has informed me of the potential for transfusion reactions not related to an infectious agent. [  ]  Check here for Recurring Outpatient Transfusion Therapy (valid for 1 year) In addition to the above, my physician has informed me that I shall receive numerous transfusions over a period of time and that these can lead to other increased risks. I hereby authorize the transfusion of blood and/or blood products to me as deemed necessary and ordered by physicians participating in my care.  My physician has given me the opportunity to ask questions and any questions asked have been answered to my satisfaction  __________________________________________ ______________________________________________  (Signature of Patient)                                                            (Responsible party in case of Minor,                                                                                                 Incompetent, or unconscious Patient)  ___________________________________________       ________ ______________________________________  (Relationship to Patient)                                                       (Signature of Witness)  ______________________________________________________________________________________________   (Date)                                                                           (Time)  REFUSAL OF CONSENT FOR BLOOD TRANSFUSIONS   Sign only if Refusing   [  ] I understand refusal of blood or blood products as deemed necessary by my physician may have serious consequences to my condition to include possible death.  I hereby assume responsibility for my refusal and release the hospital, its personnel, and my physicians from any responsibility for the consequences of my refusal.    ________________________________________________________________________________  (Signature of Patient)                                                         (Responsible Party/Relationship to Patient)    ________________________________________________________________________________  (Signature of Witness)                                                       (Date/Time)     Patient Name: Mariam Hernandez     : 5/10/1965                 Printed: 2023      Medical Record #: [id number]                                 Page 1 of 1

## (undated) NOTE — Clinical Note
Spoke with patient for HFU today--he will schedule f/u with Dr. Verito Ward, but declines HFU appt with PCP, as he is leaving for [place] in 4-5 days--thank you.

## (undated) NOTE — LETTER
[address]  Authorization for Surgical Operation and Procedure                                                                                           I hereby authorize Jewell Danielle MD, my physician and his/her assistants (if applicable), which may include medical students, residents, and/or fellows, to perform the following surgical operation/ procedure and administer such anesthesia as may be determined necessary by my physician: Operation/Procedure name (s) CORONARY ARTERY BYPASS GRAFT; POSSIBLE ENDOSCOPIC VEIN  HARVEST on [address] [facility]   2. I recognize that during the surgical operation/procedure, unforeseen conditions may necessitate additional or different procedures than those listed above. I, therefore, further authorize and request that the above-named surgeon, assistants, or designees perform such procedures as are, in their judgment, necessary and desirable. 3.   My surgeon/physician has discussed prior to my surgery the potential benefits, risks and side effects of this procedure; the likelihood of achieving goals; and potential problems that might occur during recuperation. They also discussed reasonable alternatives to the procedure, including risks, benefits, and side effects related to the alternatives and risks related to not receiving this procedure. I have had all my questions answered and I acknowledge that no guarantee has been made as to the result that may be obtained. 4.   Should the need arise during my operation/procedure, which includes change of level of care prior to discharge, I also consent to the administration of blood and/or blood products. Further, I understand that despite careful testing and screening of blood or blood products by collecting agencies, I may still be subject to ill effects as a result of receiving a blood transfusion and/or blood products.   The following are some, but not all, of the potential risks that can occur: fever and allergic reactions, hemolytic reactions, transmission of diseases such as Hepatitis, AIDS and Cytomegalovirus (CMV) and fluid overload. In the event that I wish to have an autologous transfusion of my own blood, or a directed donor transfusion, I will discuss this with my physician. Check only if Refusing Blood or Blood Products  I understand refusal of blood or blood products as deemed necessary by my physician may have serious consequences to my condition to include possible death. I hereby assume responsibility for my refusal and release the hospital, its personnel, and my physicians from any responsibility for the consequences of my refusal.    o  Refuse   5. I authorize the use of any specimen, organs, tissues, body parts or foreign objects that may be removed from my body during the operation/procedure for diagnosis, research or teaching purposes and their subsequent disposal by hospital authorities. I also authorize the release of specimen test results and/or written reports to my treating physician on the hospital medical staff or other referring or consulting physicians involved in my care, at the discretion of the Pathologist or my treating physician. 6.   I consent to the photographing or videotaping of the operations or procedures to be performed, including appropriate portions of my body for medical, scientific, or educational purposes, provided my identity is not revealed by the pictures or by descriptive texts accompanying them. If the procedure has been photographed/videotaped, the surgeon will obtain the original picture, image, videotape or CD. The hospital will not be responsible for storage, release or maintenance of the picture, image, tape or CD.    7.   I consent to the presence of a  or observers in the operating room as deemed necessary by my physician or their designees.     8.   I recognize that in the event my procedure results in extended X-Ray/fluoroscopy time, I may develop a skin reaction. 9. If I have a Do Not Attempt Resuscitation (DNAR) order in place, that status will be suspended while in the operating room, procedural suite, and during the recovery period unless otherwise explicitly stated by me (or a person authorized to consent on my behalf). The surgeon or my attending physician will determine when the applicable recovery period ends for purposes of reinstating the DNAR order. 10. Patients having a sterilization procedure: I understand that if the procedure is successful the results will be permanent and it will therefore be impossible for me to inseminate, conceive, or bear children. I also understand that the procedure is intended to result in sterility, although the result has not been guaranteed. 11. I acknowledge that my physician has explained sedation/analgesia administration to me including the risk and benefits I consent to the administration of sedation/analgesia as may be necessary or desirable in the judgment of my physician. I CERTIFY THAT I HAVE READ AND FULLY UNDERSTAND THE ABOVE CONSENT TO OPERATION and/or OTHER PROCEDURE.     _________________________________________ _________________________________     ___________________________________  Signature of Patient     Signature of Responsible Person                   Printed Name of Responsible Person                              _________________________________________ ______________________________        ___________________________________  Signature of Witness         Date  Time         Relationship to Patient    STATEMENT OF PHYSICIAN My signature below affirms that prior to the time of the procedure; I have explained to the patient and/or his/her legal representative, the risks and benefits involved in the proposed treatment and any reasonable alternative to the proposed treatment.  I have also explained the risks and benefits involved in refusal of the proposed treatment and alternatives to the proposed treatment and have answered the patient's questions.  If I have a significant financial interest in a co-management agreement or a significant financial interest in any product or implant, or other significant relationship used in this procedure/surgery, I have disclosed this and had a discussion with my patient.     _______________________________________________________________ _____________________________  Tere Powell of Physician)                                                                                         (Date)                                   (Time)  Patient Name: Carl Merlos    : 5/10/1965   Printed: 2023      Medical Record #: [id number]                                              Page 1 of 1

## (undated) NOTE — LETTER
[place] [address], [place], [place]  Authorization for Surgical Operation and Procedure                                                                                           I hereby authorize Bhavesh Beltran MD, my physician and his/her assistants (if applicable), which may include medical students, residents, and/or fellows, to perform the following surgical operation/ procedure and administer such anesthesia as may be determined necessary by my physician: Operation/Procedure name (s) COLONOSCOPY on Arnulfo Clark   2.   I recognize that during the surgical operation/procedure, unforeseen conditions may necessitate additional or different procedures than those listed above.  I, therefore, further authorize and request that the above-named surgeon, assistants, or designees perform such procedures as are, in their judgment, necessary and desirable.    3.   My surgeon/physician has discussed prior to my surgery the potential benefits, risks and side effects of this procedure; the likelihood of achieving goals; and potential problems that might occur during recuperation.  They also discussed reasonable alternatives to the procedure, including risks, benefits, and side effects related to the alternatives and risks related to not receiving this procedure.  I have had all my questions answered and I acknowledge that no guarantee has been made as to the result that may be obtained.    4.   Should the need arise during my operation/procedure, which includes change of level of care prior to discharge, I also consent to the administration of blood and/or blood products.  Further, I understand that despite careful testing and screening of blood or blood products by collecting agencies, I may still be subject to ill effects as a result of receiving a blood transfusion and/or blood products.  The following are some, but not all, of the potential risks that can occur: fever and allergic  reactions, hemolytic reactions, transmission of diseases such as Hepatitis, AIDS and Cytomegalovirus (CMV) and fluid overload.  In the event that I wish to have an autologous transfusion of my own blood, or a directed donor transfusion, I will discuss this with my physician.  Check only if Refusing Blood or Blood Products  I understand refusal of blood or blood products as deemed necessary by my physician may have serious consequences to my condition to include possible death. I hereby assume responsibility for my refusal and release the hospital, its personnel, and my physicians from any responsibility for the consequences of my refusal.    o  Refuse   5.   I authorize the use of any specimen, organs, tissues, body parts or foreign objects that may be removed from my body during the operation/procedure for diagnosis, research or teaching purposes and their subsequent disposal by hospital authorities.  I also authorize the release of specimen test results and/or written reports to my treating physician on the hospital medical staff or other referring or consulting physicians involved in my care, at the discretion of the Pathologist or my treating physician.    6.   I consent to the photographing or videotaping of the operations or procedures to be performed, including appropriate portions of my body for medical, scientific, or educational purposes, provided my identity is not revealed by the pictures or by descriptive texts accompanying them.  If the procedure has been photographed/videotaped, the surgeon will obtain the original picture, image, videotape or CD.  The hospital will not be responsible for storage, release or maintenance of the picture, image, tape or CD.    7.   I consent to the presence of a  or observers in the operating room as deemed necessary by my physician or their designees.    8.   I recognize that in the event my procedure results in extended X-Ray/fluoroscopy time, I may  develop a skin reaction.    9. If I have a Do Not Attempt Resuscitation (DNAR) order in place, that status will be suspended while in the operating room, procedural suite, and during the recovery period unless otherwise explicitly stated by me (or a person authorized to consent on my behalf). The surgeon or my attending physician will determine when the applicable recovery period ends for purposes of reinstating the DNAR order.  10. Patients having a sterilization procedure: I understand that if the procedure is successful the results will be permanent and it will therefore be impossible for me to inseminate, conceive, or bear children.  I also understand that the procedure is intended to result in sterility, although the result has not been guaranteed.   11. I acknowledge that my physician has explained sedation/analgesia administration to me including the risk and benefits I consent to the administration of sedation/analgesia as may be necessary or desirable in the judgment of my physician.    I CERTIFY THAT I HAVE READ AND FULLY UNDERSTAND THE ABOVE CONSENT TO OPERATION and/or OTHER PROCEDURE.     _________________________________________ _________________________________     ___________________________________  Signature of Patient     Signature of Responsible Person                   Printed Name of Responsible Person                              _________________________________________ ______________________________        ___________________________________  Signature of Witness         Date  Time         Relationship to Patient    STATEMENT OF PHYSICIAN My signature below affirms that prior to the time of the procedure; I have explained to the patient and/or his/her legal representative, the risks and benefits involved in the proposed treatment and any reasonable alternative to the proposed treatment. I have also explained the risks and benefits involved in refusal of the proposed treatment and alternatives  to the proposed treatment and have answered the patient's questions. If I have a significant financial interest in a co-management agreement or a significant financial interest in any product or implant, or other significant relationship used in this procedure/surgery, I have disclosed this and had a discussion with my patient.     _______________________________________________________________ _____________________________  (Signature of Physician)                                                                                         (Date)                                   (Time)  Patient Name: Arnulfo Clark    : 5/10/1965   Printed: 2024      Medical Record #: [id number]                                              Page 1 of 1

## (undated) NOTE — LETTER
January 30, 2020    [address] [address]     Patient: Ryne Gilmore   YOB: 1965   Date of Visit: 1/30/2020       Dear Dr. Estephania Chavez MD:    Thank you for referring Gemma Brambila to me for evaluation.  Here is m Current Outpatient Medications   Medication Sig Dispense Refill   • latanoprost 0.005 % Ophthalmic Solution Instill 1 drop by ophthalmic route every night into both eyes 3 Bottle 3       Allergies:    Calamari Oil [Squid*    ITCHING, SWELLING  Grass Right +1.25 +1.00 030       Left +2.25 Sphere              Manifest Refraction #2       Sphere Cylinder [place] Dist [place] Add [place]    Right +1.25 Sphere  20/20 +2.50 20/20    Left +1.75 Sphere  20/20 +2.50 20/20          Final Rx       Sphere Cylinder Dist • latanoprost 0.005 % Ophthalmic Solution 3 Bottle 3     Sig: Instill 1 drop by ophthalmic route every night into both eyes        Follow up instructions:  Return for Next avail. VF and OCT with no MD and then 1 mo for a pressure check .     1/30/2020  Scri
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
## (undated) NOTE — ED AVS SNAPSHOT
[facility] in [facility] [address].  [address] [facility]    Phone:  [phone]    Fax:  [phone]   MRN: [id number]    Department:  [facility] in [address] [place] [address]   Date of Visit: It is our goal to assure that you are completely satisfied with every aspect of your visit today.   In an effort to constantly improve our service to you, we would appreciate any positive or negative feedback related to the care you received in our [employer] [facility] account. You may have had testing done that requires us to contact you. Please make sure we have your correct phone number on file.      OUR CURRENT HOURS OF OPERATION:  MONDAY THROUGH FRIDAY 8AM - 8PM  WEEKENDS AND HOLIDAYS 8AM - 6PM    I certifi

## (undated) NOTE — LETTER
[address], [place]  Authorization for Invasive Procedures  Date: 9-1-2023           Time: ____    I hereby authorize Valerio Hardy MD, my physician and his/her assistants (if applicable), which may include medical students, residents, and/or fellows, to perform the following surgical operation/ procedure and administer such anesthesia as may be determined necessary by my physician:  Operation/Procedure name (s)  Cardiac Catheterization, Left Ventricular Cineangiography, Bilateral Selective Coronary Angiography and/or Right Heart Catheterization; possible Percutaneous Transluminal Coronary Angioplasty, Coronary Atherectomy, Coronary Stent, Intracoronary Thrombolytic therapy, Antiplatelet therapy and/or Intravascular Ultrasound on Arnulfo AYDIN Dejanprudence   2. I recognize that during the surgical operation/procedure, unforeseen conditions may necessitate additional or different procedures than those listed above. I, therefore, further authorize and request that the above-named surgeon, assistants, or designees perform such procedures as are, in their judgment, necessary and desirable. 3.   My surgeon/physician has discussed prior to my surgery the potential benefits, risks and side effects of this procedure; the likelihood of achieving goals; and potential problems that might occur during recuperation. They also discussed reasonable alternatives to the procedure, including risks, benefits, and side effects related to the alternatives and risks related to not receiving this procedure. I have had all my questions answered and I acknowledge that no guarantee has been made as to the result that may be obtained. 4.   Should the need arise during my operation/procedure, which includes change of level of care prior to discharge, I also consent to the administration of blood and/or blood products.   Further, I understand that despite careful testing and screening of blood or blood products by collecting agencies, I may still be subject to ill effects as a result of receiving a blood transfusion and/or blood products. The following are some, but not all, of the potential risks that can occur: fever and allergic reactions, hemolytic reactions, transmission of diseases such as Hepatitis, AIDS and Cytomegalovirus (CMV) and fluid overload. In the event that I wish to have an autologous transfusion of my own blood, or a directed donor transfusion, I will discuss this with my physician. Check only if Refusing Blood or Blood Products  I understand refusal of blood or blood products as deemed necessary by my physician may have serious consequences to my condition to include possible death. I hereby assume responsibility for my refusal and release the hospital, its personnel, and my physicians from any responsibility for the consequences of my refusal.          o  Refuse      5. I authorize the use of any specimen, organs, tissues, body parts or foreign objects that may be removed from my body during the operation/procedure for diagnosis, research or teaching purposes and their subsequent disposal by hospital authorities. I also authorize the release of specimen test results and/or written reports to my treating physician on the hospital medical staff or other referring or consulting physicians involved in my care, at the discretion of the Pathologist or my treating physician. 6.   I consent to the photographing or videotaping of the operations or procedures to be performed, including appropriate portions of my body for medical, scientific, or educational purposes, provided my identity is not revealed by the pictures or by descriptive texts accompanying them. If the procedure has been photographed/videotaped, the surgeon will obtain the original picture, image, videotape or CD.   The hospital will not be responsible for storage, release or maintenance of the picture, image, tape or CD.    7.   I consent to the presence of a  or observers in the operating room as deemed necessary by my physician or their designees. 8.   I recognize that in the event my procedure results in extended X-Ray/fluoroscopy time, I may develop a skin reaction. 9. If I have a Do Not Attempt Resuscitation (DNAR) order in place, that status will be suspended while in the operating room, procedural suite, and during the recovery period unless otherwise explicitly stated by me (or a person authorized to consent on my behalf). The surgeon or my attending physician will determine when the applicable recovery period ends for purposes of reinstating the DNAR order. 10. Patients having a sterilization procedure: I understand that if the procedure is successful the results will be permanent and it will therefore be impossible for me to inseminate, conceive, or bear children. I also understand that the procedure is intended to result in sterility, although the result has not been guaranteed. 11. I acknowledge that my physician has explained sedation/analgesia administration to me including the risk and benefits I consent to the administration of sedation/analgesia as may be necessary or desirable in the judgment of my physician.     I CERTIFY THAT I HAVE READ AND FULLY UNDERSTAND THE ABOVE CONSENT TO OPERATION and/or OTHER PROCEDURE.        ____________________________________       _________________________________      ______________________________  Signature of Patient         Signature of Responsible Person        Printed Name of Responsible Person    ____________________________________      _________________________________      ______________________________       Signature of Witness          Relationship to Patient                       Date                                       Time  Patient Name: Abraham Munroe     : 5/10/1965                 Printed: 2023      Medical Record #: [id number] Page 1 of 2           STATEMENT OF PHYSICIAN My signature below affirms that prior to the time of the procedure; I have explained to the patient and/or his/her legal representative, the risks and benefits involved in the proposed treatment and any reasonable alternative to the proposed treatment. I have also explained the risks and benefits involved in refusal of the proposed treatment and alternatives to the proposed treatment and have answered the patient's questions.  If I have a significant financial interest in a co-management agreement or a significant financial interest in any product or implant, or other significant relationship used in this procedure/surgery, I have disclosed this and had a discussion with my patient.     _______________________________________________________________ _____________________________  Joceline Jordan of Physician)                                                                                         (Date)                                   (Time)  Patient Name: Jose Byers     : 5/10/1965                 Printed: 2023      Medical Record #: [id number]                      Page 2 of 2

## (undated) NOTE — LETTER
Dr. Jiménez    Surgical Clearance Needed    Date: 2/12/2024                                                                       From: Madina Phipps: Dr. Holliday                                                                                Fax: [phone]    RE: Surgical Clearance               # of Pages: 1        Patient Name: Arnulfo Clark    Patient YOB: 1965    Consult For: Cardiac Clearance    Surgery: ANAL EXAM UNDER ANESTHESIA, POSSIBLE FISTULOTOMY, POSSIBLE SETON PLACEMENT, POSSIBLE DRAINAGE OF PERIANAL ABSCESS    Date of Surgery: 2/23/2024 at [facility]        This facsimile transmission is provided for your internal use only. If the reader of this message is not the intended recipient, you are hereby notified that you have received this document in error, and that any review, dissemination, distribution, or copying of this message is strictly prohibited. If you have received this communication in error, please notify us immediately by telephone and return the original message to us by mail.